# Patient Record
Sex: FEMALE | Race: BLACK OR AFRICAN AMERICAN | Employment: FULL TIME | URBAN - METROPOLITAN AREA
[De-identification: names, ages, dates, MRNs, and addresses within clinical notes are randomized per-mention and may not be internally consistent; named-entity substitution may affect disease eponyms.]

---

## 2017-01-16 ENCOUNTER — OFFICE VISIT (OUTPATIENT)
Dept: GASTROENTEROLOGY | Facility: CLINIC | Age: 37
End: 2017-01-16

## 2017-01-16 ENCOUNTER — TELEPHONE (OUTPATIENT)
Dept: GASTROENTEROLOGY | Facility: CLINIC | Age: 37
End: 2017-01-16

## 2017-01-16 VITALS
WEIGHT: 214.38 LBS | DIASTOLIC BLOOD PRESSURE: 78 MMHG | SYSTOLIC BLOOD PRESSURE: 116 MMHG | HEIGHT: 64.5 IN | HEART RATE: 93 BPM | BODY MASS INDEX: 36.15 KG/M2

## 2017-01-16 DIAGNOSIS — K59.01 SLOW TRANSIT CONSTIPATION: ICD-10-CM

## 2017-01-16 DIAGNOSIS — K92.1 BLOOD IN STOOL: Primary | ICD-10-CM

## 2017-01-16 PROCEDURE — 99242 OFF/OP CONSLTJ NEW/EST SF 20: CPT | Performed by: INTERNAL MEDICINE

## 2017-01-16 PROCEDURE — 99212 OFFICE O/P EST SF 10 MIN: CPT | Performed by: INTERNAL MEDICINE

## 2017-01-16 RX ORDER — NORGESTIMATE AND ETHINYL ESTRADIOL 7DAYSX3 28
KIT ORAL
COMMUNITY
End: 2017-09-06

## 2017-01-16 NOTE — PATIENT INSTRUCTIONS
1. Daily laxative regimen to make you more regular. Goal is 1-2 easy bowel movements per day. Options:    A. Stool softeners - Colace/docusate - 2-6 pills per day (all at once or 1-2 twice per day)    B.   Daily dose of fiber - helps some people and roel

## 2017-01-16 NOTE — TELEPHONE ENCOUNTER
Scheduled for:  Colonoscopy 32782  Date:  3/14/17  Location:  [facility]  Sedation:  Mac sedation  Time:  0730 Am  Prep: Miralax prep  Meds/Allergies Reconciled?:  Yes  Diagnosis with codes:   Rectal Bleeding K62.5  [facility] or [facility] notified?: [address]

## 2017-01-16 NOTE — PROGRESS NOTES
HPI:    Patient ID: Arcelia Love is a 39year old woman with history of 2 spontaneous childbirths, 2 children who is referred by Dr. Kaleb Gates for evaluation of rectal bleeding. Ms. Jenise Leavitt describes 4 episodes of small volume rectal bleeding.   This 5.6% February 20, 2016    Suggest:    1. I recommended colonoscopy examination with possible biopsy, possible polypectomy.  We discussed sedation options of conscious sedation versus MAC anesthesia, and agreed on MAC anesthesia due to young age, aversion t

## 2017-03-14 ENCOUNTER — ANESTHESIA (OUTPATIENT)
Dept: ENDOSCOPY | Facility: HOSPITAL | Age: 37
End: 2017-03-14
Payer: COMMERCIAL

## 2017-03-14 ENCOUNTER — HOSPITAL ENCOUNTER (OUTPATIENT)
Facility: HOSPITAL | Age: 37
Setting detail: HOSPITAL OUTPATIENT SURGERY
Discharge: HOME | End: 2017-03-14
Attending: INTERNAL MEDICINE | Admitting: INTERNAL MEDICINE
Payer: COMMERCIAL

## 2017-03-14 ENCOUNTER — SURGERY (OUTPATIENT)
Age: 37
End: 2017-03-14

## 2017-03-14 ENCOUNTER — ANESTHESIA EVENT (OUTPATIENT)
Dept: ENDOSCOPY | Facility: HOSPITAL | Age: 37
End: 2017-03-14
Payer: COMMERCIAL

## 2017-03-14 DIAGNOSIS — K62.5 RECTAL BLEEDING: Primary | ICD-10-CM

## 2017-03-14 DIAGNOSIS — K64.9 HEMORRHOIDS: ICD-10-CM

## 2017-03-14 DIAGNOSIS — K57.90 DIVERTICULOSIS: ICD-10-CM

## 2017-03-14 LAB — B-HCG UR QL: NEGATIVE

## 2017-03-14 PROCEDURE — 0DJD8ZZ INSPECTION OF LOWER INTESTINAL TRACT, VIA NATURAL OR ARTIFICIAL OPENING ENDOSCOPIC: ICD-10-PCS | Performed by: INTERNAL MEDICINE

## 2017-03-14 PROCEDURE — 45378 DIAGNOSTIC COLONOSCOPY: CPT | Performed by: INTERNAL MEDICINE

## 2017-03-14 RX ORDER — HALOPERIDOL 5 MG/ML
0.25 INJECTION INTRAMUSCULAR ONCE AS NEEDED
Status: DISCONTINUED | OUTPATIENT
Start: 2017-03-14 | End: 2017-03-14

## 2017-03-14 RX ORDER — HYDROMORPHONE HYDROCHLORIDE 1 MG/ML
0.2 INJECTION, SOLUTION INTRAMUSCULAR; INTRAVENOUS; SUBCUTANEOUS EVERY 5 MIN PRN
Status: DISCONTINUED | OUTPATIENT
Start: 2017-03-14 | End: 2017-03-14

## 2017-03-14 RX ORDER — NALOXONE HYDROCHLORIDE 0.4 MG/ML
80 INJECTION, SOLUTION INTRAMUSCULAR; INTRAVENOUS; SUBCUTANEOUS AS NEEDED
Status: DISCONTINUED | OUTPATIENT
Start: 2017-03-14 | End: 2017-03-14

## 2017-03-14 RX ORDER — SODIUM CHLORIDE, SODIUM LACTATE, POTASSIUM CHLORIDE, CALCIUM CHLORIDE 600; 310; 30; 20 MG/100ML; MG/100ML; MG/100ML; MG/100ML
INJECTION, SOLUTION INTRAVENOUS CONTINUOUS
Status: DISCONTINUED | OUTPATIENT
Start: 2017-03-14 | End: 2017-03-14

## 2017-03-14 RX ORDER — MORPHINE SULFATE 2 MG/ML
2 INJECTION, SOLUTION INTRAMUSCULAR; INTRAVENOUS EVERY 10 MIN PRN
Status: DISCONTINUED | OUTPATIENT
Start: 2017-03-14 | End: 2017-03-14

## 2017-03-14 RX ORDER — MORPHINE SULFATE 4 MG/ML
4 INJECTION, SOLUTION INTRAMUSCULAR; INTRAVENOUS EVERY 10 MIN PRN
Status: DISCONTINUED | OUTPATIENT
Start: 2017-03-14 | End: 2017-03-14

## 2017-03-14 RX ORDER — MORPHINE SULFATE 10 MG/ML
6 INJECTION, SOLUTION INTRAMUSCULAR; INTRAVENOUS EVERY 10 MIN PRN
Status: DISCONTINUED | OUTPATIENT
Start: 2017-03-14 | End: 2017-03-14

## 2017-03-14 RX ORDER — HYDROCODONE BITARTRATE AND ACETAMINOPHEN 5; 325 MG/1; MG/1
1 TABLET ORAL AS NEEDED
Status: DISCONTINUED | OUTPATIENT
Start: 2017-03-14 | End: 2017-03-14

## 2017-03-14 RX ORDER — MIDAZOLAM HYDROCHLORIDE 1 MG/ML
INJECTION INTRAMUSCULAR; INTRAVENOUS AS NEEDED
Status: DISCONTINUED | OUTPATIENT
Start: 2017-03-14 | End: 2017-03-14 | Stop reason: SURG

## 2017-03-14 RX ORDER — LIDOCAINE HYDROCHLORIDE 10 MG/ML
INJECTION, SOLUTION EPIDURAL; INFILTRATION; INTRACAUDAL; PERINEURAL AS NEEDED
Status: DISCONTINUED | OUTPATIENT
Start: 2017-03-14 | End: 2017-03-14 | Stop reason: SURG

## 2017-03-14 RX ORDER — HYDROMORPHONE HYDROCHLORIDE 1 MG/ML
0.6 INJECTION, SOLUTION INTRAMUSCULAR; INTRAVENOUS; SUBCUTANEOUS EVERY 5 MIN PRN
Status: DISCONTINUED | OUTPATIENT
Start: 2017-03-14 | End: 2017-03-14

## 2017-03-14 RX ORDER — ONDANSETRON 2 MG/ML
4 INJECTION INTRAMUSCULAR; INTRAVENOUS ONCE AS NEEDED
Status: DISCONTINUED | OUTPATIENT
Start: 2017-03-14 | End: 2017-03-14

## 2017-03-14 RX ORDER — HYDROCODONE BITARTRATE AND ACETAMINOPHEN 5; 325 MG/1; MG/1
2 TABLET ORAL AS NEEDED
Status: DISCONTINUED | OUTPATIENT
Start: 2017-03-14 | End: 2017-03-14

## 2017-03-14 RX ORDER — HYDROMORPHONE HYDROCHLORIDE 1 MG/ML
0.4 INJECTION, SOLUTION INTRAMUSCULAR; INTRAVENOUS; SUBCUTANEOUS EVERY 5 MIN PRN
Status: DISCONTINUED | OUTPATIENT
Start: 2017-03-14 | End: 2017-03-14

## 2017-03-14 RX ORDER — SODIUM CHLORIDE, SODIUM LACTATE, POTASSIUM CHLORIDE, CALCIUM CHLORIDE 600; 310; 30; 20 MG/100ML; MG/100ML; MG/100ML; MG/100ML
INJECTION, SOLUTION INTRAVENOUS CONTINUOUS PRN
Status: DISCONTINUED | OUTPATIENT
Start: 2017-03-14 | End: 2017-03-14 | Stop reason: SURG

## 2017-03-14 RX ADMIN — SODIUM CHLORIDE, SODIUM LACTATE, POTASSIUM CHLORIDE, CALCIUM CHLORIDE: 600; 310; 30; 20 INJECTION, SOLUTION INTRAVENOUS at 07:20:00

## 2017-03-14 RX ADMIN — SODIUM CHLORIDE, SODIUM LACTATE, POTASSIUM CHLORIDE, CALCIUM CHLORIDE: 600; 310; 30; 20 INJECTION, SOLUTION INTRAVENOUS at 08:13:00

## 2017-03-14 RX ADMIN — MIDAZOLAM HYDROCHLORIDE 2 MG: 1 INJECTION INTRAMUSCULAR; INTRAVENOUS at 07:48:00

## 2017-03-14 RX ADMIN — LIDOCAINE HYDROCHLORIDE 50 MG: 10 INJECTION, SOLUTION EPIDURAL; INFILTRATION; INTRACAUDAL; PERINEURAL at 07:48:00

## 2017-03-14 NOTE — ANESTHESIA POSTPROCEDURE EVALUATION
Patient: Abhay Ferrari    Procedure Summary     Date Anesthesia Start Anesthesia Stop Room / Location    03/14/17 0737 0820 [facility] ENDOSCOPY 05 / [facility] ENDOSCOPY       Procedure Diagnosis Surgeon Responsible Provider    COLONOSCOPY (N/A ) Rectal bleeding

## 2017-03-14 NOTE — OPERATIVE REPORT
COLONOSCOPY PROCEDURE REPORT     DATE OF PROCEDURE:  3/14/2017     PCP: Jeremiah Anton MD     PREOPERATIVE DIAGNOSIS:  Rectal bleeding     POSTOPERATIVE DIAGNOSIS:  See impression. SURGEON:  FLASH Guzman Friday anesthesia

## 2017-03-14 NOTE — ANESTHESIA PREPROCEDURE EVALUATION
Anesthesia PreOp Note    HPI:     Jaye Shields is a 39year old female who presents for preoperative consultation requested by: Dave Rdz MD    Date of Surgery: 3/14/2017    Procedure(s):  COLONOSCOPY  Indication: Rectal bleeding Narrative       Available pre-op labs reviewed. Lab Results  Component Value Date   URINEPREG Negative 03/14/2017             Vital Signs: Body mass index is 35.86 kg/(m^2). height is 1.626 m (5' 4\") and weight is 94.802 kg (209 lb).     03/13/17  11

## 2017-03-14 NOTE — H&P
History & Physical Examination    Patient Name: Yossi Castellanos  MRN: [id number]  CSN: [id number]  YOB: 1980    Diagnosis: Rectal bleeding    Present Illness:     Signed Mon Jan 16, 2017 9:35 AM CST 1/16/2017     HPI:    Patient ID: Chinmay Edwards HYDROmorphone HCl PF (DILAUDID) 1 MG/ML injection 0.6 mg 0.6 mg Intravenous Q5 Min PRN   morphINE sulfate (PF) 2 MG/ML injection 2 mg 2 mg Intravenous Q10 Min PRN   morphINE sulfate (PF) 4 MG/ML injection 4 mg 4 mg Intravenous Q10 Min PRN   morphINE sulf Aravind.Boros ]    Elli Jacobs [ ] [ ]    HEART [ X ] [ X ]    LUNGS [ X ] [ X ]    Gerhard Medina [ X ] [ X ]    Janis Xavier [ ] [ ]    EXTREMITIES [ ] [ ]    OTHER        See Anesthesia documentation  [ x ] I have discussed the risks and benefits and alternatives with the

## 2017-03-15 VITALS
WEIGHT: 209 LBS | HEIGHT: 64 IN | BODY MASS INDEX: 35.68 KG/M2 | OXYGEN SATURATION: 100 % | HEART RATE: 93 BPM | RESPIRATION RATE: 13 BRPM | SYSTOLIC BLOOD PRESSURE: 125 MMHG | DIASTOLIC BLOOD PRESSURE: 79 MMHG

## 2017-04-03 ENCOUNTER — OFFICE VISIT (OUTPATIENT)
Dept: INTERNAL MEDICINE CLINIC | Facility: CLINIC | Age: 37
End: 2017-04-03

## 2017-04-03 ENCOUNTER — TELEPHONE (OUTPATIENT)
Dept: OBGYN CLINIC | Facility: CLINIC | Age: 37
End: 2017-04-03

## 2017-04-03 VITALS
TEMPERATURE: 98 F | RESPIRATION RATE: 18 BRPM | SYSTOLIC BLOOD PRESSURE: 120 MMHG | HEART RATE: 96 BPM | BODY MASS INDEX: 35.48 KG/M2 | DIASTOLIC BLOOD PRESSURE: 76 MMHG | WEIGHT: 210.38 LBS | HEIGHT: 64.5 IN

## 2017-04-03 DIAGNOSIS — E66.9 NON MORBID OBESITY, UNSPECIFIED OBESITY TYPE: ICD-10-CM

## 2017-04-03 DIAGNOSIS — F43.9 STRESS AT HOME: ICD-10-CM

## 2017-04-03 DIAGNOSIS — Z00.00 ROUTINE PHYSICAL EXAMINATION: Primary | ICD-10-CM

## 2017-04-03 PROCEDURE — 99395 PREV VISIT EST AGE 18-39: CPT | Performed by: INTERNAL MEDICINE

## 2017-04-03 NOTE — TELEPHONE ENCOUNTER
One option is to finish new pack so that she will have period after 6 weeks of active pills instead of 3 weeks. She may or may not have BTB this month.   Other option if she only took 1 active pill is to stop and have period like she is supposed to and [facility]

## 2017-04-03 NOTE — PROGRESS NOTES
HPI:    Patient ID: Maria R Horowitz is a 39year old female. HPI  Patient is here for general physical exam.  I last saw her in February of last year. Since that visit she saw the gynecology department for regular checkup.   Also saw different intern Standard drinks or equivalent per week       Comment: Occasionally         Review of Systems   Constitutional: Negative for fever, chills and fatigue. HENT: Negative for hearing loss. Eyes: Negative for visual disturbance.    Respiratory: Negative for mood and affect. Her behavior is normal. Judgment and thought content normal.       Body mass index is 35.57 kg/(m^2). ASSESSMENT/PLAN:   (Z00.00) Routine physical examination  (primary encounter diagnosis)  Plan: Physical exam is unremarkable.   Act

## 2017-04-03 NOTE — TELEPHONE ENCOUNTER
PT THOUGHT SHE WAS TO START A NEW PACK OF OC'S TODAY. TOOK FIRST WHITE PILL. THEN SHE REALIZED SHE WAS SUPPOSED TO BE TAKING PLACEBO PILLS THIS WEEK. TOLD PT SHE MAY NOT GET A BLEED THIS WEEK LIKE SHE NORMALLY WOULD OR BLEED COULD BE LATER THAN USUAL.

## 2017-07-07 NOTE — TELEPHONE ENCOUNTER
Pt has an appt with SELVIN for 9/6/17. Pt requesting rx for Trinessa 28, brand name. (Rx sent 11/8/16 for 3 packages and 2 refills.)  Pt states that she is taking [place] every month and gets a period every month.   Can rx be sent for Trinessa until appt wi

## 2017-07-10 RX ORDER — NORGESTIMATE-ETHINYL ESTRADIOL 7DAYSX3 28
1 TABLET ORAL DAILY
Qty: 1 PACKAGE | Refills: 2 | Status: SHIPPED | OUTPATIENT
Start: 2017-07-10 | End: 2017-08-07

## 2017-07-11 RX ORDER — NORGESTIMATE-ETHINYL ESTRADIOL 7DAYSX3 28
1 TABLET ORAL DAILY
Qty: 84 TABLET | Refills: 2 | OUTPATIENT
Start: 2017-07-11 | End: 2017-08-08

## 2017-09-06 ENCOUNTER — OFFICE VISIT (OUTPATIENT)
Dept: OBGYN CLINIC | Facility: CLINIC | Age: 37
End: 2017-09-06

## 2017-09-06 VITALS
HEART RATE: 96 BPM | WEIGHT: 218.81 LBS | HEIGHT: 64.25 IN | BODY MASS INDEX: 37.36 KG/M2 | SYSTOLIC BLOOD PRESSURE: 126 MMHG | DIASTOLIC BLOOD PRESSURE: 81 MMHG

## 2017-09-06 DIAGNOSIS — Z12.4 SCREENING FOR MALIGNANT NEOPLASM OF CERVIX: ICD-10-CM

## 2017-09-06 DIAGNOSIS — Z01.419 ENCOUNTER FOR GYNECOLOGICAL EXAMINATION: Primary | ICD-10-CM

## 2017-09-06 PROCEDURE — 99395 PREV VISIT EST AGE 18-39: CPT | Performed by: OBSTETRICS & GYNECOLOGY

## 2017-09-06 RX ORDER — NORGESTIMATE AND ETHINYL ESTRADIOL 7DAYSX3 28
1 KIT ORAL DAILY
Qty: 3 PACKAGE | Refills: 3 | Status: SHIPPED | OUTPATIENT
Start: 2017-09-06 | End: 2018-07-25

## 2017-09-07 LAB
HPV I/H RISK 1 DNA SPEC QL NAA+PROBE: NEGATIVE
LAST PAP RESULT: NORMAL

## 2017-09-07 NOTE — PROGRESS NOTES
Perla Coughlin is a 39year old female [id number] Patient's last menstrual period was 08/24/2017 (exact date). here for annual exam.       Last seen 7/22/15. Last pap 7/2014 normal with neg HPV. Doing well with [employer] and [place]. Declined STD screen.     Had Known Allergies      Review of Systems:  Constitutional:  Denies fatigue, night sweats, hot flashes  Eyes:  denies blurred or double vision  Cardiovascular:  denies chest pain or palpitations  Respiratory:  denies shortness of breath  Gastrointestinal:  de tenderness  Perineum/anus: normal      Assessment & Plan:    Geno Guan is a 39year old female who presents for an annual physical exam.    1. Encounter for gynecological examination  Pap and HPV. Will do Pap/HPV q 3 years.    Annual exams encour

## 2017-11-14 ENCOUNTER — OFFICE VISIT (OUTPATIENT)
Dept: INTERNAL MEDICINE CLINIC | Facility: CLINIC | Age: 37
End: 2017-11-14

## 2017-11-14 VITALS
HEART RATE: 90 BPM | TEMPERATURE: 98 F | BODY MASS INDEX: 37.07 KG/M2 | DIASTOLIC BLOOD PRESSURE: 75 MMHG | HEIGHT: 64.5 IN | SYSTOLIC BLOOD PRESSURE: 114 MMHG | WEIGHT: 219.81 LBS

## 2017-11-14 DIAGNOSIS — M72.2 PLANTAR FASCIITIS: Primary | ICD-10-CM

## 2017-11-14 DIAGNOSIS — L81.1 MELASMA: ICD-10-CM

## 2017-11-14 PROCEDURE — 99213 OFFICE O/P EST LOW 20 MIN: CPT | Performed by: INTERNAL MEDICINE

## 2017-11-14 PROCEDURE — 99212 OFFICE O/P EST SF 10 MIN: CPT | Performed by: INTERNAL MEDICINE

## 2017-11-14 RX ORDER — NAPROXEN 500 MG/1
500 TABLET ORAL 2 TIMES DAILY WITH MEALS
Qty: 30 TABLET | Refills: 0 | Status: SHIPPED | OUTPATIENT
Start: 2017-11-14 | End: 2017-12-03

## 2017-11-14 NOTE — PROGRESS NOTES
Monique Castillo is a 40year old female. Patient presents with: Foot Pain: left foot  Derm Problem: dark spots on neck    HPI:   About 2 months ago, shortly after she was doing a lot of walking, she developed pain on the plantar aspects of both heels. 4.5\" (1.638 m)   Wt 219 lb 12.8 oz (99.7 kg)   BMI 37.15 kg/m²   NECK: Patchy areas of brownish macular hyperpigmentation bilateral right greater than left neck  EXTREMITIES: Mild-moderate tenderness plantar aspect left calcaneus without palpable or visib

## 2017-11-14 NOTE — PATIENT INSTRUCTIONS
Please do stretching exercises regularly and apply ice to your left heel 2-3 times daily. Please take naproxen 500 mg twice daily with food. Call if no better.

## 2017-11-15 ENCOUNTER — TELEPHONE (OUTPATIENT)
Dept: INTERNAL MEDICINE CLINIC | Facility: CLINIC | Age: 37
End: 2017-11-15

## 2017-11-15 NOTE — TELEPHONE ENCOUNTER
Patient is asking for a note for work stating that she needs to wear more comfortable shoes  ( meaning not a dress shoe )  More casual shoe, even maybe a gym shoe. Please send not through My Chart.

## 2017-12-03 RX ORDER — NAPROXEN 500 MG/1
500 TABLET ORAL 2 TIMES DAILY WITH MEALS
Qty: 30 TABLET | Refills: 0 | Status: SHIPPED | OUTPATIENT
Start: 2017-12-03 | End: 2018-09-11

## 2018-07-25 RX ORDER — NORGESTIMATE AND ETHINYL ESTRADIOL 7DAYSX3 28
1 KIT ORAL DAILY
Qty: 1 PACKAGE | Refills: 0 | Status: SHIPPED
Start: 2018-07-25 | End: 2018-09-11

## 2018-07-25 NOTE — TELEPHONE ENCOUNTER
From: Javy Shelton  Sent: 7/25/2018 9:38 AM CDT  Subject: Medication Renewal Request    Alejo Henderson would like a refill of the following medications:     Norgestim-Eth Estrad Triphasic (TRINESSA, 28,) 0.18/0.215/0.25 MG-35 MCG Oral Tab Chas Lazaro

## 2018-07-25 NOTE — TELEPHONE ENCOUNTER
Informed pt that she should have refills available SELVIN sent Rx 9/2017 for 1 full year. Pt states she was not sure if she did. Informed pt that she is due for her annual exam in 9/2018. Assisted pt with scheduling annual exam with SELVIN on 9/11/18.  Informed p

## 2018-09-11 ENCOUNTER — OFFICE VISIT (OUTPATIENT)
Dept: INTERNAL MEDICINE CLINIC | Facility: CLINIC | Age: 38
End: 2018-09-11
Payer: COMMERCIAL

## 2018-09-11 ENCOUNTER — LAB ENCOUNTER (OUTPATIENT)
Dept: LAB | Facility: HOSPITAL | Age: 38
End: 2018-09-11
Attending: PHYSICIAN ASSISTANT
Payer: COMMERCIAL

## 2018-09-11 VITALS
HEIGHT: 64 IN | DIASTOLIC BLOOD PRESSURE: 84 MMHG | SYSTOLIC BLOOD PRESSURE: 124 MMHG | WEIGHT: 216.31 LBS | BODY MASS INDEX: 36.93 KG/M2 | HEART RATE: 89 BPM

## 2018-09-11 DIAGNOSIS — Z00.00 ROUTINE PHYSICAL EXAMINATION: ICD-10-CM

## 2018-09-11 DIAGNOSIS — Z80.3 FAMILY HISTORY OF BREAST CANCER IN MOTHER: ICD-10-CM

## 2018-09-11 DIAGNOSIS — M72.2 PLANTAR FASCIITIS, LEFT: ICD-10-CM

## 2018-09-11 DIAGNOSIS — Z91.89 AT HIGH RISK FOR BREAST CANCER: ICD-10-CM

## 2018-09-11 DIAGNOSIS — Z00.00 ROUTINE PHYSICAL EXAMINATION: Primary | ICD-10-CM

## 2018-09-11 DIAGNOSIS — Z01.419 ENCOUNTER FOR GYNECOLOGICAL EXAMINATION WITHOUT ABNORMAL FINDING: ICD-10-CM

## 2018-09-11 LAB
ALBUMIN SERPL BCP-MCNC: 3.5 G/DL (ref 3.5–4.8)
ALBUMIN/GLOB SERPL: 0.9 {RATIO} (ref 1–2)
ALP SERPL-CCNC: 85 U/L (ref 32–100)
ALT SERPL-CCNC: 10 U/L (ref 14–54)
ANION GAP SERPL CALC-SCNC: 7 MMOL/L (ref 0–18)
AST SERPL-CCNC: 18 U/L (ref 15–41)
BASOPHILS # BLD: 0 K/UL (ref 0–0.2)
BASOPHILS NFR BLD: 1 %
BILIRUB SERPL-MCNC: 0.2 MG/DL (ref 0.3–1.2)
BUN SERPL-MCNC: 12 MG/DL (ref 8–20)
BUN/CREAT SERPL: 14.1 (ref 10–20)
CALCIUM SERPL-MCNC: 8.9 MG/DL (ref 8.5–10.5)
CHLORIDE SERPL-SCNC: 103 MMOL/L (ref 95–110)
CHOLEST SERPL-MCNC: 163 MG/DL (ref 110–200)
CO2 SERPL-SCNC: 26 MMOL/L (ref 22–32)
CREAT SERPL-MCNC: 0.85 MG/DL (ref 0.5–1.5)
EOSINOPHIL # BLD: 0 K/UL (ref 0–0.7)
EOSINOPHIL NFR BLD: 1 %
ERYTHROCYTE [DISTWIDTH] IN BLOOD BY AUTOMATED COUNT: 15.2 % (ref 11–15)
GLOBULIN PLAS-MCNC: 3.9 G/DL (ref 2.5–3.7)
GLUCOSE SERPL-MCNC: 90 MG/DL (ref 70–99)
HBA1C MFR BLD: 5.6 % (ref 4–6)
HCT VFR BLD AUTO: 36.2 % (ref 35–48)
HDLC SERPL-MCNC: 54 MG/DL
HGB BLD-MCNC: 11.8 G/DL (ref 12–16)
LDLC SERPL CALC-MCNC: 83 MG/DL (ref 0–99)
LYMPHOCYTES # BLD: 2.2 K/UL (ref 1–4)
LYMPHOCYTES NFR BLD: 34 %
MCH RBC QN AUTO: 27.3 PG (ref 27–32)
MCHC RBC AUTO-ENTMCNC: 32.5 G/DL (ref 32–37)
MCV RBC AUTO: 84.2 FL (ref 80–100)
MONOCYTES # BLD: 0.6 K/UL (ref 0–1)
MONOCYTES NFR BLD: 9 %
NEUTROPHILS # BLD AUTO: 3.6 K/UL (ref 1.8–7.7)
NEUTROPHILS NFR BLD: 56 %
NONHDLC SERPL-MCNC: 109 MG/DL
OSMOLALITY UR CALC.SUM OF ELEC: 281 MOSM/KG (ref 275–295)
PATIENT FASTING: YES
PLATELET # BLD AUTO: 389 K/UL (ref 140–400)
PMV BLD AUTO: 10.3 FL (ref 7.4–10.3)
POTASSIUM SERPL-SCNC: 3.7 MMOL/L (ref 3.3–5.1)
PROT SERPL-MCNC: 7.4 G/DL (ref 5.9–8.4)
RBC # BLD AUTO: 4.3 M/UL (ref 3.7–5.4)
SODIUM SERPL-SCNC: 136 MMOL/L (ref 136–144)
TRIGL SERPL-MCNC: 128 MG/DL (ref 1–149)
TSH SERPL-ACNC: 1.57 UIU/ML (ref 0.45–5.33)
WBC # BLD AUTO: 6.5 K/UL (ref 4–11)

## 2018-09-11 PROCEDURE — 36415 COLL VENOUS BLD VENIPUNCTURE: CPT

## 2018-09-11 PROCEDURE — 83036 HEMOGLOBIN GLYCOSYLATED A1C: CPT

## 2018-09-11 PROCEDURE — 85025 COMPLETE CBC W/AUTO DIFF WBC: CPT

## 2018-09-11 PROCEDURE — 84443 ASSAY THYROID STIM HORMONE: CPT

## 2018-09-11 PROCEDURE — 80053 COMPREHEN METABOLIC PANEL: CPT

## 2018-09-11 PROCEDURE — 80061 LIPID PANEL: CPT

## 2018-09-11 PROCEDURE — 99395 PREV VISIT EST AGE 18-39: CPT | Performed by: PHYSICIAN ASSISTANT

## 2018-09-11 RX ORDER — NORGESTIMATE AND ETHINYL ESTRADIOL 7DAYSX3 28
1 KIT ORAL DAILY
Qty: 1 PACKAGE | Refills: 11 | Status: SHIPPED | OUTPATIENT
Start: 2018-09-11 | End: 2019-08-09

## 2018-09-11 NOTE — PROGRESS NOTES
HPI:    Patient ID: Aide Dallas is a 40year old female. HPI   Patient presents today requesting physical exam.  She was last seen in the office by PCP on 4/3/17. At that time was referred to [facility] for ongoing stress at home.   Was seen by joint pain and gait problem. Skin: Negative for rash. Neurological: Negative for weakness, light-headedness and headaches. Hematological: Negative for adenopathy. Psychiatric/Behavioral: Negative for sleep disturbance and depressed mood.  The patien tenderness (left heel). Lymphadenopathy:     She has no cervical adenopathy. Neurological: She is alert and oriented to person, place, and time. No cranial nerve deficit. Skin: Skin is warm and dry. Psychiatric: She has a normal mood and affect.  He Visit:  Requested Prescriptions     Signed Prescriptions Disp Refills   • Norgestim-Eth Estrad Triphasic (TRINESSA, 28,) 0.18/0.215/0.25 MG-35 MCG Oral Tab 1 Package 11     Sig: Take 1 tablet by mouth daily.        Imaging & Referrals:  PODIATRY - INTERNAL

## 2018-10-20 ENCOUNTER — HOSPITAL ENCOUNTER (OUTPATIENT)
Dept: MAMMOGRAPHY | Facility: HOSPITAL | Age: 38
Discharge: HOME | End: 2018-10-20
Attending: PHYSICIAN ASSISTANT
Payer: COMMERCIAL

## 2018-10-20 DIAGNOSIS — Z80.3 FAMILY HISTORY OF BREAST CANCER IN MOTHER: ICD-10-CM

## 2018-10-20 DIAGNOSIS — Z91.89 AT HIGH RISK FOR BREAST CANCER: ICD-10-CM

## 2018-10-20 PROCEDURE — 77067 SCR MAMMO BI INCL CAD: CPT | Performed by: PHYSICIAN ASSISTANT

## 2018-10-20 PROCEDURE — 77063 BREAST TOMOSYNTHESIS BI: CPT | Performed by: PHYSICIAN ASSISTANT

## 2018-10-31 ENCOUNTER — APPOINTMENT (OUTPATIENT)
Dept: GENETICS | Facility: HOSPITAL | Age: 38
End: 2018-10-31
Attending: GENETIC COUNSELOR, MS
Payer: COMMERCIAL

## 2018-11-14 ENCOUNTER — APPOINTMENT (OUTPATIENT)
Dept: GENETICS | Facility: HOSPITAL | Age: 38
End: 2018-11-14
Attending: GENETIC COUNSELOR, MS
Payer: COMMERCIAL

## 2018-12-06 ENCOUNTER — TELEPHONE (OUTPATIENT)
Dept: HEMATOLOGY/ONCOLOGY | Facility: HOSPITAL | Age: 38
End: 2018-12-06

## 2018-12-06 NOTE — TELEPHONE ENCOUNTER
Made final attempt to reschedule patient, if she calls back restart the intake process, Hernandez Hollins folder discarded

## 2019-08-06 ENCOUNTER — TELEPHONE (OUTPATIENT)
Dept: INTERNAL MEDICINE CLINIC | Facility: CLINIC | Age: 39
End: 2019-08-06

## 2019-08-06 NOTE — TELEPHONE ENCOUNTER
PATIENT DROPPED OFF HEALTH SCREENING FORM . PLEASE CALL HER WHEN FORM IS COMPLETED AND SHE WILL COME TO .

## 2019-08-09 RX ORDER — NORGESTIMATE AND ETHINYL ESTRADIOL 7DAYSX3 28
KIT ORAL
Qty: 84 TABLET | Refills: 1 | Status: SHIPPED | OUTPATIENT
Start: 2019-08-09 | End: 2019-12-20

## 2019-08-09 NOTE — TELEPHONE ENCOUNTER
Please review; protocol failed. Last rx by Jenn Vargas    Recent Visits  Date Type Provider Dept   09/11/18 Office Visit Issac Huang PA-C [facility]-Internal Med   Showing recent visits within past 540 days with a meds authorizing provider and meeting all other requirements     Future Appointments  No visits were found meeting these conditions.    Showing future appointments within next 150 days with a meds authorizing provider and meeting all other requirements

## 2019-08-09 NOTE — TELEPHONE ENCOUNTER
LEFT MESSAGE FOR PATIENT ON HOME VOICEMAIL INFORMING FORM AVAILABLE FOR  AT [facility] OF THE [facility].

## 2019-10-12 ENCOUNTER — OFFICE VISIT (OUTPATIENT)
Dept: INTERNAL MEDICINE CLINIC | Facility: CLINIC | Age: 39
End: 2019-10-12
Payer: COMMERCIAL

## 2019-10-12 VITALS
BODY MASS INDEX: 39.37 KG/M2 | SYSTOLIC BLOOD PRESSURE: 138 MMHG | HEART RATE: 102 BPM | TEMPERATURE: 98 F | WEIGHT: 230.63 LBS | RESPIRATION RATE: 18 BRPM | HEIGHT: 64 IN | DIASTOLIC BLOOD PRESSURE: 82 MMHG

## 2019-10-12 DIAGNOSIS — Z00.00 ROUTINE PHYSICAL EXAMINATION: Primary | ICD-10-CM

## 2019-10-12 DIAGNOSIS — M25.561 RIGHT KNEE PAIN, UNSPECIFIED CHRONICITY: ICD-10-CM

## 2019-10-12 PROCEDURE — 99395 PREV VISIT EST AGE 18-39: CPT | Performed by: INTERNAL MEDICINE

## 2019-10-12 NOTE — PROGRESS NOTES
HPI:    Patient ID: Perla Coughlin is a 44year old female. HPI  Patient is here requesting a physical exam.  Seen here a year ago by the [de-identified] assistant. She is to be seeing the gynecologist soon.   The complaint today is that of right knee p Review of Systems   Constitutional: Negative for chills, fatigue and fever. HENT: Negative for hearing loss. Eyes: Negative for visual disturbance. Respiratory: Negative for cough and shortness of breath.     Cardiovascular: Negative for chest swelling, warmth, crepitus, or decreased range of motion. Lymphadenopathy:     She has no cervical adenopathy. Neurological: She is alert. No cranial nerve deficit. Coordination normal.   Skin: Skin is warm and dry. No rash noted.    Psychiatric: She ha

## 2019-10-29 ENCOUNTER — NURSE TRIAGE (OUTPATIENT)
Dept: OTHER | Age: 39
End: 2019-10-29

## 2019-10-29 ENCOUNTER — PATIENT MESSAGE (OUTPATIENT)
Dept: INTERNAL MEDICINE CLINIC | Facility: CLINIC | Age: 39
End: 2019-10-29

## 2019-10-29 NOTE — TELEPHONE ENCOUNTER
----- Message from Jerome Henderson sent at 10/29/2019  8:11 AM CDT -----  Regarding: Visit Follow-up Question  Contact: [phone]  Hi Dr. Florina Peters,    I have had a dry cough for a month now. Just recently like a week ago a stuffy nose as well.  No ramakrishna

## 2019-10-29 NOTE — TELEPHONE ENCOUNTER
Spoke with patient and relayed Dr. Lindsay Menchaca message below--patient verbalizes understanding and agreement. No further questions/concerns at this time.

## 2019-12-20 ENCOUNTER — OFFICE VISIT (OUTPATIENT)
Dept: OBGYN CLINIC | Facility: CLINIC | Age: 39
End: 2019-12-20
Payer: COMMERCIAL

## 2019-12-20 VITALS
HEART RATE: 99 BPM | DIASTOLIC BLOOD PRESSURE: 88 MMHG | BODY MASS INDEX: 39.67 KG/M2 | HEIGHT: 64 IN | WEIGHT: 232.38 LBS | SYSTOLIC BLOOD PRESSURE: 134 MMHG

## 2019-12-20 DIAGNOSIS — Z01.419 ENCOUNTER FOR GYNECOLOGICAL EXAMINATION: Primary | ICD-10-CM

## 2019-12-20 DIAGNOSIS — Z11.3 SCREEN FOR STD (SEXUALLY TRANSMITTED DISEASE): ICD-10-CM

## 2019-12-20 DIAGNOSIS — Z12.31 ENCOUNTER FOR SCREENING MAMMOGRAM FOR BREAST CANCER: ICD-10-CM

## 2019-12-20 PROCEDURE — 99395 PREV VISIT EST AGE 18-39: CPT | Performed by: OBSTETRICS & GYNECOLOGY

## 2019-12-20 RX ORDER — NORGESTIMATE AND ETHINYL ESTRADIOL 7DAYSX3 28
1 KIT ORAL
Qty: 84 TABLET | Refills: 1 | Status: SHIPPED | OUTPATIENT
Start: 2019-12-20 | End: 2020-07-02

## 2019-12-22 NOTE — PROGRESS NOTES
Monique Castillo is a 44year old female [id number] Patient's last menstrual period was 12/12/2019. here for annual exam.       Last seen 9/6/17. Last pap 9/2017 normal with neg HPV. Doing well with generic Trinessa.   In last 3 months, having BTB in b Norgestim-Eth Estrad Triphasic (TRI FEMYNOR) 0.18/0.215/0.25 MG-35 MCG Oral Tab Take 1 tablet by mouth once daily.  84 tablet 1       ALLERGIES:  No Known Allergies      Review of Systems:  Constitutional:  Denies fatigue, night sweats, hot flashes  Eyes: tenderness  Perineum/anus: normal      Assessment & Plan:    Shravan Bermudez is a 44year old female who presents for an annual physical exam.    1. Encounter for gynecological examination  Pap not done. ASCCP guidelines reviewed.    Encouraged annual e

## 2020-02-08 ENCOUNTER — HOSPITAL ENCOUNTER (OUTPATIENT)
Dept: MAMMOGRAPHY | Facility: HOSPITAL | Age: 40
Discharge: HOME | End: 2020-02-08
Attending: OBSTETRICS & GYNECOLOGY
Payer: COMMERCIAL

## 2020-02-08 DIAGNOSIS — Z12.31 ENCOUNTER FOR SCREENING MAMMOGRAM FOR BREAST CANCER: ICD-10-CM

## 2020-02-08 PROCEDURE — 77063 BREAST TOMOSYNTHESIS BI: CPT | Performed by: OBSTETRICS & GYNECOLOGY

## 2020-02-08 PROCEDURE — 77067 SCR MAMMO BI INCL CAD: CPT | Performed by: OBSTETRICS & GYNECOLOGY

## 2020-02-11 ENCOUNTER — TELEPHONE (OUTPATIENT)
Dept: OBGYN CLINIC | Facility: CLINIC | Age: 40
End: 2020-02-11

## 2020-02-11 NOTE — TELEPHONE ENCOUNTER
Pt states she received a letter from [address] stating she needed to call Yamilet Graff [address] office to discuss results from 2/8/2020. Informed pt she needs f/u right breast u/s. Pt asking to schedule appt. Provided pt with phone number for mammo so she can schedule appt.  P

## 2020-02-19 ENCOUNTER — HOSPITAL ENCOUNTER (OUTPATIENT)
Dept: ULTRASOUND IMAGING | Facility: HOSPITAL | Age: 40
Discharge: HOME | End: 2020-02-19
Attending: OBSTETRICS & GYNECOLOGY
Payer: COMMERCIAL

## 2020-02-19 DIAGNOSIS — R92.8 ABNORMAL MAMMOGRAM: ICD-10-CM

## 2020-02-19 PROCEDURE — 76642 ULTRASOUND BREAST LIMITED: CPT | Performed by: OBSTETRICS & GYNECOLOGY

## 2020-06-30 RX ORDER — NORGESTIMATE AND ETHINYL ESTRADIOL 7DAYSX3 28
1 KIT ORAL
Qty: 84 TABLET | Refills: 1 | Status: CANCELLED | OUTPATIENT
Start: 2020-06-30

## 2020-06-30 NOTE — TELEPHONE ENCOUNTER
IF PT CALLS BACK SHE NEEDS BP CHECK. CAN BOOK ON RN SCHEDULE SINCE JLK IS GONE FOR A WEEK. WE CAN RUN BP CHECK WITH A DR IN THE OFFICE.

## 2020-07-02 ENCOUNTER — TELEPHONE (OUTPATIENT)
Dept: OBGYN CLINIC | Facility: CLINIC | Age: 40
End: 2020-07-02

## 2020-07-02 ENCOUNTER — NURSE ONLY (OUTPATIENT)
Dept: OBGYN CLINIC | Facility: CLINIC | Age: 40
End: 2020-07-02
Payer: COMMERCIAL

## 2020-07-02 VITALS
WEIGHT: 236 LBS | SYSTOLIC BLOOD PRESSURE: 134 MMHG | DIASTOLIC BLOOD PRESSURE: 84 MMHG | BODY MASS INDEX: 41 KG/M2 | HEART RATE: 98 BPM

## 2020-07-02 DIAGNOSIS — Z01.30 BP CHECK: Primary | ICD-10-CM

## 2020-07-02 PROCEDURE — 99211 OFF/OP EST MAY X REQ PHY/QHP: CPT | Performed by: OBSTETRICS & GYNECOLOGY

## 2020-07-02 RX ORDER — NORGESTIMATE AND ETHINYL ESTRADIOL 7DAYSX3 28
1 KIT ORAL
Qty: 84 TABLET | Refills: 1 | Status: SHIPPED | OUTPATIENT
Start: 2020-07-02 | End: 2020-12-18

## 2020-07-02 NOTE — TELEPHONE ENCOUNTER
Pt was seen with MA for BP check today. Pt had annual exam with SELVIN on 12/20/19 and was prescribed Tri Femynor and was advised to RTC in 3-4 months for BP check. Due to COVID, pts appt was postponed until today. Pts BP in office at 134/84 per MA.  Refill of

## 2020-07-02 NOTE — PROGRESS NOTES
PATIENT HERE FOR BP CHECK AFTER BEING ON OCP, PATIENTS BP TODAY /84 PATIENTS REFILL REQUEST SENT BY NURSE TO DOCTOR SELVIN TO SIGN OFF ON.

## 2020-12-18 RX ORDER — NORGESTIMATE AND ETHINYL ESTRADIOL 7DAYSX3 28
KIT ORAL
Qty: 28 TABLET | Refills: 0 | Status: SHIPPED | OUTPATIENT
Start: 2020-12-18 | End: 2020-12-29

## 2020-12-18 NOTE — TELEPHONE ENCOUNTER
Last annual - 12/20/2019  Last pap - 9/6/2017, normal, neg hpv  Last mammo - 2/8/2020, incomplete, right breast US done on 2/19/2020-benign  Annual scheduled on 12/29/2020.   Pt was given #84 with 1 refill on 7/2/2020 after BP check to cover until annual.

## 2020-12-29 ENCOUNTER — OFFICE VISIT (OUTPATIENT)
Dept: OBGYN CLINIC | Facility: CLINIC | Age: 40
End: 2020-12-29
Payer: COMMERCIAL

## 2020-12-29 VITALS
BODY MASS INDEX: 40.61 KG/M2 | SYSTOLIC BLOOD PRESSURE: 122 MMHG | HEIGHT: 63.7 IN | HEART RATE: 103 BPM | DIASTOLIC BLOOD PRESSURE: 85 MMHG | WEIGHT: 235 LBS

## 2020-12-29 DIAGNOSIS — Z12.31 ENCOUNTER FOR SCREENING MAMMOGRAM FOR BREAST CANCER: ICD-10-CM

## 2020-12-29 DIAGNOSIS — Z01.419 ENCOUNTER FOR GYNECOLOGICAL EXAMINATION: Primary | ICD-10-CM

## 2020-12-29 DIAGNOSIS — N84.1 CERVICAL POLYP: ICD-10-CM

## 2020-12-29 PROCEDURE — 3079F DIAST BP 80-89 MM HG: CPT | Performed by: OBSTETRICS & GYNECOLOGY

## 2020-12-29 PROCEDURE — 3008F BODY MASS INDEX DOCD: CPT | Performed by: OBSTETRICS & GYNECOLOGY

## 2020-12-29 PROCEDURE — 3074F SYST BP LT 130 MM HG: CPT | Performed by: OBSTETRICS & GYNECOLOGY

## 2020-12-29 PROCEDURE — 99396 PREV VISIT EST AGE 40-64: CPT | Performed by: OBSTETRICS & GYNECOLOGY

## 2020-12-29 RX ORDER — NORGESTIMATE AND ETHINYL ESTRADIOL 7DAYSX3 28
1 KIT ORAL DAILY
Qty: 3 PACKAGE | Refills: 3 | Status: SHIPPED | OUTPATIENT
Start: 2020-12-29 | End: 2021-09-13

## 2020-12-29 NOTE — PROGRESS NOTES
Vero Desouza is a 36year old female  Patient's last menstrual period was 12/10/2020. here for annual exam.       Last seen 19. Last pap 2017 normal with neg HPV    Doing well with generic Trinessa.   Has cervical polyp that she wants of Systems:  Constitutional:  Denies fatigue, night sweats, hot flashes  Eyes:  denies blurred or double vision  Cardiovascular:  denies chest pain or palpitations  Respiratory:  denies shortness of breath  Gastrointestinal:  denies heartburn, abdominal pa physical exam.    1. Encounter for gynecological examination  Pap and HPV. Will do Pap/HPV q 3 years. Annual exams encouraged. Order for mammogram to be done 2/2021. Refill generic Trinessa. RTC 1 year or prn    2.  Encounter for screening mammogram

## 2021-01-04 LAB — LAST PAP RESULT: NORMAL

## 2021-01-11 RX ORDER — NORGESTIMATE AND ETHINYL ESTRADIOL 7DAYSX3 28
KIT ORAL
Qty: 28 TABLET | Refills: 0 | OUTPATIENT
Start: 2021-01-11

## 2021-01-23 RX ORDER — NORGESTIMATE AND ETHINYL ESTRADIOL 7DAYSX3 28
1 KIT ORAL DAILY
Qty: 3 PACKAGE | Refills: 3 | OUTPATIENT
Start: 2021-01-23

## 2021-03-01 ENCOUNTER — TELEPHONE (OUTPATIENT)
Dept: OBGYN CLINIC | Facility: CLINIC | Age: 41
End: 2021-03-01

## 2021-03-01 DIAGNOSIS — N63.20 LEFT BREAST LUMP: Primary | ICD-10-CM

## 2021-03-01 NOTE — TELEPHONE ENCOUNTER
Order changed and pt informed of JLKs recs. Pt asking if she can still keep her same appt on 3/4. Called mammography and was informed that pt can still keep same appt even though order was changed from screening mammo to diagnostic with US.  Pt verbalized u

## 2021-03-01 NOTE — TELEPHONE ENCOUNTER
Pt has mammogram scheduled for 3/4. Next available appt with Dr. Delle Aschoff is 4/12. Pt would like to review test results sooner than that if possible. Please advise, pt has questions. ashia ang supervision

## 2021-03-01 NOTE — TELEPHONE ENCOUNTER
Pt reports grape sized lump to left breast since 2/26/2021. Pt states lump has decreased in size \"just a little smaller\" since 2/26/2021. Pt reports pain only when pressing on lump.  Pt denies redness, warmth, nipple discharge, or skin changes to breast.

## 2021-03-04 ENCOUNTER — TELEPHONE (OUTPATIENT)
Dept: OBGYN CLINIC | Facility: CLINIC | Age: 41
End: 2021-03-04

## 2021-03-04 ENCOUNTER — HOSPITAL ENCOUNTER (OUTPATIENT)
Dept: MAMMOGRAPHY | Facility: HOSPITAL | Age: 41
Discharge: HOME | End: 2021-03-04
Attending: OBSTETRICS & GYNECOLOGY
Payer: COMMERCIAL

## 2021-03-04 DIAGNOSIS — N63.20 LEFT BREAST LUMP: ICD-10-CM

## 2021-03-04 DIAGNOSIS — Z12.31 ENCOUNTER FOR SCREENING MAMMOGRAM FOR BREAST CANCER: ICD-10-CM

## 2021-03-04 NOTE — TELEPHONE ENCOUNTER
Pt came in for 340pm mammo appt and was turned away, states she was told they cannot do a diagnostic mammogram after 2pm.     Spoke to [facility] at mammography.  When the pt's order was changed from screening to diagnostic, the appt should have been changed alto

## 2021-03-04 NOTE — TELEPHONE ENCOUNTER
Patient calling to speak with nurse regarding ultra sound mammogram, indicates it should have been switched in the system. Please call as soon as possible at:[phone],thanks.   *order in system is in correct

## 2021-03-12 ENCOUNTER — HOSPITAL ENCOUNTER (OUTPATIENT)
Dept: ULTRASOUND IMAGING | Facility: HOSPITAL | Age: 41
Discharge: HOME | End: 2021-03-12
Attending: OBSTETRICS & GYNECOLOGY
Payer: COMMERCIAL

## 2021-03-12 ENCOUNTER — TELEPHONE (OUTPATIENT)
Dept: OBGYN CLINIC | Facility: CLINIC | Age: 41
End: 2021-03-12

## 2021-03-12 ENCOUNTER — HOSPITAL ENCOUNTER (OUTPATIENT)
Dept: MAMMOGRAPHY | Facility: HOSPITAL | Age: 41
Discharge: HOME | End: 2021-03-12
Attending: OBSTETRICS & GYNECOLOGY
Payer: COMMERCIAL

## 2021-03-12 DIAGNOSIS — N63.20 LEFT BREAST LUMP: ICD-10-CM

## 2021-03-12 PROCEDURE — 77062 BREAST TOMOSYNTHESIS BI: CPT | Performed by: OBSTETRICS & GYNECOLOGY

## 2021-03-12 PROCEDURE — 77066 DX MAMMO INCL CAD BI: CPT | Performed by: OBSTETRICS & GYNECOLOGY

## 2021-03-12 PROCEDURE — 76642 ULTRASOUND BREAST LIMITED: CPT | Performed by: OBSTETRICS & GYNECOLOGY

## 2021-03-12 NOTE — IMAGING NOTE
This nurse introduced self and role of breast coordinator. Discussed recommended breast biopsy with patient. Pt was recommended by Dr. Estevan Cantor to have a left  breast ultrasound guided biopsy. Hx palpable x 2 weeks. She is .   Her spouse is Silke Lob being taken related to a cardiac condition should be held at the  direction of your physician. Radiology's preference is to hold this medication for 7  days prior to biopsy. Informed patient to call cardiologist to go off aspirin.   She denies usage     - restrictions – nothing heavier than a gallon of milk for 24-48 hours after the procedure.       Discussed with patient that some soreness and bruising is normal after biopsy but that prolonged or increased pain and bruising should be reported to the orderin

## 2021-03-12 NOTE — TELEPHONE ENCOUNTER
Pt is at [facility] trying to do mammogram, labs says she has to do regular mammogram first before she can do diagnostic mammogram. Ferdinand Raoch said she should do diagnostic because pt found a lump in her breast

## 2021-03-12 NOTE — TELEPHONE ENCOUNTER
Denisse from mammogram dept states they have pt there now and is refusing the mammogram and states only there for US but cant just do the [address].  Please advise

## 2021-03-15 ENCOUNTER — HOSPITAL ENCOUNTER (OUTPATIENT)
Dept: MAMMOGRAPHY | Facility: HOSPITAL | Age: 41
Discharge: HOME | End: 2021-03-15
Attending: OBSTETRICS & GYNECOLOGY
Payer: COMMERCIAL

## 2021-03-15 ENCOUNTER — HOSPITAL ENCOUNTER (OUTPATIENT)
Dept: ULTRASOUND IMAGING | Facility: HOSPITAL | Age: 41
Discharge: HOME | End: 2021-03-15
Attending: OBSTETRICS & GYNECOLOGY
Payer: COMMERCIAL

## 2021-03-15 VITALS — HEART RATE: 103 BPM | DIASTOLIC BLOOD PRESSURE: 70 MMHG | SYSTOLIC BLOOD PRESSURE: 146 MMHG

## 2021-03-15 DIAGNOSIS — N63.20 BREAST MASS, LEFT: ICD-10-CM

## 2021-03-15 PROCEDURE — 77065 DX MAMMO INCL CAD UNI: CPT | Performed by: OBSTETRICS & GYNECOLOGY

## 2021-03-15 PROCEDURE — 88305 TISSUE EXAM BY PATHOLOGIST: CPT | Performed by: OBSTETRICS & GYNECOLOGY

## 2021-03-15 PROCEDURE — 19083 BX BREAST 1ST LESION US IMAG: CPT | Performed by: OBSTETRICS & GYNECOLOGY

## 2021-03-15 NOTE — PROCEDURES
[facility]  Procedure Note    Lakia Calle Patient Status:  Outpatient    1980 MRN [id number]   Location [address] Attending Jeaneth Bee MD   Hosp Day # 0 PCP Nadeem Bolanos MD     Proce

## 2021-03-16 ENCOUNTER — TELEPHONE (OUTPATIENT)
Dept: ULTRASOUND IMAGING | Facility: HOSPITAL | Age: 41
End: 2021-03-16

## 2021-03-16 NOTE — TELEPHONE ENCOUNTER
Alona Rodriguez is s/p biopsy . Phoned and introduced myself as breast coordinator . Reinforced to patient  post biopsy care and instructions .  NO c/o post procedure     Informed  and shared the pathology results as well as the recommendations from

## 2021-05-16 ENCOUNTER — IMMUNIZATION (OUTPATIENT)
Dept: LAB | Facility: HOSPITAL | Age: 41
End: 2021-05-16
Attending: EMERGENCY MEDICINE
Payer: COMMERCIAL

## 2021-05-16 DIAGNOSIS — Z23 NEED FOR VACCINATION: Primary | ICD-10-CM

## 2021-05-16 PROCEDURE — 0001A SARSCOV2 VAC 30MCG/0.3ML IM: CPT

## 2021-06-01 ENCOUNTER — HOSPITAL ENCOUNTER (EMERGENCY)
Facility: HOSPITAL | Age: 41
Discharge: HOME | End: 2021-06-01
Attending: EMERGENCY MEDICINE
Payer: COMMERCIAL

## 2021-06-01 ENCOUNTER — APPOINTMENT (OUTPATIENT)
Dept: MRI IMAGING | Facility: HOSPITAL | Age: 41
End: 2021-06-01
Attending: EMERGENCY MEDICINE
Payer: COMMERCIAL

## 2021-06-01 ENCOUNTER — NURSE TRIAGE (OUTPATIENT)
Dept: INTERNAL MEDICINE CLINIC | Facility: CLINIC | Age: 41
End: 2021-06-01

## 2021-06-01 VITALS
HEIGHT: 64 IN | DIASTOLIC BLOOD PRESSURE: 92 MMHG | TEMPERATURE: 99 F | SYSTOLIC BLOOD PRESSURE: 151 MMHG | WEIGHT: 243 LBS | BODY MASS INDEX: 41.48 KG/M2 | OXYGEN SATURATION: 99 % | HEART RATE: 104 BPM | RESPIRATION RATE: 20 BRPM

## 2021-06-01 DIAGNOSIS — R03.0 ELEVATED BLOOD PRESSURE READING: ICD-10-CM

## 2021-06-01 DIAGNOSIS — R42 DIZZINESS: Primary | ICD-10-CM

## 2021-06-01 PROCEDURE — 36415 COLL VENOUS BLD VENIPUNCTURE: CPT

## 2021-06-01 PROCEDURE — 81025 URINE PREGNANCY TEST: CPT

## 2021-06-01 PROCEDURE — 80048 BASIC METABOLIC PNL TOTAL CA: CPT | Performed by: EMERGENCY MEDICINE

## 2021-06-01 PROCEDURE — 87086 URINE CULTURE/COLONY COUNT: CPT | Performed by: EMERGENCY MEDICINE

## 2021-06-01 PROCEDURE — 85025 COMPLETE CBC W/AUTO DIFF WBC: CPT | Performed by: EMERGENCY MEDICINE

## 2021-06-01 PROCEDURE — 81001 URINALYSIS AUTO W/SCOPE: CPT | Performed by: EMERGENCY MEDICINE

## 2021-06-01 PROCEDURE — 93005 ELECTROCARDIOGRAM TRACING: CPT

## 2021-06-01 PROCEDURE — 93010 ELECTROCARDIOGRAM REPORT: CPT | Performed by: EMERGENCY MEDICINE

## 2021-06-01 PROCEDURE — 84484 ASSAY OF TROPONIN QUANT: CPT | Performed by: EMERGENCY MEDICINE

## 2021-06-01 PROCEDURE — 70551 MRI BRAIN STEM W/O DYE: CPT | Performed by: EMERGENCY MEDICINE

## 2021-06-01 PROCEDURE — 99284 EMERGENCY DEPT VISIT MOD MDM: CPT

## 2021-06-01 RX ORDER — MECLIZINE HYDROCHLORIDE 25 MG/1
25 TABLET ORAL ONCE
Status: COMPLETED | OUTPATIENT
Start: 2021-06-01 | End: 2021-06-01

## 2021-06-01 RX ORDER — MECLIZINE HYDROCHLORIDE CHEWABLE TABLETS 25 MG/1
25 TABLET, CHEWABLE ORAL 3 TIMES DAILY PRN
Qty: 9 TABLET | Refills: 0 | Status: SHIPPED | OUTPATIENT
Start: 2021-06-01 | End: 2021-06-11

## 2021-06-01 NOTE — ED INITIAL ASSESSMENT (HPI)
Woke up today feeling lightheaded. Checked bp and it was Costa Halie. \" no history of htn. Ambulating with steady gait. Clear speech. Equal strength bilaterally.

## 2021-06-01 NOTE — ED PROVIDER NOTES
Patient Seen in: [facility] AND [facility] Emergency Department      History   Patient presents with:  Hypertension  Dizziness    Stated Complaint: Dizziness    HPI/Subjective:   HPI    35 y/o female w/ no h/o dx'ed HTN who states she has had elevated pressures and time. Appears well-developed. No distress. Head: Normocephalic and atraumatic. Eyes: Conjunctivae are normal. Pupils are equal, round, and reactive to light. Neck: Normal range of motion. Neck supple.    Cardiovascular: Normal rate, regular rhyth Final result                 Please view results for these tests on the individual orders. URINE CULTURE, ROUTINE     EKG    Rate, intervals and axes as noted on EKG Report.   Rate: 103  Rhythm: Sinus Rhythm  Reading: No acute ischemic changes Impression:  Dizziness  (primary encounter diagnosis)  Elevated blood pressure reading     Disposition:  Discharge  6/1/2021  7:40 pm    Follow-up:  Metro Saver, MD  [address] [facility]  [phone]    Schedule an appointment as

## 2021-06-01 NOTE — TELEPHONE ENCOUNTER
Action Requested: Summary for Provider     []  Critical Lab, Recommendations Needed  [] Need Additional Advice  []   FYI    []   Need Orders  [] Need Medications Sent to Pharmacy  []  Other     SUMMARY: patient calling in with \"feeling off balance today

## 2021-06-02 NOTE — TELEPHONE ENCOUNTER
Seen in [address] [place] [address] ER yesterday for hypertension:           Clinical Impression:  Dizziness  (primary encounter diagnosis)  Elevated blood pressure reading      Disposition:  Discharge  6/1/2021  7:40 pm     Follow-up:  Maia Copeland MD  [address]

## 2021-06-02 NOTE — ED QUICK NOTES
Patient cleared for discharge by MD. Raphael with patient. Patient discharge instructions reviewed with patient including when and how to follow up  with healthcare provider and when to seek medical treatment.

## 2021-06-04 ENCOUNTER — OFFICE VISIT (OUTPATIENT)
Dept: INTERNAL MEDICINE CLINIC | Facility: CLINIC | Age: 41
End: 2021-06-04
Payer: COMMERCIAL

## 2021-06-04 ENCOUNTER — LAB ENCOUNTER (OUTPATIENT)
Dept: LAB | Facility: HOSPITAL | Age: 41
End: 2021-06-04
Attending: INTERNAL MEDICINE
Payer: COMMERCIAL

## 2021-06-04 VITALS
HEART RATE: 114 BPM | WEIGHT: 244 LBS | HEIGHT: 64 IN | BODY MASS INDEX: 41.66 KG/M2 | DIASTOLIC BLOOD PRESSURE: 88 MMHG | SYSTOLIC BLOOD PRESSURE: 148 MMHG | RESPIRATION RATE: 18 BRPM

## 2021-06-04 DIAGNOSIS — M79.671 PAIN IN BOTH FEET: ICD-10-CM

## 2021-06-04 DIAGNOSIS — I10 ESSENTIAL HYPERTENSION: ICD-10-CM

## 2021-06-04 DIAGNOSIS — Z00.00 ROUTINE PHYSICAL EXAMINATION: ICD-10-CM

## 2021-06-04 DIAGNOSIS — Z00.00 ROUTINE PHYSICAL EXAMINATION: Primary | ICD-10-CM

## 2021-06-04 DIAGNOSIS — M79.672 PAIN IN BOTH FEET: ICD-10-CM

## 2021-06-04 PROCEDURE — 80061 LIPID PANEL: CPT

## 2021-06-04 PROCEDURE — 3008F BODY MASS INDEX DOCD: CPT | Performed by: INTERNAL MEDICINE

## 2021-06-04 PROCEDURE — 3077F SYST BP >= 140 MM HG: CPT | Performed by: INTERNAL MEDICINE

## 2021-06-04 PROCEDURE — 36415 COLL VENOUS BLD VENIPUNCTURE: CPT

## 2021-06-04 PROCEDURE — 84443 ASSAY THYROID STIM HORMONE: CPT

## 2021-06-04 PROCEDURE — 99396 PREV VISIT EST AGE 40-64: CPT | Performed by: INTERNAL MEDICINE

## 2021-06-04 PROCEDURE — 80053 COMPREHEN METABOLIC PANEL: CPT

## 2021-06-04 PROCEDURE — 82607 VITAMIN B-12: CPT

## 2021-06-04 PROCEDURE — 3079F DIAST BP 80-89 MM HG: CPT | Performed by: INTERNAL MEDICINE

## 2021-06-04 PROCEDURE — 82306 VITAMIN D 25 HYDROXY: CPT

## 2021-06-04 RX ORDER — NIFEDIPINE 30 MG/1
30 TABLET, FILM COATED, EXTENDED RELEASE ORAL DAILY
Qty: 30 TABLET | Refills: 5 | Status: SHIPPED | OUTPATIENT
Start: 2021-06-04 | End: 2021-12-07

## 2021-06-04 NOTE — PROGRESS NOTES
HPI:    Patient ID: Araceli Wheeler is a 36year old female. HPI  Patient is here requesting a physical exam.  Last seen in the office in October 2019 for general physical.  Blood pressure was a little bit borderline at that time.   Since that time sh Surgical History:   Procedure Laterality Date   • COLONOSCOPY N/A 3/14/2017    Procedure: COLONOSCOPY;  Surgeon: Erica Man MD;  Location: [facility] ENDOSCOPY      Family History   Problem Relation Age of Onset   • Hypertension Father    • Diabete Rhythm: Normal rate. Heart sounds: Normal heart sounds. No murmur heard. No friction rub. No gallop. Pulmonary:      Effort: Pulmonary effort is normal.      Breath sounds: Normal breath sounds. No wheezing or rales.    Abdominal:      General: Moe Petit Patient has no other medical conditions that would point in 1 direction or another for medication choice. However she is an -American female at an age where conception is still possible.   He is planning on stopping the birth control pill sometime s

## 2021-06-04 NOTE — PATIENT INSTRUCTIONS
Managing High Blood Pressure with the DASH Diet  What you eat can help lower your blood pressure and reduce your risk for stroke and heart disease.   One such diet, the Dietary Approaches to Stop Hypertension (DASH) diet, has been shown to reduce blood pr recommends menus containing 2,300 mg of sodium. The lower sodium DASH diet contains up to 1,500 mg of sodium a day.  (One teaspoon of salt contains 2,300 mg of sodium.)   Further, following the DASH diet may delay your need to take blood pressure lowering m or jam, half-ounce jellybeans, or 8 ounces of lemonade. Although the DASH diet isn't designed for weight loss, it can promote it if you reduce the number of servings you eat. Most of the food the diet features is big on volume and low in calories.   Still, substitute for professional medical care. Always follow your healthcare professional's instructions.

## 2021-06-07 ENCOUNTER — IMMUNIZATION (OUTPATIENT)
Dept: LAB | Facility: HOSPITAL | Age: 41
End: 2021-06-07
Attending: EMERGENCY MEDICINE
Payer: COMMERCIAL

## 2021-06-07 DIAGNOSIS — Z23 NEED FOR VACCINATION: Primary | ICD-10-CM

## 2021-06-07 PROCEDURE — 0002A SARSCOV2 VAC 30MCG/0.3ML IM: CPT

## 2021-07-26 ENCOUNTER — NURSE TRIAGE (OUTPATIENT)
Dept: INTERNAL MEDICINE CLINIC | Facility: CLINIC | Age: 41
End: 2021-07-26

## 2021-07-26 NOTE — TELEPHONE ENCOUNTER
Action Requested: Summary for Provider     []  Critical Lab, Recommendations Needed  [] Need Additional Advice  []   FYI    []   Need Orders  [] Need Medications Sent to Pharmacy  []  Other     SUMMARY:   Please advise    The patient stated 1 month ago was

## 2021-07-27 NOTE — TELEPHONE ENCOUNTER
Advised patient of 's note. Patient verbalized understanding  Offered to schedule appointment but patient states she will need to look at her schedule and give us a call back.

## 2021-07-27 NOTE — TELEPHONE ENCOUNTER
Please have patient make an appointment with available provider      YOVANA Watson  Working with [facility]

## 2021-08-09 ENCOUNTER — OFFICE VISIT (OUTPATIENT)
Dept: FAMILY MEDICINE CLINIC | Facility: CLINIC | Age: 41
End: 2021-08-09
Payer: COMMERCIAL

## 2021-08-09 VITALS
OXYGEN SATURATION: 98 % | SYSTOLIC BLOOD PRESSURE: 138 MMHG | BODY MASS INDEX: 42.34 KG/M2 | HEIGHT: 64 IN | HEART RATE: 109 BPM | DIASTOLIC BLOOD PRESSURE: 86 MMHG | WEIGHT: 248 LBS | RESPIRATION RATE: 16 BRPM

## 2021-08-09 DIAGNOSIS — M72.2 PLANTAR FASCIITIS, BILATERAL: Primary | ICD-10-CM

## 2021-08-09 DIAGNOSIS — E66.01 CLASS 3 SEVERE OBESITY DUE TO EXCESS CALORIES WITHOUT SERIOUS COMORBIDITY WITH BODY MASS INDEX (BMI) OF 40.0 TO 44.9 IN ADULT (HCC): ICD-10-CM

## 2021-08-09 DIAGNOSIS — M25.471 RIGHT ANKLE SWELLING: ICD-10-CM

## 2021-08-09 PROBLEM — E66.813 CLASS 3 SEVERE OBESITY DUE TO EXCESS CALORIES WITHOUT SERIOUS COMORBIDITY WITH BODY MASS INDEX (BMI) OF 40.0 TO 44.9 IN ADULT (HCC): Status: ACTIVE | Noted: 2021-08-09

## 2021-08-09 PROBLEM — E66.813 CLASS 3 SEVERE OBESITY DUE TO EXCESS CALORIES WITHOUT SERIOUS COMORBIDITY WITH BODY MASS INDEX (BMI) OF 40.0 TO 44.9 IN ADULT: Status: ACTIVE | Noted: 2021-08-09

## 2021-08-09 PROCEDURE — 3079F DIAST BP 80-89 MM HG: CPT | Performed by: PHYSICIAN ASSISTANT

## 2021-08-09 PROCEDURE — 3008F BODY MASS INDEX DOCD: CPT | Performed by: PHYSICIAN ASSISTANT

## 2021-08-09 PROCEDURE — 3075F SYST BP GE 130 - 139MM HG: CPT | Performed by: PHYSICIAN ASSISTANT

## 2021-08-09 PROCEDURE — 99203 OFFICE O/P NEW LOW 30 MIN: CPT | Performed by: PHYSICIAN ASSISTANT

## 2021-08-09 NOTE — PATIENT INSTRUCTIONS
Ice feet and ankle daily for 15-20 minutes (use frozen water bottle to roll the feet on)  Elevate legs as much as possible  Ankle compression sleeve or ace bandage when up and active during the day  Take advil or aleve twice daily for 1 week  Low sodium di

## 2021-08-09 NOTE — PROGRESS NOTES
HPI/Subjective:   Patient ID: Yossi Castellanos is a 36year old female. HPI   Patient presents today with concerns of right ankle swelling for the last several days. She has history of bilateral plantar fasciitis but has been worse on the right side. Reported on 8/9/2021 ) 3 Package 3     Allergies:No Known Allergies    Objective:   Physical Exam  Vitals and nursing note reviewed. Constitutional:       Appearance: She is well-developed. HENT:      Head: Normocephalic and atraumatic.    Eyes:      Co exercise. Follow-up in 4 weeks to reassess. No orders of the defined types were placed in this encounter.       Meds This Visit:  Requested Prescriptions      No prescriptions requested or ordered in this encounter       Imaging & Referrals:  None

## 2021-09-03 NOTE — TELEPHONE ENCOUNTER
Pt is under the misunderstanding that she is only to have a breast US. Informed pt per EARNESTINEK notes on 3/1 pt is to do Diag Matthew mammo with US. Pt verbalized understanding. denies

## 2021-09-13 ENCOUNTER — OFFICE VISIT (OUTPATIENT)
Dept: FAMILY MEDICINE CLINIC | Facility: CLINIC | Age: 41
End: 2021-09-13
Payer: COMMERCIAL

## 2021-09-13 VITALS
OXYGEN SATURATION: 98 % | SYSTOLIC BLOOD PRESSURE: 130 MMHG | HEART RATE: 88 BPM | HEIGHT: 64 IN | BODY MASS INDEX: 41.45 KG/M2 | WEIGHT: 242.81 LBS | DIASTOLIC BLOOD PRESSURE: 78 MMHG

## 2021-09-13 DIAGNOSIS — E66.01 CLASS 3 SEVERE OBESITY DUE TO EXCESS CALORIES WITHOUT SERIOUS COMORBIDITY WITH BODY MASS INDEX (BMI) OF 40.0 TO 44.9 IN ADULT (HCC): ICD-10-CM

## 2021-09-13 DIAGNOSIS — M72.2 PLANTAR FASCIITIS OF RIGHT FOOT: Primary | ICD-10-CM

## 2021-09-13 PROCEDURE — 3075F SYST BP GE 130 - 139MM HG: CPT | Performed by: PHYSICIAN ASSISTANT

## 2021-09-13 PROCEDURE — 3078F DIAST BP <80 MM HG: CPT | Performed by: PHYSICIAN ASSISTANT

## 2021-09-13 PROCEDURE — 3008F BODY MASS INDEX DOCD: CPT | Performed by: PHYSICIAN ASSISTANT

## 2021-09-13 PROCEDURE — 99214 OFFICE O/P EST MOD 30 MIN: CPT | Performed by: PHYSICIAN ASSISTANT

## 2021-09-13 RX ORDER — PHENTERMINE HYDROCHLORIDE 37.5 MG/1
37.5 TABLET ORAL
COMMUNITY
End: 2021-09-13

## 2021-09-13 RX ORDER — PHENTERMINE HYDROCHLORIDE 37.5 MG/1
37.5 TABLET ORAL
Qty: 30 TABLET | Refills: 2 | Status: SHIPPED | OUTPATIENT
Start: 2021-09-13 | End: 2021-11-11

## 2021-09-13 RX ORDER — CHROMIUM PICOLINATE 200 MCG
CAPSULE ORAL
COMMUNITY

## 2021-09-13 NOTE — PROGRESS NOTES
Subjective:   Patient ID: Lizy Baez is a 36year old female. HPI   Patient presents today in follow-up from last visit.   At that time we addressed her chronic letter fasciitis which was more flared up on the right side causing right ankle swell Exam  Vitals and nursing note reviewed. Constitutional:       Appearance: She is well-developed. HENT:      Head: Normocephalic and atraumatic.    Eyes:      Conjunctiva/sclera: Conjunctivae normal.      Pupils: Pupils are equal, round, and reactive to

## 2021-11-11 ENCOUNTER — OFFICE VISIT (OUTPATIENT)
Dept: FAMILY MEDICINE CLINIC | Facility: CLINIC | Age: 41
End: 2021-11-11
Payer: COMMERCIAL

## 2021-11-11 VITALS
HEART RATE: 84 BPM | HEIGHT: 64 IN | WEIGHT: 235.63 LBS | RESPIRATION RATE: 18 BRPM | SYSTOLIC BLOOD PRESSURE: 128 MMHG | OXYGEN SATURATION: 96 % | BODY MASS INDEX: 40.23 KG/M2 | TEMPERATURE: 97 F | DIASTOLIC BLOOD PRESSURE: 76 MMHG

## 2021-11-11 DIAGNOSIS — I10 ESSENTIAL HYPERTENSION: ICD-10-CM

## 2021-11-11 DIAGNOSIS — E66.01 CLASS 3 SEVERE OBESITY DUE TO EXCESS CALORIES WITHOUT SERIOUS COMORBIDITY WITH BODY MASS INDEX (BMI) OF 40.0 TO 44.9 IN ADULT (HCC): ICD-10-CM

## 2021-11-11 DIAGNOSIS — M72.2 PLANTAR FASCIITIS, RIGHT: Primary | ICD-10-CM

## 2021-11-11 PROCEDURE — 3078F DIAST BP <80 MM HG: CPT | Performed by: PHYSICIAN ASSISTANT

## 2021-11-11 PROCEDURE — 3008F BODY MASS INDEX DOCD: CPT | Performed by: PHYSICIAN ASSISTANT

## 2021-11-11 PROCEDURE — 99214 OFFICE O/P EST MOD 30 MIN: CPT | Performed by: PHYSICIAN ASSISTANT

## 2021-11-11 PROCEDURE — 3074F SYST BP LT 130 MM HG: CPT | Performed by: PHYSICIAN ASSISTANT

## 2021-11-11 RX ORDER — PHENTERMINE HYDROCHLORIDE 37.5 MG/1
37.5 TABLET ORAL
Qty: 30 TABLET | Refills: 2 | Status: SHIPPED | OUTPATIENT
Start: 2021-11-11

## 2021-11-11 RX ORDER — TOPIRAMATE 25 MG/1
25 TABLET ORAL 2 TIMES DAILY
Qty: 60 TABLET | Refills: 2 | Status: SHIPPED | OUTPATIENT
Start: 2021-11-11 | End: 2022-02-03

## 2021-11-11 NOTE — PROGRESS NOTES
Subjective:   Patient ID: Leann Nogueira is a 39year old female. HPI   Patient presents today for follow-up of weight management. She was last seen in the office 2 months ago and continued on phentermine.   She is tolerating medication well without 1 tablet (30 mg total) by mouth daily. 30 tablet 5     Allergies:No Known Allergies    Objective:   Physical Exam  Vitals and nursing note reviewed. Constitutional:       Appearance: She is well-developed. HENT:      Head: Normocephalic and atraumatic. in this encounter.       Meds This Visit:  Requested Prescriptions     Signed Prescriptions Disp Refills   • Phentermine HCl 37.5 MG Oral Tab 30 tablet 2     Sig: Take 1 tablet (37.5 mg total) by mouth every morning before breakfast.   • topiramate 25 MG Or

## 2021-12-07 RX ORDER — NIFEDIPINE 30 MG/1
30 TABLET, FILM COATED, EXTENDED RELEASE ORAL DAILY
Qty: 90 TABLET | Refills: 1 | Status: SHIPPED | OUTPATIENT
Start: 2021-12-07 | End: 2022-03-17

## 2021-12-07 NOTE — TELEPHONE ENCOUNTER
Refill passed per [facility] protocol.      Requested Prescriptions   Pending Prescriptions Disp Refills    NIFEDIPINE 30 MG Oral Tablet 24 Hr [Pharmacy Med Name: NIFEDIPINE ER 30 MG TABLET] 90 tablet 0     Sig: TAKE 1 TABLET BY MOUTH EVERY DAY        Hypertensive Medications Protocol Passed - 12/7/2021 12:27 AM        Passed - CMP or BMP in past 12 months        Passed - Appointment in past 6 or next 3 months        Passed - GFR  > 50     Lab Results   Component Value Date    GFRAA 108 06/04/2021                         Recent Outpatient Visits              3 weeks ago Plantar fasciitis, right    [facility], [address], [place], [place]    Office Visit    2 months ago Plantar fasci[facility] of right foot    [facility] [place]    Office Visit    4 months ago Plantar fasciitis, bilateral    [facility], [facility] [place], [place]    Office Visit    6 months ago Routine physical examination    [facility], [address], Viry Cheung MD    Office Visit    11 months ago Encounter for gynecological examination    [facility] for Linda Campos MD    Office Visit

## 2021-12-26 ENCOUNTER — IMMUNIZATION (OUTPATIENT)
Dept: LAB | Facility: HOSPITAL | Age: 41
End: 2021-12-26
Attending: EMERGENCY MEDICINE
Payer: COMMERCIAL

## 2021-12-26 DIAGNOSIS — Z23 NEED FOR VACCINATION: Primary | ICD-10-CM

## 2021-12-26 PROCEDURE — 0004A SARSCOV2 VAC 30MCG/0.3ML IM: CPT

## 2022-02-03 RX ORDER — TOPIRAMATE 25 MG/1
TABLET ORAL
Qty: 180 TABLET | Refills: 0 | Status: SHIPPED | OUTPATIENT
Start: 2022-02-03 | End: 2022-03-17

## 2022-02-14 ENCOUNTER — TELEPHONE (OUTPATIENT)
Dept: OBGYN CLINIC | Facility: CLINIC | Age: 42
End: 2022-02-14

## 2022-02-14 NOTE — TELEPHONE ENCOUNTER
Patient scheduled an annual for 6/22. Hoping a mammogram order can be placed in advance of that. Please advise.

## 2022-02-14 NOTE — TELEPHONE ENCOUNTER
Pts last annual exam was 12/29/2020. Pt needs to schedule annual exam before mammo order can be addressed. Please assist pt with scheduling. Thanks.

## 2022-03-14 ENCOUNTER — TELEPHONE (OUTPATIENT)
Dept: OBGYN CLINIC | Facility: CLINIC | Age: 42
End: 2022-03-14

## 2022-03-14 NOTE — TELEPHONE ENCOUNTER
Pt reports lmp 2/16/22 and cycles are normally 28-32 days except after receiving covid vaccine. Reports last 3 cycle dates 2/16/22, 1/29/22, 1/1/22. Pt agrees to PN appt rotation with all providers in group. Informed pt we would schedule OBN during the first week of April but the schedule is not available yet. Informed pt we will call her back later this week or early next week to schedule. Advised to start PNV with folic acid, iron and dha. Also advised pt to cancel her mammo appt on 3/17 because mammo is not done during pregnancy. Pt agrees.

## 2022-03-16 NOTE — TELEPHONE ENCOUNTER
Called pt and informed the OBN schedule is available for April. Assisted pt with scheduling OBN on 4/9. Also scheduled NPN on 4/13. Pt is aware she will have to do labs after her appt on 4/9.

## 2022-03-17 ENCOUNTER — TELEMEDICINE (OUTPATIENT)
Dept: FAMILY MEDICINE CLINIC | Facility: CLINIC | Age: 42
End: 2022-03-17
Payer: COMMERCIAL

## 2022-03-17 DIAGNOSIS — I10 ESSENTIAL HYPERTENSION: ICD-10-CM

## 2022-03-17 DIAGNOSIS — Z34.90 EARLY STAGE OF PREGNANCY: Primary | ICD-10-CM

## 2022-03-17 PROCEDURE — 99214 OFFICE O/P EST MOD 30 MIN: CPT | Performed by: PHYSICIAN ASSISTANT

## 2022-03-17 RX ORDER — LABETALOL 200 MG/1
200 TABLET, FILM COATED ORAL 2 TIMES DAILY
Qty: 60 TABLET | Refills: 2 | Status: SHIPPED | OUTPATIENT
Start: 2022-03-17

## 2022-03-22 ENCOUNTER — TELEPHONE (OUTPATIENT)
Dept: OBGYN CLINIC | Facility: CLINIC | Age: 42
End: 2022-03-22

## 2022-03-22 NOTE — TELEPHONE ENCOUNTER
Patient calling to initiate prenatal care  LMP 02/15/22  Patient is 7-8 weeks on 04/12  Confirmation Ultrasound and Appointment scheduled on 4/14  Breanna Bateman   Good time to return phone call Anytime     Patient had a miscarriage in 2007 and currently takes Labetalol for her blood pressure.

## 2022-03-22 NOTE — TELEPHONE ENCOUNTER
New patient.   LMP: 2/15/22  GA: 5 wks by provided lmp    Contacted patient. Reports regular periods. Denies any concerns currently. Advised ok to continue with labetalol. Bleeding/ER precautions given. To call with any vaginal bleeding/unusual pain. Briefly discussed first and second appointments. Patient states understanding and has no questions at this time.

## 2022-04-13 PROBLEM — O99.210 OBESITY AFFECTING PREGNANCY, ANTEPARTUM (HCC): Status: ACTIVE | Noted: 2022-04-13

## 2022-04-13 PROBLEM — O09.529 AMA (ADVANCED MATERNAL AGE) MULTIGRAVIDA 35+: Status: ACTIVE | Noted: 2022-04-13

## 2022-04-13 PROBLEM — O10.019: Status: ACTIVE | Noted: 2022-04-13

## 2022-04-13 PROBLEM — O09.529 AMA (ADVANCED MATERNAL AGE) MULTIGRAVIDA 35+ (HCC): Status: ACTIVE | Noted: 2022-04-13

## 2022-04-13 PROBLEM — O99.210 OBESITY AFFECTING PREGNANCY, ANTEPARTUM: Status: ACTIVE | Noted: 2022-04-13

## 2022-04-14 ENCOUNTER — OFFICE VISIT (OUTPATIENT)
Dept: OBGYN CLINIC | Facility: CLINIC | Age: 42
End: 2022-04-14
Payer: COMMERCIAL

## 2022-04-14 VITALS
WEIGHT: 242 LBS | HEART RATE: 74 BPM | BODY MASS INDEX: 41.32 KG/M2 | DIASTOLIC BLOOD PRESSURE: 72 MMHG | HEIGHT: 64 IN | SYSTOLIC BLOOD PRESSURE: 116 MMHG

## 2022-04-14 DIAGNOSIS — N91.2 AMENORRHEA: Primary | ICD-10-CM

## 2022-04-14 PROCEDURE — 3078F DIAST BP <80 MM HG: CPT | Performed by: OBSTETRICS & GYNECOLOGY

## 2022-04-14 PROCEDURE — 3074F SYST BP LT 130 MM HG: CPT | Performed by: OBSTETRICS & GYNECOLOGY

## 2022-04-14 PROCEDURE — 99203 OFFICE O/P NEW LOW 30 MIN: CPT | Performed by: OBSTETRICS & GYNECOLOGY

## 2022-04-14 PROCEDURE — 76856 US EXAM PELVIC COMPLETE: CPT | Performed by: OBSTETRICS & GYNECOLOGY

## 2022-04-14 PROCEDURE — 3008F BODY MASS INDEX DOCD: CPT | Performed by: OBSTETRICS & GYNECOLOGY

## 2022-04-14 RX ORDER — GLYCERIN/MINERAL OIL
LOTION (ML) TOPICAL
COMMUNITY

## 2022-04-14 NOTE — PROGRESS NOTES
Subjective:  Patient presents complaining of no menses. Positive home pregnancy test.  LMP 2/15/22. Previous pregnancies uncomplicated vaginal deliveries. No hypertension,  labor or diabetes. No family history of any inheritable diseases or congenital birth defects on either side of family. Went off OCP last year due to recent diagnosis of hypertension and didn't start different contraception. Objective:  /72   Pulse 74   Ht 64\"   Wt 242 lb (109.8 kg)   LMP 02/15/2022     Physical Examination:  General appearance: Well dressed, well nourished in no apparent distress  Neurologic/Psychiatric: Alert and oriented to person, place and time, mood normal, affect appropriate    Summary of Ultrasound Findings:  LMP 2/15/22  8w2day by LMP;  6G8ELA by ultrasound  Viable intrauterine pregnancy  Dates consistent with ultrasound. Final EDC:  22 by LMP  Normal ovaries bilaterally     Assessment/Plan:  Amenorrhea- Normal dating ultrasound  AMA, Obesity, CHtn- counseled regarding high risk, need for Level 2, ASA. Likely will desire cfDNA  Follow up 3 weeks for new OB visit. Prenatal vitamin with 0.4 mg folate, DHA. Optional prenatal screening tests reviewed including cfdna, carrier screen/CF, NT/first trimester screen, Quad/AFP, Level 2 ultrasound, amniocentesis/CVS.  Bleeding precautions provided. Diagnoses and all orders for this visit:    Amenorrhea  -     US PELVIS, ABDOMINAL [facility] ONLY; Future       Return in about 3 weeks (around 2022) for New OB Visit.

## 2022-04-15 ENCOUNTER — TELEPHONE (OUTPATIENT)
Dept: OBGYN CLINIC | Facility: CLINIC | Age: 42
End: 2022-04-15

## 2022-04-15 NOTE — TELEPHONE ENCOUNTER
Pt requesting to speak to the nurse as she has questions. Pt. States that she is pregnant. LMP: 2/15/2022. Pt. States that she did have ultrasound test done at [facility]. Pt was informed that she will need to gave her records sent to us for review to be accepted for prenatal care.  Pt states that she is not happy with current

## 2022-04-15 NOTE — TELEPHONE ENCOUNTER
Pt reports she is JLK pt. Pt had called before to start PN care but then canceled due to wanted a closer clinic to her home. States she saw a doctor for one visit and pt is not comfortable with clinic communication. States she has only done ob us and has not done labs. Pt requesting PN care with this group. Pt agrees to PN appt rotation with our 2 male and 4 female providers. Reports  lmp 2/15/22. Pt accepts OBN PC on 4/23. Also scheduled NPN appt on 4/27, pt is aware she will need to do PN labs after OBN appt on 4/23. Pt is taking PNV with iron, folic acid and dha.

## 2022-04-18 ENCOUNTER — TELEPHONE (OUTPATIENT)
Dept: OBGYN CLINIC | Facility: CLINIC | Age: 42
End: 2022-04-18

## 2022-04-18 NOTE — TELEPHONE ENCOUNTER
Notified patient that labs will be ordered at [facility] 4/23/22. She has NPN appt beauled on 4/27/22, but states she should be able to go in for labs on Sat 4/23/22. Patient verbalized understanding.

## 2022-04-21 ENCOUNTER — TELEPHONE (OUTPATIENT)
Dept: OBGYN CLINIC | Facility: CLINIC | Age: 42
End: 2022-04-21

## 2022-04-21 NOTE — TELEPHONE ENCOUNTER
LMP 2. OB ultrasound done at [facility] below. Pt will our patient. Her OBN PC  with us. Pt states she has very light pink spotting when she wipes after urinating. Pt she has urgency. Denies frequency, painful urination and burning with urination. Denies a fever. Denies cramping. Pt does not think the bleeding is vag, she thinks it is with urination. Do not see an ABO/RH for the pt. Pt reports she is JLK pt. Pt had called before to start PN care but then canceled due to wanted a closer clinic to her home. States she saw a doctor for one visit and pt is not comfortable with clinic communication. States she has only done ob us and has not done labs. Sent to [facility] for recs. OB Report Janak Vargas  Ob/Gyn [facility]  Patient / Exam Information Date of Exam:   Name: Richy Urias. Phys:  Patient ID: [id number] :  Ref. Phys:  Age: 39 Sonographer:  Indication: Sex: Female Exam Type:  LMP: 2022/02/15  DOC:  GA(LMP) 8w2d GAUTAM(LMP)  Grav:  Para:  Ab: Ect.:  GA(AUA) 8w6d GAUTAM(AUA)   2D Measurements AUA Value m1 m2 m3 Meth. GP GA  CRL ([place]lock) 21.52 mm 20.39 21.75 22.42 avg. >97.0% 8w6d  M-Mode Measurements Value m1 m2 m3 m4 m5 m6 Meth. A Fetal Heart Rate   bpm 159 avg.   Anatomical Survey  Fetal Heart  Cardiac Rhythm Normal  Fetal Description  Amniotic Fluid Normal  Graph GA Reference: GA(LMP)  Fetus: A  Comment

## 2022-04-21 NOTE — TELEPHONE ENCOUNTER
Informed pt that Yamile[address] Graff [address] stated she needs an u/a. Pt goes to [address] [place] [address]. Order placed for U/A. Informed pt to call  Us for the results. Pt going tomorrow.

## 2022-04-23 ENCOUNTER — NURSE ONLY (OUTPATIENT)
Dept: OBGYN CLINIC | Facility: CLINIC | Age: 42
End: 2022-04-23
Payer: COMMERCIAL

## 2022-04-23 VITALS — HEIGHT: 64 IN | BODY MASS INDEX: 42 KG/M2

## 2022-04-23 DIAGNOSIS — Z34.81 ENCOUNTER FOR SUPERVISION OF OTHER NORMAL PREGNANCY IN FIRST TRIMESTER: Primary | ICD-10-CM

## 2022-04-23 NOTE — PROGRESS NOTES
Pt seen for OBN PC appt today with no complaints. Known LMP with regular periods. Normal PN labs plus GTT, Hep C, CMP, 24 urine protein, sickle cell ordered. Pt advised all labs must be completed and resulted prior to MD appt. Pt scheduled NPN appt with MD.    Domingo Sharp name is Tyra All contact #[phone]; race: black   Occupation:  for life insurance     Pt with cHTN. She checks BP BID. Will bring log to NPN. MEDICAL HISTORY    Anemia Yes    Anesthetic complications No    Anxiety/Depression  Yes  Both, no meds but pt is feeling ok. Has therapist she can reach out to. Autoimmune Disorder No    Asthma  No    Cancer No    Diabetes  No    Gyne/breast Surgery No    Heart Disease No    Hepatitis/Liver Disease  No    History of blood transfusion No    History of abnormal pap No    Hypertension  Yes Takes Labetalol since April 2021   Infertility  No    Kidney Disease/Frequent UTIs  No    Medication Allergies No    Latex Allergies No    Food Allergies  No    Neurological Disorder/Epilepsy No    Operations/Hospitalizations No    TB exposure  No    Thyroid Dysfunction No    Trauma/Violence  No    Uterine Anomaly  No    Uterine Fibroids  No    Variocosities/DVTs No    Smoker No    Drug usage in prior year No    Alcohol No    Would you accept a blood transfusion? If no, are you a Jain?  Yes           INFECTION HISTORY    Chlamydia No    Pt or partner have hx of Genital Herpes No    Gonorrhea No    Hepatitis B No    HIV No    HPV No    MRSA No    Syphilis No    Tattoos No    Live with someone or Exposed to TB No    Rash or viral illness since LMP  No Had a rash, may have been allergy from new food   Varicella Yes    Recent Travel (or planned travel) to [facility] area for self and or partner No    Pets No        GENETICS SCREENING    Genetic Screening    Genetic Screening/Teratology Counseling- Includes patient, baby's father, or anyone in either family with:  Patient's age 28 years or older as of estimated date of delivery: Yes   Thalassemia ([facility], [place], [address], or  background): MCV less than 80: No   Neural tube defect (Meningomyelocele, Spina bifida, or Anencephaly): No   Congenital heart defect: No   Down syndrome: No   Bryant-Sachs (Ashkenazi Zoroastrianism, [place], [place]): No   Canavan disease (Ashkenazi Zoroastrianism): No   Familial dysautonomia (Ashkenazi Zoroastrianism): No   Sickle cell disease or trait (): No   Hemophilia or other blood disorders: No   Muscular dystrophy: No    Cystic fibrosis: No   Alleghany's chorea: No   Intellectual disability and/or autism: Yes (Comment: maternal uncle has disabilities)   If yes, was the person tested for Fragile X?: No   Other inherited genetic or chromosomal disorder: No   Maternal metabolic disorder (eg. Type 1 diabetes, PKU):  No   Patient or baby's father had child with birth defects not listed above: No   Recurrent pregnancy loss, or a stillbirth: No   Medications (including supplements, vitamins, herbs, or OTC drugs)/illicit/recreational drugs/alcohol since last menstrual period: No               MISC    Infant vaccinations  Yes

## 2022-04-25 ENCOUNTER — PATIENT MESSAGE (OUTPATIENT)
Dept: OBGYN CLINIC | Facility: CLINIC | Age: 42
End: 2022-04-25

## 2022-04-25 LAB
ABSOLUTE BASOPHILS: 20 CELLS/UL (ref 0–200)
ABSOLUTE EOSINOPHILS: 40 CELLS/UL (ref 15–500)
ABSOLUTE LYMPHOCYTES: 1993 CELLS/UL (ref 850–3900)
ABSOLUTE MONOCYTES: 607 CELLS/UL (ref 200–950)
ABSOLUTE NEUTROPHILS: 3940 CELLS/UL (ref 1500–7800)
ALBUMIN/GLOBULIN RATIO: 1.4 (CALC) (ref 1–2.5)
ALBUMIN: 3.9 G/DL (ref 3.6–5.1)
ALKALINE PHOSPHATASE: 90 U/L (ref 31–125)
ALT: 11 U/L (ref 6–29)
APPEARANCE: CLEAR
AST: 13 U/L (ref 10–30)
BASOPHILS: 0.3 %
BILIRUBIN, TOTAL: 0.4 MG/DL (ref 0.2–1.2)
BILIRUBIN: NEGATIVE
BUN: 13 MG/DL (ref 7–25)
CALCIUM: 9.4 MG/DL (ref 8.6–10.2)
CARBON DIOXIDE: 26 MMOL/L (ref 20–32)
CHLORIDE: 102 MMOL/L (ref 98–110)
COLOR: YELLOW
CREATININE: 0.57 MG/DL (ref 0.5–1.1)
EGFR IF AFRICN AM: 133 ML/MIN/1.73M2
EGFR IF NONAFRICN AM: 115 ML/MIN/1.73M2
EOSINOPHILS: 0.6 %
GLOBULIN: 2.8 G/DL (CALC) (ref 1.9–3.7)
GLUCOSE: 76 MG/DL (ref 65–99)
GLUCOSE: NEGATIVE
HEMATOCRIT: 30 % (ref 35–45)
HEMOGLOBIN: 10 G/DL (ref 11.7–15.5)
KETONES: NEGATIVE
LYMPHOCYTES: 30.2 %
MCH: 28.6 PG (ref 27–33)
MCHC: 33.3 G/DL (ref 32–36)
MCV: 85.7 FL (ref 80–100)
MONOCYTES: 9.2 %
MPV: 10.9 FL (ref 7.5–12.5)
NEUTROPHILS: 59.7 %
NITRITE: NEGATIVE
OCCULT BLOOD: NEGATIVE
PH: 7.5 (ref 5–8)
PLATELET COUNT: 349 THOUSAND/UL (ref 140–400)
POTASSIUM: 3.9 MMOL/L (ref 3.5–5.3)
PROTEIN, TOTAL: 6.7 G/DL (ref 6.1–8.1)
PROTEIN: NEGATIVE
RDW: 14.4 % (ref 11–15)
RED BLOOD CELL COUNT: 3.5 MILLION/UL (ref 3.8–5.1)
RUBELLA ANTIBODY (IGG): 3.35 INDEX
SICKLE CELL SCREEN: NEGATIVE
SODIUM: 135 MMOL/L (ref 135–146)
SPECIFIC GRAVITY: 1.01 (ref 1–1.03)
WHITE BLOOD CELL COUNT: 6.6 THOUSAND/UL (ref 3.8–10.8)

## 2022-04-25 NOTE — TELEPHONE ENCOUNTER
Spoke with patient. Await results of 24 hr urine. Per [facility], okay for NPN without 1 hr gtt. Can completed on Saturday.

## 2022-04-25 NOTE — TELEPHONE ENCOUNTER
Patient notified of negative UA/UC. She has had no further spotting since 4/21/22. She will call if spotting returns.

## 2022-04-26 ENCOUNTER — TELEPHONE (OUTPATIENT)
Dept: OBGYN CLINIC | Facility: CLINIC | Age: 42
End: 2022-04-26

## 2022-04-26 PROBLEM — O12.11: Status: ACTIVE | Noted: 2022-04-26

## 2022-04-26 LAB
CREATININE, 24 HOUR URINE: 3.94 G/24 H (ref 0.5–2.15)
PROTEIN, TOTAL, 24 HR UR: 320 MG/24 H
PROTEIN/CREATININE RATIO: 0.08 MG/MG CREAT
PROTEIN/CREATININE RATIO: 81 MG/G CREAT

## 2022-04-26 NOTE — TELEPHONE ENCOUNTER
Conchita Smallwood. This is a newly pregnant patient with hx of HTN on Labetalol 200 BID. I have not seen her We usually do baseline labs for pregnancy and 24 hour protein is mildly up at 320 but 24 hour urine Creatinine at 3.94 with normal serum creatinine. Does this patient need to see you in pregnancy? Thanks.     Daphney Buck

## 2022-04-27 ENCOUNTER — PATIENT MESSAGE (OUTPATIENT)
Dept: NEPHROLOGY | Facility: CLINIC | Age: 42
End: 2022-04-27

## 2022-04-27 ENCOUNTER — INITIAL PRENATAL (OUTPATIENT)
Dept: OBGYN CLINIC | Facility: CLINIC | Age: 42
End: 2022-04-27
Payer: COMMERCIAL

## 2022-04-27 ENCOUNTER — TELEPHONE (OUTPATIENT)
Dept: OBGYN CLINIC | Facility: CLINIC | Age: 42
End: 2022-04-27

## 2022-04-27 VITALS
WEIGHT: 239 LBS | DIASTOLIC BLOOD PRESSURE: 80 MMHG | SYSTOLIC BLOOD PRESSURE: 120 MMHG | HEART RATE: 90 BPM | BODY MASS INDEX: 41 KG/M2

## 2022-04-27 DIAGNOSIS — Z34.81 ENCOUNTER FOR SUPERVISION OF OTHER NORMAL PREGNANCY IN FIRST TRIMESTER: Primary | ICD-10-CM

## 2022-04-27 PROBLEM — N84.1 CERVICAL POLYP: Status: ACTIVE | Noted: 2022-04-27

## 2022-04-27 PROBLEM — O99.210 OBESITY AFFECTING PREGNANCY, ANTEPARTUM: Status: RESOLVED | Noted: 2022-04-13 | Resolved: 2022-04-27

## 2022-04-27 PROBLEM — O09.529 AMA (ADVANCED MATERNAL AGE) MULTIGRAVIDA 35+ (HCC): Status: RESOLVED | Noted: 2022-04-13 | Resolved: 2022-04-27

## 2022-04-27 PROBLEM — E66.813 CLASS 3 SEVERE OBESITY DUE TO EXCESS CALORIES WITHOUT SERIOUS COMORBIDITY WITH BODY MASS INDEX (BMI) OF 40.0 TO 44.9 IN ADULT: Status: RESOLVED | Noted: 2021-08-09 | Resolved: 2022-04-27

## 2022-04-27 PROBLEM — E66.01 CLASS 3 SEVERE OBESITY DUE TO EXCESS CALORIES WITHOUT SERIOUS COMORBIDITY WITH BODY MASS INDEX (BMI) OF 40.0 TO 44.9 IN ADULT (HCC): Status: RESOLVED | Noted: 2021-08-09 | Resolved: 2022-04-27

## 2022-04-27 PROBLEM — O99.019 ANTEPARTUM ANEMIA (HCC): Status: ACTIVE | Noted: 2022-04-27

## 2022-04-27 PROBLEM — O99.210 OBESITY COMPLICATING PREGNANCY: Status: ACTIVE | Noted: 2022-04-27

## 2022-04-27 PROBLEM — O09.521 MULTIGRAVIDA OF ADVANCED MATERNAL AGE IN FIRST TRIMESTER: Status: ACTIVE | Noted: 2022-04-27

## 2022-04-27 PROBLEM — O09.529 AMA (ADVANCED MATERNAL AGE) MULTIGRAVIDA 35+: Status: RESOLVED | Noted: 2022-04-13 | Resolved: 2022-04-27

## 2022-04-27 PROBLEM — E66.813 CLASS 3 SEVERE OBESITY DUE TO EXCESS CALORIES WITHOUT SERIOUS COMORBIDITY WITH BODY MASS INDEX (BMI) OF 40.0 TO 44.9 IN ADULT (HCC): Status: RESOLVED | Noted: 2021-08-09 | Resolved: 2022-04-27

## 2022-04-27 PROBLEM — O99.019 ANTEPARTUM ANEMIA: Status: ACTIVE | Noted: 2022-04-27

## 2022-04-27 PROBLEM — M72.2 PLANTAR FASCIITIS, BILATERAL: Status: RESOLVED | Noted: 2021-08-09 | Resolved: 2022-04-27

## 2022-04-27 PROBLEM — O99.210 OBESITY COMPLICATING PREGNANCY (HCC): Status: ACTIVE | Noted: 2022-04-27

## 2022-04-27 PROBLEM — O09.521 MULTIGRAVIDA OF ADVANCED MATERNAL AGE IN FIRST TRIMESTER (HCC): Status: ACTIVE | Noted: 2022-04-27

## 2022-04-27 PROBLEM — O99.210 OBESITY AFFECTING PREGNANCY, ANTEPARTUM (HCC): Status: RESOLVED | Noted: 2022-04-13 | Resolved: 2022-04-27

## 2022-04-27 LAB
APPEARANCE: CLEAR
GLUCOSE (URINE DIPSTICK): NEGATIVE MG/DL
MULTISTIX LOT#: NORMAL NUMERIC
NITRITE, URINE: NEGATIVE
URINE-COLOR: YELLOW

## 2022-04-27 PROCEDURE — 3079F DIAST BP 80-89 MM HG: CPT | Performed by: OBSTETRICS & GYNECOLOGY

## 2022-04-27 PROCEDURE — 76815 OB US LIMITED FETUS(S): CPT | Performed by: OBSTETRICS & GYNECOLOGY

## 2022-04-27 PROCEDURE — 81002 URINALYSIS NONAUTO W/O SCOPE: CPT | Performed by: OBSTETRICS & GYNECOLOGY

## 2022-04-27 PROCEDURE — 3074F SYST BP LT 130 MM HG: CPT | Performed by: OBSTETRICS & GYNECOLOGY

## 2022-04-27 NOTE — TELEPHONE ENCOUNTER
Dr. Ghulam Olson, please see [employer] message below. patient has consult appointment scheduled with you on 6/23/22.

## 2022-04-27 NOTE — PROGRESS NOTES
Neg /neg 12/20. Gc/Chl/Trich done. (+) cervical polyp. Start 1 1/2 baby aspirin daily. Needs iron. Doing one hour glucola this w/e. Undecided on FTS. Needs to see nephrologist for proteinurina.  BP log sent in weekly

## 2022-04-28 ENCOUNTER — TELEPHONE (OUTPATIENT)
Dept: OBGYN CLINIC | Facility: CLINIC | Age: 42
End: 2022-04-28

## 2022-04-28 LAB
C TRACH DNA SPEC QL NAA+PROBE: NEGATIVE
N GONORRHOEA DNA SPEC QL NAA+PROBE: NEGATIVE

## 2022-04-28 NOTE — TELEPHONE ENCOUNTER
I'd just monitor; if she had a pap and known cx polyp sounds like its from the exam yesterday. Of note, mom is blood type O+.

## 2022-04-28 NOTE — TELEPHONE ENCOUNTER
Pt called to c/o blood twice today when she wiped and in the toilet. Her BM had blood in it. Pt believes it is vaginally. Pt had a pap done yesterday. Denies cramping. Pt had her initial pn appt yesterday with OLIVIA, she had a pap and informed that she has an cervical polyp. Denies contractions. Denies urgency, frequency, painful urination, burning with urination or fever. Yesterday she had some discomfort with urination. Sent to Bess Cazares MD on Call, for recs.

## 2022-04-28 NOTE — TELEPHONE ENCOUNTER
Informed pt that [address] stated she should monitor, she had a pap and a know cx polyp. It could be from the exam from yesterday. Informed pt to call us if she starts with cramping, increase bleeding, painful urination, burning with urination, frequency, urgency, fever or lower back pain. Pt stated understanding.

## 2022-04-29 LAB — T VAGINALIS RRNA SPEC QL NAA+PROBE: NEGATIVE

## 2022-05-01 LAB — GLUCOSE, GESTATIONAL SCREEN (50G)-130 CUTOFF: 116 MG/DL

## 2022-05-10 ENCOUNTER — TELEPHONE (OUTPATIENT)
Dept: OBGYN CLINIC | Facility: CLINIC | Age: 42
End: 2022-05-10

## 2022-05-15 ENCOUNTER — PATIENT MESSAGE (OUTPATIENT)
Dept: OBGYN CLINIC | Facility: CLINIC | Age: 42
End: 2022-05-15

## 2022-05-16 NOTE — TELEPHONE ENCOUNTER
From: Sheba Henderson  To: Raissa Rodriguez MD  Sent: 5/15/2022 7:42 AM CDT  Subject: Blood pressure weekly readings    8-13

## 2022-05-20 ENCOUNTER — OFFICE VISIT (OUTPATIENT)
Dept: NEPHROLOGY | Facility: CLINIC | Age: 42
End: 2022-05-20
Payer: COMMERCIAL

## 2022-05-20 VITALS
HEIGHT: 64 IN | BODY MASS INDEX: 41.48 KG/M2 | WEIGHT: 243 LBS | SYSTOLIC BLOOD PRESSURE: 126 MMHG | DIASTOLIC BLOOD PRESSURE: 78 MMHG | HEART RATE: 93 BPM

## 2022-05-20 DIAGNOSIS — I10 ESSENTIAL HYPERTENSION: ICD-10-CM

## 2022-05-20 DIAGNOSIS — O12.11: Primary | ICD-10-CM

## 2022-05-20 PROCEDURE — 3078F DIAST BP <80 MM HG: CPT | Performed by: INTERNAL MEDICINE

## 2022-05-20 PROCEDURE — 3074F SYST BP LT 130 MM HG: CPT | Performed by: INTERNAL MEDICINE

## 2022-05-20 PROCEDURE — 99215 OFFICE O/P EST HI 40 MIN: CPT | Performed by: INTERNAL MEDICINE

## 2022-05-20 PROCEDURE — 3008F BODY MASS INDEX DOCD: CPT | Performed by: INTERNAL MEDICINE

## 2022-05-20 NOTE — PROGRESS NOTES
22        Patient: Wing Piper   YOB: 1980   Date of Visit: 2022       Dear  Dr. Skip Santacruz MD,      Thank you for referring Wing Piper to my practice. Please find my assessment and plan below. As you know she is a 70-year-old -American female  3 para 2 and a history of hypertension who I now had the pleasure of seeing for evaluation of mild proteinuria. Patient is currently 3 weeks pregnant. The patient just had a 24-hour urine for protein on 2022 and it was mildly elevated at 320 mg per 24 hours. Recent urinalysis showed trace protein but otherwise was unremarkable and her creatinine on 2022 was normal at 0.57. The patient's past medical history is significant for  3 para 2. Her last pregnancy was in . Both pregnancies were uncomplicated without preeclampsia. She apparently started to note borderline hypertension in . She was started on antihypertensive medications in 2021. Now recently switched to labetalol after previously being on nifedipine. Past medical history is otherwise unremarkable. Social history non-smoker. Family history strongly positive for hypertension. Her father also was diabetic ultimately ended up on dialysis for end-stage renal disease. Review of system the patient otherwise states she is doing well without any chest pain, shortness of breath, GI or urinary tract symptoms. Reports no edema. Monitoring her blood pressures regularly. 10 point review of systems is otherwise unremarkable. On physical exam her blood pressure is 126/78 with a pulse of 93 and she weighed 243 pounds. Her neck was supple without JVD. Lungs were clear. Heart revealed a regular rate and rhythm with an S4 but no gallops, murmurs or rubs. Abdomen was soft, flat, nontender without organomegaly, masses or bruits. Extremities revealed no edema.     I therefore informed the patient that she very mild proteinuria. This may be secondary to hypertension. Other causes need to be excluded. Reassured her that her renal function though otherwise is normal.  Blood pressures currently under good control. She does understand that given proteinuria and hypertension that there is an increased risk for preeclampsia during third trimester. For completion sake a repeat renal panel, sed rate, connective tissue profile, urine for microalbumin and renal ultrasound have been ordered. Further impressions and recommendations will be forthcoming after reviewing the above. Reinforced the importance of following a low-salt diet. Avoid nonsteroidals. She is checking her blood pressures daily and will let us know if they should increase significantly. Thank you very much for allowing me to participate in the care of your patient. If you have any questions please feel free to call.            Sincerely,   Sharla Duarte MD   [facility] , [address] [place] [address]  [address] [facility] [address]  [address] [facility] [address]    Document electronically generated by:  Sharla Duarte MD

## 2022-05-20 NOTE — PATIENT INSTRUCTIONS
Please do labs and kidney ultrasound as ordered. Continue to monitor your blood pressures regularly. Let us know if you think it is increasing significantly.

## 2022-05-23 ENCOUNTER — ROUTINE PRENATAL (OUTPATIENT)
Dept: OBGYN CLINIC | Facility: CLINIC | Age: 42
End: 2022-05-23
Payer: COMMERCIAL

## 2022-05-23 VITALS
DIASTOLIC BLOOD PRESSURE: 78 MMHG | BODY MASS INDEX: 41 KG/M2 | WEIGHT: 239.38 LBS | SYSTOLIC BLOOD PRESSURE: 114 MMHG | HEART RATE: 92 BPM

## 2022-05-23 DIAGNOSIS — Z34.91 ENCOUNTER FOR SUPERVISION OF NORMAL PREGNANCY IN FIRST TRIMESTER, UNSPECIFIED GRAVIDITY: Primary | ICD-10-CM

## 2022-05-23 DIAGNOSIS — O99.012 ANEMIA COMPLICATING PREGNANCY, SECOND TRIMESTER: ICD-10-CM

## 2022-05-23 LAB
APPEARANCE: CLEAR
BILIRUBIN: NEGATIVE
GLUCOSE (URINE DIPSTICK): NEGATIVE MG/DL
LEUKOCYTES: NEGATIVE
MULTISTIX LOT#: NORMAL NUMERIC
NITRITE, URINE: NEGATIVE
OCCULT BLOOD: NEGATIVE
PH, URINE: 7 (ref 4.5–8)
PROTEIN (URINE DIPSTICK): NEGATIVE MG/DL
SPECIFIC GRAVITY: 1.01 (ref 1–1.03)
URINE-COLOR: YELLOW
UROBILINOGEN,SEMI-QN: 0.2 MG/DL (ref 0–1.9)

## 2022-05-24 NOTE — PROGRESS NOTES
Daily nausea w/o emesis. Declined genetics. Saw Dr Kelly Espino and has lab order for further Nephrology w/u. I'll ad CBC follow up as well. She is using extra iron daily for past month. Has level 2 scheduled.

## 2022-05-29 LAB
HEMATOCRIT: 28.9 % (ref 35–45)
HEMOGLOBIN: 9.4 G/DL (ref 11.7–15.5)
MCH: 28.1 PG (ref 27–33)
MCHC: 32.5 G/DL (ref 32–36)
MCV: 86.5 FL (ref 80–100)
MPV: 11.2 FL (ref 7.5–12.5)
PLATELET COUNT: 337 THOUSAND/UL (ref 140–400)
RDW: 13.9 % (ref 11–15)
RED BLOOD CELL COUNT: 3.34 MILLION/UL (ref 3.8–5.1)
WHITE BLOOD CELL COUNT: 7.4 THOUSAND/UL (ref 3.8–10.8)

## 2022-06-01 LAB
ALBUMIN/GLOBULIN RATIO: 1.2 (CALC) (ref 1–2.5)
ALBUMIN: 3.6 G/DL (ref 3.6–5.1)
ALKALINE PHOSPHATASE: 84 U/L (ref 31–125)
ALT: 18 U/L (ref 6–29)
AST: 20 U/L (ref 10–30)
BILIRUBIN, TOTAL: 0.3 MG/DL (ref 0.2–1.2)
BUN: 11 MG/DL (ref 7–25)
C-REACTIVE PROTEIN: 44.3 MG/L
CALCIUM: 9.3 MG/DL (ref 8.6–10.2)
CARBON DIOXIDE: 24 MMOL/L (ref 20–32)
CHLORIDE: 104 MMOL/L (ref 98–110)
COMPLEMENT COMPONENT C3C: 169 MG/DL (ref 83–193)
COMPLEMENT COMPONENT C4C: 35 MG/DL (ref 15–57)
CREATININE, RANDOM URINE: 210 MG/DL (ref 20–275)
CREATININE: 0.58 MG/DL (ref 0.5–1.1)
EGFR IF AFRICN AM: 133 ML/MIN/1.73M2
EGFR IF NONAFRICN AM: 114 ML/MIN/1.73M2
GLOBULIN: 3 G/DL (CALC) (ref 1.9–3.7)
GLUCOSE: 78 MG/DL (ref 65–99)
MICROALBUMIN/CREATININE RATIO, RANDOM URINE: 4 MCG/MG CREAT
MICROALBUMIN: 0.9 MG/DL
POTASSIUM: 4.2 MMOL/L (ref 3.5–5.3)
PROTEIN, TOTAL: 6.6 G/DL (ref 6.1–8.1)
RHEUMATOID FACTOR: <14 IU/ML
SED RATE BY MODIFIED$WESTERGREN: 33 MM/H
SODIUM: 136 MMOL/L (ref 135–146)

## 2022-06-02 ENCOUNTER — TELEPHONE (OUTPATIENT)
Dept: NEPHROLOGY | Facility: CLINIC | Age: 42
End: 2022-06-02

## 2022-06-02 DIAGNOSIS — I10 ESSENTIAL HYPERTENSION: ICD-10-CM

## 2022-06-02 DIAGNOSIS — O12.11: Primary | ICD-10-CM

## 2022-06-02 NOTE — TELEPHONE ENCOUNTER
Kidney function remains normal.  Currently no protein in the urine. Her blood pressure still good. If so repeat a BMP, albumin, urine for microalbumin in 1 month at [facility].  Make standing order.

## 2022-06-05 ENCOUNTER — PATIENT MESSAGE (OUTPATIENT)
Dept: OBGYN CLINIC | Facility: CLINIC | Age: 42
End: 2022-06-05

## 2022-06-06 DIAGNOSIS — I10 ESSENTIAL HYPERTENSION: ICD-10-CM

## 2022-06-06 RX ORDER — LABETALOL 200 MG/1
200 TABLET, FILM COATED ORAL 2 TIMES DAILY
Qty: 60 TABLET | Refills: 2 | Status: SHIPPED | OUTPATIENT
Start: 2022-06-06 | End: 2022-09-09

## 2022-06-06 NOTE — TELEPHONE ENCOUNTER
From: Constanza Cnoteh  To: Kal Dawson MD  Sent: 6/5/2022 9:32 PM CDT  Subject: Blood Pressure Readings     5/29-6/4

## 2022-06-06 NOTE — TELEPHONE ENCOUNTER
Thanks for sending your log. Dr. Cristina Diaz reviewed your blood pressures. BP readings appear stable. Continue with labetalol 200 mg twice a day. Please send another log in 1 week.

## 2022-06-08 ENCOUNTER — PATIENT MESSAGE (OUTPATIENT)
Dept: OBGYN CLINIC | Facility: CLINIC | Age: 42
End: 2022-06-08

## 2022-06-13 ENCOUNTER — PATIENT MESSAGE (OUTPATIENT)
Dept: OBGYN CLINIC | Facility: CLINIC | Age: 42
End: 2022-06-13

## 2022-06-13 NOTE — TELEPHONE ENCOUNTER
From: Malena Mullins  To: Cassidy Buchanan MD  Sent: 6/13/2022 7:49 AM CDT  Subject: Blood Pressure Readings     6/5 - 6/11

## 2022-06-20 ENCOUNTER — PATIENT MESSAGE (OUTPATIENT)
Dept: OBGYN CLINIC | Facility: CLINIC | Age: 42
End: 2022-06-20

## 2022-06-21 ENCOUNTER — ROUTINE PRENATAL (OUTPATIENT)
Dept: OBGYN CLINIC | Facility: CLINIC | Age: 42
End: 2022-06-21
Payer: COMMERCIAL

## 2022-06-21 VITALS
DIASTOLIC BLOOD PRESSURE: 79 MMHG | WEIGHT: 245 LBS | HEART RATE: 99 BPM | SYSTOLIC BLOOD PRESSURE: 121 MMHG | BODY MASS INDEX: 42 KG/M2

## 2022-06-21 DIAGNOSIS — Z34.92 ENCOUNTER FOR SUPERVISION OF NORMAL PREGNANCY IN SECOND TRIMESTER, UNSPECIFIED GRAVIDITY: Primary | ICD-10-CM

## 2022-06-21 LAB
APPEARANCE: CLEAR
BILIRUBIN: NEGATIVE
GLUCOSE (URINE DIPSTICK): NEGATIVE MG/DL
KETONES (URINE DIPSTICK): NEGATIVE MG/DL
MULTISTIX LOT#: ABNORMAL NUMERIC
NITRITE, URINE: NEGATIVE
OCCULT BLOOD: NEGATIVE
PH, URINE: 6 (ref 4.5–8)
PROTEIN (URINE DIPSTICK): NEGATIVE MG/DL
SPECIFIC GRAVITY: 1.01 (ref 1–1.03)
URINE-COLOR: YELLOW
UROBILINOGEN,SEMI-QN: 0.2 MG/DL (ref 0–1.9)

## 2022-06-21 PROCEDURE — 81002 URINALYSIS NONAUTO W/O SCOPE: CPT | Performed by: OBSTETRICS & GYNECOLOGY

## 2022-06-21 PROCEDURE — 3074F SYST BP LT 130 MM HG: CPT | Performed by: OBSTETRICS & GYNECOLOGY

## 2022-06-21 PROCEDURE — 3078F DIAST BP <80 MM HG: CPT | Performed by: OBSTETRICS & GYNECOLOGY

## 2022-06-21 NOTE — TELEPHONE ENCOUNTER
From: Lane Talamantes  To: Karrie Hatch MD  Sent: 6/20/2022 7:19 PM CDT  Subject: Blood Pressure Readings     6/12-6-18

## 2022-06-21 NOTE — PROGRESS NOTES
No issuse. BP okay today. Has nephro labs scheduled for beginning of July . MFM scheduled. Pt hasn't done kidney US yet due to finances. Plans to perform in August.  RTC 4 wks.

## 2022-07-05 ENCOUNTER — PATIENT MESSAGE (OUTPATIENT)
Dept: OBGYN CLINIC | Facility: CLINIC | Age: 42
End: 2022-07-05

## 2022-07-05 ENCOUNTER — PATIENT MESSAGE (OUTPATIENT)
Dept: NEPHROLOGY | Facility: CLINIC | Age: 42
End: 2022-07-05

## 2022-07-05 DIAGNOSIS — I10 ESSENTIAL HYPERTENSION: Primary | ICD-10-CM

## 2022-07-06 NOTE — TELEPHONE ENCOUNTER
From: Obdulio Weeks  To: Richard Morneo MD  Sent: 7/5/2022 8:42 AM CDT  Subject: Blood Draw    Hi, I was supposed to have a blood draw on 7/2. I went to [facility] but they had no order.  Any blood work I do for insurance purposes will need to go through [employer]. If still required please send request.

## 2022-07-11 ENCOUNTER — HOSPITAL ENCOUNTER (OUTPATIENT)
Dept: PERINATAL CARE | Facility: HOSPITAL | Age: 42
End: 2022-07-11
Attending: OBSTETRICS & GYNECOLOGY
Payer: COMMERCIAL

## 2022-07-11 ENCOUNTER — PATIENT MESSAGE (OUTPATIENT)
Dept: NEPHROLOGY | Facility: CLINIC | Age: 42
End: 2022-07-11

## 2022-07-11 ENCOUNTER — HOSPITAL ENCOUNTER (OUTPATIENT)
Dept: PERINATAL CARE | Facility: HOSPITAL | Age: 42
Discharge: HOME | End: 2022-07-11
Attending: OBSTETRICS & GYNECOLOGY
Payer: COMMERCIAL

## 2022-07-11 ENCOUNTER — PATIENT MESSAGE (OUTPATIENT)
Dept: OBGYN CLINIC | Facility: CLINIC | Age: 42
End: 2022-07-11

## 2022-07-11 VITALS
SYSTOLIC BLOOD PRESSURE: 145 MMHG | HEART RATE: 93 BPM | WEIGHT: 247 LBS | DIASTOLIC BLOOD PRESSURE: 72 MMHG | BODY MASS INDEX: 42 KG/M2

## 2022-07-11 DIAGNOSIS — O99.212 OBESITY AFFECTING PREGNANCY IN SECOND TRIMESTER: ICD-10-CM

## 2022-07-11 DIAGNOSIS — O09.522 AMA (ADVANCED MATERNAL AGE) MULTIGRAVIDA 35+, SECOND TRIMESTER: ICD-10-CM

## 2022-07-11 DIAGNOSIS — O10.012 PRE-EXISTING ESSENTIAL HYPERTENSION DURING PREGNANCY IN SECOND TRIMESTER: ICD-10-CM

## 2022-07-11 DIAGNOSIS — O99.212 MATERNAL MORBID OBESITY IN SECOND TRIMESTER, ANTEPARTUM (HCC): ICD-10-CM

## 2022-07-11 DIAGNOSIS — O09.521 MULTIGRAVIDA OF ADVANCED MATERNAL AGE IN FIRST TRIMESTER: ICD-10-CM

## 2022-07-11 DIAGNOSIS — O12.11: ICD-10-CM

## 2022-07-11 DIAGNOSIS — E66.01 MATERNAL MORBID OBESITY IN SECOND TRIMESTER, ANTEPARTUM (HCC): ICD-10-CM

## 2022-07-11 DIAGNOSIS — O99.212 OBESITY AFFECTING PREGNANCY IN SECOND TRIMESTER: Primary | ICD-10-CM

## 2022-07-11 PROCEDURE — 76811 OB US DETAILED SNGL FETUS: CPT | Performed by: OBSTETRICS & GYNECOLOGY

## 2022-07-11 NOTE — TELEPHONE ENCOUNTER
From: Vielka Nguyen  To: Micheal Rice MD  Sent: 7/5/2022 8:42 AM CDT  Subject: Blood Draw    Hi, I was supposed to have a blood draw on 7/2. I went to [facility] but they had no order.  Any blood work I do for insurance purposes will need to go through [employer]. If still required please send request.

## 2022-07-11 NOTE — TELEPHONE ENCOUNTER
From: Markus Howard  To: Joay Vincent MD  Sent: 7/11/2022 8:45 AM CDT  Subject: Blood Pressure Readings     7/3- 7/9

## 2022-07-18 ENCOUNTER — PATIENT MESSAGE (OUTPATIENT)
Dept: OBGYN CLINIC | Facility: CLINIC | Age: 42
End: 2022-07-18

## 2022-07-18 ENCOUNTER — TELEPHONE (OUTPATIENT)
Dept: OBGYN CLINIC | Facility: CLINIC | Age: 42
End: 2022-07-18

## 2022-07-18 NOTE — TELEPHONE ENCOUNTER
21w6d. Pt states on Saturday she went to the bathroom. States she was constipated and was straining to have a BM. Pt states when she wiped there was a little bit of \"clotted blood\". She wiped a second time and there was a small amount if pink in the toilet paper. Pt as not had any further issues with spotting. Pt denies any cramping and does report some FM. Pt also believes she has IC 3 days prior to the episode. MBT is O+. Pt reassured and advised the spotting was most likely from straining with her BM. Pt instructed to monitor and call if spotting returns or if she develops any cramping. Pt advised she can take colace if she needs to, and to increase water and fiber in her diet to help with constipation. Message to CATERINA for further recs and sign off.

## 2022-07-18 NOTE — TELEPHONE ENCOUNTER
Pt is 21w6d and states over the weekend had one episode of spotting after wiping, pt doing well today no other symptoms.  Please advise

## 2022-07-20 NOTE — TELEPHONE ENCOUNTER
From: Elisa Reyna  To: Kaitlynn Linder MD  Sent: 7/18/2022 10:11 AM CDT  Subject: Blood Pressure Readings     7/10 - 7/16

## 2022-07-23 ENCOUNTER — ROUTINE PRENATAL (OUTPATIENT)
Dept: OBGYN CLINIC | Facility: CLINIC | Age: 42
End: 2022-07-23
Payer: COMMERCIAL

## 2022-07-23 VITALS
HEART RATE: 102 BPM | SYSTOLIC BLOOD PRESSURE: 124 MMHG | BODY MASS INDEX: 42 KG/M2 | DIASTOLIC BLOOD PRESSURE: 82 MMHG | WEIGHT: 247.19 LBS

## 2022-07-23 DIAGNOSIS — Z34.82 ENCOUNTER FOR SUPERVISION OF OTHER NORMAL PREGNANCY IN SECOND TRIMESTER: Primary | ICD-10-CM

## 2022-07-23 LAB
APPEARANCE: CLEAR
BILIRUBIN: NEGATIVE
GLUCOSE (URINE DIPSTICK): NEGATIVE MG/DL
KETONES (URINE DIPSTICK): NEGATIVE MG/DL
LEUKOCYTES: NEGATIVE
MULTISTIX LOT#: NORMAL NUMERIC
NITRITE, URINE: NEGATIVE
OCCULT BLOOD: NEGATIVE
PH, URINE: 7.5 (ref 4.5–8)
PROTEIN (URINE DIPSTICK): NEGATIVE MG/DL
SPECIFIC GRAVITY: 1.01 (ref 1–1.03)
URINE-COLOR: YELLOW
UROBILINOGEN,SEMI-QN: 0.2 MG/DL (ref 0–1.9)

## 2022-07-23 PROCEDURE — 3079F DIAST BP 80-89 MM HG: CPT | Performed by: OBSTETRICS & GYNECOLOGY

## 2022-07-23 PROCEDURE — 81002 URINALYSIS NONAUTO W/O SCOPE: CPT | Performed by: OBSTETRICS & GYNECOLOGY

## 2022-07-23 PROCEDURE — 3074F SYST BP LT 130 MM HG: CPT | Performed by: OBSTETRICS & GYNECOLOGY

## 2022-07-25 ENCOUNTER — PATIENT MESSAGE (OUTPATIENT)
Dept: OBGYN CLINIC | Facility: CLINIC | Age: 42
End: 2022-07-25

## 2022-07-26 NOTE — TELEPHONE ENCOUNTER
From: Carri Mayfield  To: Aj Gonzalez MD  Sent: 7/25/2022 8:09 PM CDT  Subject: Blood Pressure Readings     7/17 - 7/23

## 2022-07-29 ENCOUNTER — HOSPITAL ENCOUNTER (OUTPATIENT)
Dept: ULTRASOUND IMAGING | Age: 42
Discharge: HOME | End: 2022-07-29
Attending: INTERNAL MEDICINE
Payer: COMMERCIAL

## 2022-07-29 DIAGNOSIS — O12.11: ICD-10-CM

## 2022-07-29 DIAGNOSIS — I10 ESSENTIAL HYPERTENSION: ICD-10-CM

## 2022-07-29 PROCEDURE — 76770 US EXAM ABDO BACK WALL COMP: CPT | Performed by: INTERNAL MEDICINE

## 2022-07-31 ENCOUNTER — TELEPHONE (OUTPATIENT)
Dept: NEPHROLOGY | Facility: CLINIC | Age: 42
End: 2022-07-31

## 2022-07-31 DIAGNOSIS — I10 ESSENTIAL HYPERTENSION: Primary | ICD-10-CM

## 2022-07-31 NOTE — TELEPHONE ENCOUNTER
Kidney ultrasound was normal.  Make sure she does follow-up labs at [facility] this month. Orders in the computer though are incorrect. The urine should be a random urine for microalbumin. Not a 24-hour urine. How are blood pressure readings?

## 2022-08-01 NOTE — TELEPHONE ENCOUNTER
Informed patient of note below and verbalized understanding,Rn generated order for random urine microalbumin,for blood pressure reading [facility]

## 2022-08-06 ENCOUNTER — PATIENT MESSAGE (OUTPATIENT)
Dept: NEPHROLOGY | Facility: CLINIC | Age: 42
End: 2022-08-06

## 2022-08-07 ENCOUNTER — TELEPHONE (OUTPATIENT)
Dept: NEPHROLOGY | Facility: CLINIC | Age: 42
End: 2022-08-07

## 2022-08-07 ENCOUNTER — PATIENT MESSAGE (OUTPATIENT)
Dept: NEPHROLOGY | Facility: CLINIC | Age: 42
End: 2022-08-07

## 2022-08-07 LAB
ALBUMIN: 3.5 G/DL (ref 3.6–5.1)
BUN: 9 MG/DL (ref 7–25)
CALCIUM: 9.3 MG/DL (ref 8.6–10.2)
CARBON DIOXIDE: 22 MMOL/L (ref 20–32)
CHLORIDE: 105 MMOL/L (ref 98–110)
CREATININE: 0.58 MG/DL (ref 0.5–0.99)
EGFR: 117 ML/MIN/1.73M2
GLUCOSE, GESTATIONAL SCREEN (50G)-130 CUTOFF: 116 MG/DL
GLUCOSE: 119 MG/DL (ref 65–99)
HEMATOCRIT: 28.7 % (ref 35–45)
HEMOGLOBIN: 9.4 G/DL (ref 11.7–15.5)
MCH: 28.1 PG (ref 27–33)
MCHC: 32.8 G/DL (ref 32–36)
MCV: 85.7 FL (ref 80–100)
MPV: 11.2 FL (ref 7.5–12.5)
PLATELET COUNT: 321 THOUSAND/UL (ref 140–400)
POTASSIUM: 3.9 MMOL/L (ref 3.5–5.3)
RDW: 13.7 % (ref 11–15)
RED BLOOD CELL COUNT: 3.35 MILLION/UL (ref 3.8–5.1)
SODIUM: 136 MMOL/L (ref 135–146)
WHITE BLOOD CELL COUNT: 9.3 THOUSAND/UL (ref 3.8–10.8)

## 2022-08-08 ENCOUNTER — PATIENT MESSAGE (OUTPATIENT)
Dept: NEPHROLOGY | Facility: CLINIC | Age: 42
End: 2022-08-08

## 2022-08-08 NOTE — TELEPHONE ENCOUNTER
From: David Vigil  To: Ashlie Hernandez MD  Sent: 8/7/2022 3:59 PM CDT  Subject: Question regarding BASIC METABOLIC PANEL (8)    Hi,    I had my 1 hour Glucose test ( drinking the liquid drink) the same day as this blood draw. Could that have affected my Glucose reading of 119?

## 2022-08-08 NOTE — TELEPHONE ENCOUNTER
Rebekah Bui from [employer] called in to clarify the order please follow up fax # [phone] needs order changed from 24 hour urine to a random sample it is urgent

## 2022-08-08 NOTE — TELEPHONE ENCOUNTER
Informed patient of note below  And verbalized understanding stated it's a 24 hour urine ,advised pt the order is a random urine test.

## 2022-08-10 LAB
CREATININE, RANDOM URINE: 109 MG/DL (ref 20–275)
MICROALBUMIN/CREATININE RATIO, RANDOM URINE: 5 MCG/MG CREAT
MICROALBUMIN: 0.5 MG/DL

## 2022-08-11 ENCOUNTER — TELEPHONE (OUTPATIENT)
Dept: NEPHROLOGY | Facility: CLINIC | Age: 42
End: 2022-08-11

## 2022-08-11 NOTE — TELEPHONE ENCOUNTER
Dr Maximilian Middleton informed patient of note below and verbalized understanding ,copy of order mailed to pt . Patient will send BP readings thru [facility].

## 2022-08-11 NOTE — TELEPHONE ENCOUNTER
Kidney function remains normal no protein seen in the urine. Have her repeat labs in 1 month. Make sure she has copies of these 3 lab orders as I think she does them at [facility] out near [facility]. How are blood pressure readings?

## 2022-08-15 ENCOUNTER — PATIENT MESSAGE (OUTPATIENT)
Dept: OBGYN CLINIC | Facility: CLINIC | Age: 42
End: 2022-08-15

## 2022-08-16 NOTE — TELEPHONE ENCOUNTER
From: Cornel Barnes  To: Reyes Corn, MD  Sent: 8/15/2022 10:31 PM CDT  Subject: Blood Pressure Readings     Week of 8-7 Imiquimod Counseling:  I discussed with the patient the risks of imiquimod including but not limited to erythema, scaling, itching, weeping, crusting, and pain.  Patient understands that the inflammatory response to imiquimod is variable from person to person and was educated regarded proper titration schedule.  If flu-like symptoms develop, patient knows to discontinue the medication and contact us.

## 2022-08-17 ENCOUNTER — HOSPITAL ENCOUNTER (OUTPATIENT)
Dept: PERINATAL CARE | Facility: HOSPITAL | Age: 42
Discharge: HOME | End: 2022-08-17
Attending: OBSTETRICS & GYNECOLOGY
Payer: COMMERCIAL

## 2022-08-17 VITALS
HEART RATE: 94 BPM | SYSTOLIC BLOOD PRESSURE: 133 MMHG | DIASTOLIC BLOOD PRESSURE: 76 MMHG | WEIGHT: 251 LBS | BODY MASS INDEX: 43 KG/M2

## 2022-08-17 DIAGNOSIS — O10.013 PRE-EXISTING ESSENTIAL HYPERTENSION DURING PREGNANCY IN THIRD TRIMESTER: ICD-10-CM

## 2022-08-17 DIAGNOSIS — E66.01 MORBID OBESITY (HCC): ICD-10-CM

## 2022-08-17 DIAGNOSIS — O09.521 MULTIGRAVIDA OF ADVANCED MATERNAL AGE IN FIRST TRIMESTER: Primary | ICD-10-CM

## 2022-08-17 DIAGNOSIS — O99.213 OBESITY AFFECTING PREGNANCY IN THIRD TRIMESTER: ICD-10-CM

## 2022-08-17 DIAGNOSIS — O09.521 MULTIGRAVIDA OF ADVANCED MATERNAL AGE IN FIRST TRIMESTER: ICD-10-CM

## 2022-08-17 PROCEDURE — 76816 OB US FOLLOW-UP PER FETUS: CPT | Performed by: OBSTETRICS & GYNECOLOGY

## 2022-08-20 ENCOUNTER — ROUTINE PRENATAL (OUTPATIENT)
Dept: OBGYN CLINIC | Facility: CLINIC | Age: 42
End: 2022-08-20
Payer: COMMERCIAL

## 2022-08-20 ENCOUNTER — TELEPHONE (OUTPATIENT)
Dept: OBGYN CLINIC | Facility: CLINIC | Age: 42
End: 2022-08-20

## 2022-08-20 VITALS
WEIGHT: 250 LBS | BODY MASS INDEX: 43 KG/M2 | DIASTOLIC BLOOD PRESSURE: 74 MMHG | HEART RATE: 98 BPM | SYSTOLIC BLOOD PRESSURE: 115 MMHG

## 2022-08-20 DIAGNOSIS — O99.019 ANTEPARTUM ANEMIA: ICD-10-CM

## 2022-08-20 DIAGNOSIS — Z34.92 ENCOUNTER FOR SUPERVISION OF NORMAL PREGNANCY IN SECOND TRIMESTER, UNSPECIFIED GRAVIDITY: Primary | ICD-10-CM

## 2022-08-20 LAB
APPEARANCE: CLEAR
BILIRUBIN: NEGATIVE
GLUCOSE (URINE DIPSTICK): NEGATIVE MG/DL
KETONES (URINE DIPSTICK): NEGATIVE MG/DL
LEUKOCYTES: NEGATIVE
MULTISTIX LOT#: NORMAL NUMERIC
NITRITE, URINE: NEGATIVE
OCCULT BLOOD: NEGATIVE
PH, URINE: 7 (ref 4.5–8)
PROTEIN (URINE DIPSTICK): NEGATIVE MG/DL
SPECIFIC GRAVITY: 1 (ref 1–1.03)
URINE-COLOR: YELLOW
UROBILINOGEN,SEMI-QN: 0.2 MG/DL (ref 0–1.9)

## 2022-08-20 PROCEDURE — 3078F DIAST BP <80 MM HG: CPT | Performed by: OBSTETRICS & GYNECOLOGY

## 2022-08-20 PROCEDURE — 3074F SYST BP LT 130 MM HG: CPT | Performed by: OBSTETRICS & GYNECOLOGY

## 2022-08-20 PROCEDURE — 81002 URINALYSIS NONAUTO W/O SCOPE: CPT | Performed by: OBSTETRICS & GYNECOLOGY

## 2022-08-23 ENCOUNTER — PATIENT MESSAGE (OUTPATIENT)
Dept: OBGYN CLINIC | Facility: CLINIC | Age: 42
End: 2022-08-23

## 2022-08-24 NOTE — TELEPHONE ENCOUNTER
From: Nivia Garcia  To: Sheldon Celis MD  Sent: 8/23/2022 7:57 PM CDT  Subject: Blood Pressure Readings     Last week

## 2022-09-01 ENCOUNTER — TELEPHONE (OUTPATIENT)
Dept: OBGYN CLINIC | Facility: CLINIC | Age: 42
End: 2022-09-01

## 2022-09-01 DIAGNOSIS — Z20.822 SUSPECTED 2019 NOVEL CORONAVIRUS INFECTION: Primary | ICD-10-CM

## 2022-09-01 NOTE — TELEPHONE ENCOUNTER
28w2d. Pt states she developed a sore throat on 8/30 and developed a cough and congestion on 8/31. Pt has not done any covid testing. Pt advised a covid test will be ordered. Pt given phone number to schedule test.  Pt states she lives in [facility] and does not think she can make it out here for the testing before her appt on 9/3/2022. Pt advised that there are clinic locations near her. She can try to schedule appt out there. Pt advised she can also go to a pharmacy near hr home, but we would need to see the results. Pt informed if she cannot get the covid test done before 9/3, her appt may need to be rescheduled. Pt advised for symptoms she can take Sudafed, Tylenol cold (without added Tylenol), Halls,  Sucrets or Cepacol for the sore throat.

## 2022-09-02 ENCOUNTER — LAB ENCOUNTER (OUTPATIENT)
Dept: LAB | Age: 42
End: 2022-09-02
Attending: OBSTETRICS & GYNECOLOGY
Payer: COMMERCIAL

## 2022-09-02 DIAGNOSIS — Z20.822 SUSPECTED 2019 NOVEL CORONAVIRUS INFECTION: ICD-10-CM

## 2022-09-03 LAB — SARS-COV-2 RNA RESP QL NAA+PROBE: DETECTED

## 2022-09-05 ENCOUNTER — PATIENT MESSAGE (OUTPATIENT)
Dept: OBGYN CLINIC | Facility: CLINIC | Age: 42
End: 2022-09-05

## 2022-09-06 ENCOUNTER — TELEPHONE (OUTPATIENT)
Dept: OBGYN CLINIC | Facility: CLINIC | Age: 42
End: 2022-09-06

## 2022-09-06 PROBLEM — O98.513 COVID-19 AFFECTING PREGNANCY IN THIRD TRIMESTER: Status: ACTIVE | Noted: 2022-09-06

## 2022-09-06 PROBLEM — O98.513 COVID-19 AFFECTING PREGNANCY IN THIRD TRIMESTER (HCC): Status: ACTIVE | Noted: 2022-09-06

## 2022-09-06 PROBLEM — U07.1 COVID-19 AFFECTING PREGNANCY IN THIRD TRIMESTER (HCC): Status: ACTIVE | Noted: 2022-09-06

## 2022-09-06 PROBLEM — U07.1 COVID-19 AFFECTING PREGNANCY IN THIRD TRIMESTER: Status: ACTIVE | Noted: 2022-09-06

## 2022-09-06 RX ORDER — ENOXAPARIN SODIUM 100 MG/ML
40 INJECTION SUBCUTANEOUS DAILY
Qty: 28 EACH | Refills: 0 | Status: SHIPPED | OUTPATIENT
Start: 2022-09-06 | End: 2022-10-04

## 2022-09-06 NOTE — TELEPHONE ENCOUNTER
Pt has appt with [facility] on 9/17, quarantine ends on 9/13.  Pt offered sooner appts, would like to keep appt on 9/17

## 2022-09-06 NOTE — TELEPHONE ENCOUNTER
From: Petrona Love  To: Easton Gann MD  Sent: 9/5/2022 11:11 AM CDT  Subject: Covid test    Hi. I tested positive for Covid on 9/2. Do I have to have another test that shows negative before my next appointment? If so how can I schedule that?     Thank you

## 2022-09-06 NOTE — TELEPHONE ENCOUNTER
Pt was Rx'd Lovenox due to BMI and +COVID in pregnancy. Pt states she has anxiety thinking about giving herself the injections and is asking for alternative therapy, such as oral medication. Pt informed that there is no alternative therapy, oral is not considered safe in pregnancy. Discussed with pt that the injections come prefilled with thin, tiny needles. Discussed areas pt can self inject or if family is comfortable and inject in back of arms. Pt asking what could happen if she decides not to do the injections. Pt informed that with pregnancy she is at risk for blood clots and Covid also is known to cause blood clots so her risk has doubled. Pt informed she can develop a clot in her leg, lung, heart or brain. Pt informed any of these clots have the potential of causing fetal and maternal death Pt states understanding. Will attempt the injections, instructed to start tonight.

## 2022-09-06 NOTE — TELEPHONE ENCOUNTER
Patient has questions regarding lovenox which was recently prescribed because she is covid positive. Please advise.

## 2022-09-06 NOTE — TELEPHONE ENCOUNTER
Pt is 29w0d, tested + for Covid 9/2 therefore we cancelled her 9/3 PN visit with M[facility] Scott, would you like us to offer her a video visit this week or r/s her for after 10 day quarantine? Pt has a growth US 9/14 for AMA and BMI. + Covid added to problem list and Lovenox rx sent per protocol.

## 2022-09-09 DIAGNOSIS — I10 ESSENTIAL HYPERTENSION: ICD-10-CM

## 2022-09-09 RX ORDER — LABETALOL 200 MG/1
TABLET, FILM COATED ORAL
Qty: 180 TABLET | Refills: 0 | Status: SHIPPED | OUTPATIENT
Start: 2022-09-09

## 2022-09-11 ENCOUNTER — PATIENT MESSAGE (OUTPATIENT)
Dept: OBGYN CLINIC | Facility: CLINIC | Age: 42
End: 2022-09-11

## 2022-09-11 LAB
% SATURATION: 9 % (CALC) (ref 16–45)
ALBUMIN: 3.4 G/DL (ref 3.6–5.1)
BUN: 8 MG/DL (ref 7–25)
CALCIUM: 9.3 MG/DL (ref 8.6–10.2)
CARBON DIOXIDE: 23 MMOL/L (ref 20–32)
CHLORIDE: 106 MMOL/L (ref 98–110)
CREATININE, RANDOM URINE: 98 MG/DL (ref 20–275)
CREATININE: 0.63 MG/DL (ref 0.5–0.99)
EGFR: 114 ML/MIN/1.73M2
GLUCOSE: 80 MG/DL (ref 65–99)
IRON BINDING CAPACITY: 459 MCG/DL (CALC) (ref 250–450)
IRON, TOTAL: 42 MCG/DL (ref 40–190)
MICROALBUMIN/CREATININE RATIO, RANDOM URINE: 4 MCG/MG CREAT
MICROALBUMIN: 0.4 MG/DL
POTASSIUM: 4.1 MMOL/L (ref 3.5–5.3)
SODIUM: 137 MMOL/L (ref 135–146)

## 2022-09-12 NOTE — TELEPHONE ENCOUNTER
From: Cornel Barnes  To: Reyes Corn, MD  Sent: 9/11/2022 7:07 PM CDT  Subject: Blood Pressure Readings     Last week

## 2022-09-14 ENCOUNTER — HOSPITAL ENCOUNTER (OUTPATIENT)
Dept: PERINATAL CARE | Facility: HOSPITAL | Age: 42
Discharge: HOME | End: 2022-09-14
Attending: OBSTETRICS & GYNECOLOGY
Payer: COMMERCIAL

## 2022-09-14 ENCOUNTER — TELEPHONE (OUTPATIENT)
Dept: NEPHROLOGY | Facility: CLINIC | Age: 42
End: 2022-09-14

## 2022-09-14 VITALS
WEIGHT: 251 LBS | HEART RATE: 101 BPM | DIASTOLIC BLOOD PRESSURE: 74 MMHG | BODY MASS INDEX: 43 KG/M2 | SYSTOLIC BLOOD PRESSURE: 131 MMHG

## 2022-09-14 DIAGNOSIS — O99.213 OBESITY AFFECTING PREGNANCY IN THIRD TRIMESTER: ICD-10-CM

## 2022-09-14 DIAGNOSIS — I10 ESSENTIAL HYPERTENSION: ICD-10-CM

## 2022-09-14 DIAGNOSIS — O09.521 MULTIGRAVIDA OF ADVANCED MATERNAL AGE IN FIRST TRIMESTER: ICD-10-CM

## 2022-09-14 DIAGNOSIS — O09.521 MULTIGRAVIDA OF ADVANCED MATERNAL AGE IN FIRST TRIMESTER: Primary | ICD-10-CM

## 2022-09-14 PROCEDURE — 76816 OB US FOLLOW-UP PER FETUS: CPT | Performed by: OBSTETRICS & GYNECOLOGY

## 2022-09-14 NOTE — TELEPHONE ENCOUNTER
Informed patient of note below and verbalized understanding stated will send blood pressure readings thru My[facility].

## 2022-09-14 NOTE — TELEPHONE ENCOUNTER
Kidney function remains normal.  No significant protein in the urine. How are blood pressure readings? .  Repeat labs in 1 month.

## 2022-09-17 ENCOUNTER — ROUTINE PRENATAL (OUTPATIENT)
Dept: OBGYN CLINIC | Facility: CLINIC | Age: 42
End: 2022-09-17
Payer: COMMERCIAL

## 2022-09-17 VITALS
WEIGHT: 248 LBS | DIASTOLIC BLOOD PRESSURE: 80 MMHG | SYSTOLIC BLOOD PRESSURE: 118 MMHG | BODY MASS INDEX: 43 KG/M2 | HEART RATE: 102 BPM

## 2022-09-17 DIAGNOSIS — Z34.83 ENCOUNTER FOR SUPERVISION OF OTHER NORMAL PREGNANCY IN THIRD TRIMESTER: Primary | ICD-10-CM

## 2022-09-17 PROCEDURE — 3079F DIAST BP 80-89 MM HG: CPT | Performed by: OBSTETRICS & GYNECOLOGY

## 2022-09-17 PROCEDURE — 81002 URINALYSIS NONAUTO W/O SCOPE: CPT | Performed by: OBSTETRICS & GYNECOLOGY

## 2022-09-17 PROCEDURE — 3074F SYST BP LT 130 MM HG: CPT | Performed by: OBSTETRICS & GYNECOLOGY

## 2022-09-21 ENCOUNTER — PATIENT MESSAGE (OUTPATIENT)
Dept: OBGYN CLINIC | Facility: CLINIC | Age: 42
End: 2022-09-21

## 2022-09-22 NOTE — TELEPHONE ENCOUNTER
From: Erica Currie  To: Lloyd Sr MD  Sent: 9/21/2022 8:29 PM CDT  Subject: Blood pressure readings     Bp readings

## 2022-09-26 ENCOUNTER — OFFICE VISIT (OUTPATIENT)
Dept: FAMILY MEDICINE CLINIC | Facility: CLINIC | Age: 42
End: 2022-09-26

## 2022-09-26 VITALS
OXYGEN SATURATION: 98 % | HEART RATE: 84 BPM | HEIGHT: 64 IN | SYSTOLIC BLOOD PRESSURE: 122 MMHG | RESPIRATION RATE: 16 BRPM | DIASTOLIC BLOOD PRESSURE: 82 MMHG | WEIGHT: 250 LBS | BODY MASS INDEX: 42.68 KG/M2

## 2022-09-26 DIAGNOSIS — I10 ESSENTIAL HYPERTENSION: ICD-10-CM

## 2022-09-26 DIAGNOSIS — Z12.31 VISIT FOR SCREENING MAMMOGRAM: ICD-10-CM

## 2022-09-26 DIAGNOSIS — Z00.00 ROUTINE GENERAL MEDICAL EXAMINATION AT A HEALTH CARE FACILITY: Primary | ICD-10-CM

## 2022-09-26 PROCEDURE — 3008F BODY MASS INDEX DOCD: CPT | Performed by: PHYSICIAN ASSISTANT

## 2022-09-26 PROCEDURE — 3079F DIAST BP 80-89 MM HG: CPT | Performed by: PHYSICIAN ASSISTANT

## 2022-09-26 PROCEDURE — 3074F SYST BP LT 130 MM HG: CPT | Performed by: PHYSICIAN ASSISTANT

## 2022-09-26 PROCEDURE — 99396 PREV VISIT EST AGE 40-64: CPT | Performed by: PHYSICIAN ASSISTANT

## 2022-09-28 ENCOUNTER — HOSPITAL ENCOUNTER (OUTPATIENT)
Dept: PERINATAL CARE | Facility: HOSPITAL | Age: 42
Discharge: HOME | End: 2022-09-28
Attending: OBSTETRICS & GYNECOLOGY

## 2022-09-28 VITALS — HEART RATE: 84 BPM | DIASTOLIC BLOOD PRESSURE: 73 MMHG | SYSTOLIC BLOOD PRESSURE: 139 MMHG

## 2022-09-28 DIAGNOSIS — O99.213 OBESITY AFFECTING PREGNANCY IN THIRD TRIMESTER: Primary | ICD-10-CM

## 2022-09-28 DIAGNOSIS — O09.521 MULTIGRAVIDA OF ADVANCED MATERNAL AGE IN FIRST TRIMESTER: ICD-10-CM

## 2022-09-28 DIAGNOSIS — O10.013 PRE-EXISTING ESSENTIAL HYPERTENSION DURING PREGNANCY IN THIRD TRIMESTER: ICD-10-CM

## 2022-09-28 PROCEDURE — 59025 FETAL NON-STRESS TEST: CPT

## 2022-09-28 PROCEDURE — 59025 FETAL NON-STRESS TEST: CPT | Performed by: OBSTETRICS & GYNECOLOGY

## 2022-10-01 ENCOUNTER — TELEPHONE (OUTPATIENT)
Dept: OBGYN CLINIC | Facility: CLINIC | Age: 42
End: 2022-10-01

## 2022-10-01 ENCOUNTER — ROUTINE PRENATAL (OUTPATIENT)
Dept: OBGYN CLINIC | Facility: CLINIC | Age: 42
End: 2022-10-01
Payer: COMMERCIAL

## 2022-10-01 VITALS
BODY MASS INDEX: 43 KG/M2 | HEART RATE: 101 BPM | SYSTOLIC BLOOD PRESSURE: 102 MMHG | WEIGHT: 252.19 LBS | DIASTOLIC BLOOD PRESSURE: 69 MMHG

## 2022-10-01 DIAGNOSIS — Z34.91 ENCOUNTER FOR SUPERVISION OF NORMAL PREGNANCY IN FIRST TRIMESTER, UNSPECIFIED GRAVIDITY: Primary | ICD-10-CM

## 2022-10-01 LAB
APPEARANCE: CLEAR
BILIRUBIN: NEGATIVE
GLUCOSE (URINE DIPSTICK): NEGATIVE MG/DL
KETONES (URINE DIPSTICK): NEGATIVE MG/DL
MULTISTIX LOT#: ABNORMAL NUMERIC
NITRITE, URINE: NEGATIVE
OCCULT BLOOD: NEGATIVE
PH, URINE: 7 (ref 4.5–8)
SPECIFIC GRAVITY: 1.01 (ref 1–1.03)
URINE-COLOR: YELLOW
UROBILINOGEN,SEMI-QN: 0.2 MG/DL (ref 0–1.9)

## 2022-10-01 PROCEDURE — 3074F SYST BP LT 130 MM HG: CPT | Performed by: OBSTETRICS & GYNECOLOGY

## 2022-10-01 PROCEDURE — 3078F DIAST BP <80 MM HG: CPT | Performed by: OBSTETRICS & GYNECOLOGY

## 2022-10-01 PROCEDURE — 81002 URINALYSIS NONAUTO W/O SCOPE: CPT | Performed by: OBSTETRICS & GYNECOLOGY

## 2022-10-01 NOTE — TELEPHONE ENCOUNTER
Patient dropped off forms to be filled out for [employer]. Patient did not complete FCR and DENNIS. Patient also did not pay the $25 fee.

## 2022-10-03 NOTE — TELEPHONE ENCOUNTER
Disab and Fmla received in forms dept. Logged for processing. Sent [employer] message for forms fee. Auth signed with pt employer.

## 2022-10-04 ENCOUNTER — TELEPHONE (OUTPATIENT)
Dept: OBGYN CLINIC | Facility: CLINIC | Age: 42
End: 2022-10-04

## 2022-10-04 NOTE — TELEPHONE ENCOUNTER
Dr. Goldie Chiu,     Please sign off on 3 forms:  FMLA and 2 short term disability forms    -Highlight the patient and hit \"Chart\" button. -In Chart Review, w/in the Encounter tab - click 1 time on the Telephone call encounter for 10/1/22 Scroll down the telephone encounter.  -Click \"scan on\" blue Hyperlink under \"Media\" heading for FMLA Dr Goldie Chiu 10/4/22, Swetha Cruznd Dr Goldie Chiu 10/4/22,Disab 2 Dr Goldie Chiu 10/4/22  w/in the telephone enc.  -Click on Acknowledge button at the bottom right corner and left-click onto image, signature stamp appears and drag signature to Provider signature line. Stamp will turn blue. Close window.      Thank you,    Karl Hart

## 2022-10-05 ENCOUNTER — HOSPITAL ENCOUNTER (OUTPATIENT)
Dept: PERINATAL CARE | Facility: HOSPITAL | Age: 42
Discharge: HOME | End: 2022-10-05
Attending: OBSTETRICS & GYNECOLOGY
Payer: COMMERCIAL

## 2022-10-05 ENCOUNTER — NST DOCUMENTATION (OUTPATIENT)
Dept: OBGYN CLINIC | Facility: CLINIC | Age: 42
End: 2022-10-05

## 2022-10-05 DIAGNOSIS — O99.210 OBESITY COMPLICATING PREGNANCY: Primary | ICD-10-CM

## 2022-10-05 DIAGNOSIS — O09.521 MULTIGRAVIDA OF ADVANCED MATERNAL AGE IN FIRST TRIMESTER: ICD-10-CM

## 2022-10-05 PROCEDURE — 59025 FETAL NON-STRESS TEST: CPT

## 2022-10-05 PROCEDURE — 59025 FETAL NON-STRESS TEST: CPT | Performed by: OBSTETRICS & GYNECOLOGY

## 2022-10-05 NOTE — NST
Nonstress Test   Patient: Gabriella Nyhan    Gestation: 33w1d    Diagnosis from order:  High BMI       NST:         NST DOCUMENTATION 10/5/2022   Variability 6-25 BPM   Accelerations Yes   Decelerations None   Baseline 130   Uterine Irritability No   Contractions Not present   Acoustic Stimulator No   Nonstress Test Interpretation Reactive   NST Completed by Jessi Gonsales RN   Disposition Home    Testing Plan Weekly NST   Provider Notified Juan J Pride MD         I agree with the above evaluation. NST completed.   Aleksandar Boyle MD  10/5/2022  5:07 PM

## 2022-10-05 NOTE — TELEPHONE ENCOUNTER
FMLA and 2 disability forms have been completed and uploaded to pt's [employer] per requested

## 2022-10-08 ENCOUNTER — PATIENT MESSAGE (OUTPATIENT)
Dept: OBGYN CLINIC | Facility: CLINIC | Age: 42
End: 2022-10-08

## 2022-10-08 DIAGNOSIS — Z01.419 ENCOUNTER FOR GYNECOLOGICAL EXAMINATION: Primary | ICD-10-CM

## 2022-10-08 DIAGNOSIS — Z34.83 ENCOUNTER FOR SUPERVISION OF OTHER NORMAL PREGNANCY IN THIRD TRIMESTER: ICD-10-CM

## 2022-10-08 NOTE — TELEPHONE ENCOUNTER
From: Vielka Nguyen  To: Alysa Jones DO  Sent: 10/8/2022 9:55 AM CDT  Subject: Labs    Hi. I need the lab request to be sent to [facility] as I have to go through them for insurance purposes. I have an appointment for the 15th. So if the request can be sent by then that would be appreciated.

## 2022-10-12 ENCOUNTER — HOSPITAL ENCOUNTER (OUTPATIENT)
Dept: PERINATAL CARE | Facility: HOSPITAL | Age: 42
Discharge: HOME | End: 2022-10-12
Attending: OBSTETRICS & GYNECOLOGY
Payer: COMMERCIAL

## 2022-10-12 VITALS
BODY MASS INDEX: 43 KG/M2 | SYSTOLIC BLOOD PRESSURE: 127 MMHG | HEART RATE: 102 BPM | WEIGHT: 249 LBS | DIASTOLIC BLOOD PRESSURE: 76 MMHG

## 2022-10-12 DIAGNOSIS — O99.213 OBESITY AFFECTING PREGNANCY IN THIRD TRIMESTER: ICD-10-CM

## 2022-10-12 DIAGNOSIS — O09.521 MULTIGRAVIDA OF ADVANCED MATERNAL AGE IN FIRST TRIMESTER: ICD-10-CM

## 2022-10-12 DIAGNOSIS — O09.521 MULTIGRAVIDA OF ADVANCED MATERNAL AGE IN FIRST TRIMESTER: Primary | ICD-10-CM

## 2022-10-12 DIAGNOSIS — I10 ESSENTIAL HYPERTENSION: ICD-10-CM

## 2022-10-12 PROCEDURE — 76816 OB US FOLLOW-UP PER FETUS: CPT | Performed by: OBSTETRICS & GYNECOLOGY

## 2022-10-12 PROCEDURE — 76819 FETAL BIOPHYS PROFIL W/O NST: CPT

## 2022-10-16 ENCOUNTER — PATIENT MESSAGE (OUTPATIENT)
Dept: FAMILY MEDICINE CLINIC | Facility: CLINIC | Age: 42
End: 2022-10-16

## 2022-10-16 ENCOUNTER — PATIENT MESSAGE (OUTPATIENT)
Dept: OBGYN CLINIC | Facility: CLINIC | Age: 42
End: 2022-10-16

## 2022-10-16 PROBLEM — R79.89 ELEVATED LFTS: Status: ACTIVE | Noted: 2022-10-16

## 2022-10-16 LAB
ALBUMIN/GLOBULIN RATIO: 1.2 (CALC) (ref 1–2.5)
ALBUMIN: 3.3 G/DL (ref 3.6–5.1)
ALBUMIN: 3.4 G/DL (ref 3.6–5.1)
ALKALINE PHOSPHATASE: 118 U/L (ref 31–125)
ALT: 43 U/L (ref 6–29)
AST: 41 U/L (ref 10–30)
BILIRUBIN, DIRECT: 0 MG/DL
BILIRUBIN, INDIRECT: 0.3 MG/DL (CALC) (ref 0.2–1.2)
BILIRUBIN, TOTAL: 0.3 MG/DL (ref 0.2–1.2)
BUN: 11 MG/DL (ref 7–25)
CALCIUM: 9.3 MG/DL (ref 8.6–10.2)
CARBON DIOXIDE: 21 MMOL/L (ref 20–32)
CHLORIDE: 104 MMOL/L (ref 98–110)
CHOL/HDLC RATIO: 3.7 (CALC)
CHOLESTEROL, TOTAL: 187 MG/DL
CREATININE, RANDOM URINE: 136 MG/DL (ref 20–275)
CREATININE: 0.67 MG/DL (ref 0.5–0.99)
EGFR: 112 ML/MIN/1.73M2
GLOBULIN: 2.9 G/DL (CALC) (ref 1.9–3.7)
GLUCOSE: 82 MG/DL (ref 65–99)
HDL CHOLESTEROL: 50 MG/DL
LDL-CHOLESTEROL: 105 MG/DL (CALC)
MICROALBUMIN/CREATININE RATIO, RANDOM URINE: 7 MCG/MG CREAT
MICROALBUMIN: 0.9 MG/DL
NON-HDL CHOLESTEROL: 137 MG/DL (CALC)
POTASSIUM: 4.2 MMOL/L (ref 3.5–5.3)
PROTEIN, TOTAL: 6.3 G/DL (ref 6.1–8.1)
SODIUM: 135 MMOL/L (ref 135–146)
TRIGLYCERIDES: 200 MG/DL
TSH W/REFLEX TO FT4: 1.11 MIU/L
VITAMIN B12: 386 PG/ML (ref 200–1100)

## 2022-10-17 LAB
ABSOLUTE BASOPHILS: 33 CELLS/UL (ref 0–200)
ABSOLUTE EOSINOPHILS: 83 CELLS/UL (ref 15–500)
ABSOLUTE LYMPHOCYTES: 1668 CELLS/UL (ref 850–3900)
ABSOLUTE MONOCYTES: 855 CELLS/UL (ref 200–950)
ABSOLUTE NEUTROPHILS: 5661 CELLS/UL (ref 1500–7800)
BASOPHILS: 0.4 %
EOSINOPHILS: 1 %
HEMATOCRIT: 28.8 % (ref 35–45)
HEMOGLOBIN: 9.2 G/DL (ref 11.7–15.5)
LYMPHOCYTES: 20.1 %
MCH: 27.6 PG (ref 27–33)
MCHC: 31.9 G/DL (ref 32–36)
MCV: 86.5 FL (ref 80–100)
MONOCYTES: 10.3 %
MPV: 10.8 FL (ref 7.5–12.5)
NEUTROPHILS: 68.2 %
PLATELET COUNT: 314 THOUSAND/UL (ref 140–400)
RDW: 15 % (ref 11–15)
RED BLOOD CELL COUNT: 3.33 MILLION/UL (ref 3.8–5.1)
WHITE BLOOD CELL COUNT: 8.3 THOUSAND/UL (ref 3.8–10.8)

## 2022-10-17 NOTE — TELEPHONE ENCOUNTER
From: Diya Arroyo  To: Latesha Wasserman PA-C  Sent: 10/16/2022 6:09 PM CDT  Subject: Work form completion    Hello, my lab tests are available now. If you could complete my work form on file and fax it to the number provided on the form I would greatly appreciate it.  Thank you

## 2022-10-17 NOTE — TELEPHONE ENCOUNTER
BP log reviewed by AUDREY, no changes. Pt on Labetalol 200 mg BID.
From: Jose Carlos Gale  To: Iqra Sharma.  DO Robert  Sent: 10/16/2022 6:18 PM CDT  Subject: Blood pressure readings and primary care physician labs test comments     Attached
.

## 2022-10-18 ENCOUNTER — PATIENT MESSAGE (OUTPATIENT)
Dept: NEPHROLOGY | Facility: CLINIC | Age: 42
End: 2022-10-18

## 2022-10-18 ENCOUNTER — TELEPHONE (OUTPATIENT)
Dept: NEPHROLOGY | Facility: CLINIC | Age: 42
End: 2022-10-18

## 2022-10-18 NOTE — TELEPHONE ENCOUNTER
From: Trevor Vazquez  To: Agnieszka Gomez MD  Sent: 10/18/2022 2:22 PM CDT  Subject: Blood pressure readings     For October.

## 2022-10-18 NOTE — TELEPHONE ENCOUNTER
Dr Alma Crooks informed pt of note below and verbalized understanding ,stated she is doing fine ,delivery date will be on 11/22/22 but they will induced her a week before due date. Pt will send blood pressure readings thru [facility].

## 2022-10-18 NOTE — TELEPHONE ENCOUNTER
Kidney function remains normal.  No protein in the urine. How are blood pressure readings. If doing well just repeat labs in 1 month. When is her delivery date.

## 2022-10-19 ENCOUNTER — HOSPITAL ENCOUNTER (OUTPATIENT)
Dept: PERINATAL CARE | Facility: HOSPITAL | Age: 42
Discharge: HOME | End: 2022-10-19
Attending: OBSTETRICS & GYNECOLOGY
Payer: COMMERCIAL

## 2022-10-19 VITALS — DIASTOLIC BLOOD PRESSURE: 67 MMHG | SYSTOLIC BLOOD PRESSURE: 115 MMHG

## 2022-10-19 DIAGNOSIS — O09.521 MULTIGRAVIDA OF ADVANCED MATERNAL AGE IN FIRST TRIMESTER: ICD-10-CM

## 2022-10-19 DIAGNOSIS — O99.213 OBESITY AFFECTING PREGNANCY IN THIRD TRIMESTER: Primary | ICD-10-CM

## 2022-10-19 PROCEDURE — 59025 FETAL NON-STRESS TEST: CPT

## 2022-10-20 NOTE — ADDENDUM NOTE
Encounter addended by: Jose Alberto Bergeron RN on: 10/19/2022 8:34 PM   Actions taken: Flowsheet accepted

## 2022-10-21 ENCOUNTER — ROUTINE PRENATAL (OUTPATIENT)
Dept: OBGYN CLINIC | Facility: CLINIC | Age: 42
End: 2022-10-21
Payer: COMMERCIAL

## 2022-10-21 ENCOUNTER — TELEPHONE (OUTPATIENT)
Dept: OBGYN CLINIC | Facility: CLINIC | Age: 42
End: 2022-10-21

## 2022-10-21 VITALS
SYSTOLIC BLOOD PRESSURE: 106 MMHG | HEART RATE: 93 BPM | DIASTOLIC BLOOD PRESSURE: 73 MMHG | WEIGHT: 248.81 LBS | BODY MASS INDEX: 43 KG/M2

## 2022-10-21 DIAGNOSIS — O99.013 ANEMIA AFFECTING PREGNANCY IN THIRD TRIMESTER: Primary | ICD-10-CM

## 2022-10-21 DIAGNOSIS — O10.013 PRE-EXISTING ESSENTIAL HYPERTENSION DURING PREGNANCY IN THIRD TRIMESTER: ICD-10-CM

## 2022-10-21 DIAGNOSIS — O09.521 MULTIGRAVIDA OF ADVANCED MATERNAL AGE IN FIRST TRIMESTER: ICD-10-CM

## 2022-10-21 DIAGNOSIS — Z34.93 ENCOUNTER FOR SUPERVISION OF NORMAL PREGNANCY IN THIRD TRIMESTER, UNSPECIFIED GRAVIDITY: Primary | ICD-10-CM

## 2022-10-21 DIAGNOSIS — O99.213 OBESITY AFFECTING PREGNANCY IN THIRD TRIMESTER: ICD-10-CM

## 2022-10-21 DIAGNOSIS — Z3A.35 35 WEEKS GESTATION OF PREGNANCY: ICD-10-CM

## 2022-10-21 DIAGNOSIS — O12.11: ICD-10-CM

## 2022-10-21 LAB
BILIRUBIN: NEGATIVE
GLUCOSE (URINE DIPSTICK): NEGATIVE MG/DL
KETONES (URINE DIPSTICK): NEGATIVE MG/DL
MULTISTIX LOT#: ABNORMAL NUMERIC
NITRITE, URINE: NEGATIVE
OCCULT BLOOD: NEGATIVE
PH, URINE: 7.5 (ref 4.5–8)
PROTEIN (URINE DIPSTICK): NEGATIVE MG/DL
SPECIFIC GRAVITY: 1.01 (ref 1–1.03)
UROBILINOGEN,SEMI-QN: 0.2 MG/DL (ref 0–1.9)

## 2022-10-21 PROCEDURE — 81002 URINALYSIS NONAUTO W/O SCOPE: CPT | Performed by: OBSTETRICS & GYNECOLOGY

## 2022-10-21 PROCEDURE — 3074F SYST BP LT 130 MM HG: CPT | Performed by: OBSTETRICS & GYNECOLOGY

## 2022-10-21 PROCEDURE — 3078F DIAST BP <80 MM HG: CPT | Performed by: OBSTETRICS & GYNECOLOGY

## 2022-10-21 NOTE — TELEPHONE ENCOUNTER
----- Message from Lilo Jo DO sent at 10/20/2022  5:05 PM CDT -----  I called and discussed CBC result with [facility]. Her hgb is actually decreased despite BID iron plus Prenatal vitamin. I want her to be referred to Hematology. Unfortunately she is already 35 weeks so ask if they can accommodate. Thank you.

## 2022-10-24 ENCOUNTER — PATIENT MESSAGE (OUTPATIENT)
Dept: OBGYN CLINIC | Facility: CLINIC | Age: 42
End: 2022-10-24

## 2022-10-24 NOTE — TELEPHONE ENCOUNTER
From: Carri Mayfield  To: Aj Gonzalez MD  Sent: 10/24/2022 8:10 AM CDT  Subject: Blood Pressure Readings     Last week

## 2022-10-26 ENCOUNTER — APPOINTMENT (OUTPATIENT)
Dept: OBGYN CLINIC | Facility: HOSPITAL | Age: 42
End: 2022-10-26
Payer: COMMERCIAL

## 2022-10-26 ENCOUNTER — HOSPITAL ENCOUNTER (OUTPATIENT)
Facility: HOSPITAL | Age: 42
Discharge: HOME | End: 2022-10-26
Attending: OBSTETRICS & GYNECOLOGY | Admitting: OBSTETRICS & GYNECOLOGY
Payer: COMMERCIAL

## 2022-10-26 VITALS — DIASTOLIC BLOOD PRESSURE: 67 MMHG | HEART RATE: 92 BPM | SYSTOLIC BLOOD PRESSURE: 125 MMHG

## 2022-10-26 PROCEDURE — 59025 FETAL NON-STRESS TEST: CPT

## 2022-10-26 PROCEDURE — 59025 FETAL NON-STRESS TEST: CPT | Performed by: OBSTETRICS & GYNECOLOGY

## 2022-10-27 ENCOUNTER — OFFICE VISIT (OUTPATIENT)
Dept: HEMATOLOGY/ONCOLOGY | Facility: HOSPITAL | Age: 42
End: 2022-10-27
Attending: OBSTETRICS & GYNECOLOGY
Payer: COMMERCIAL

## 2022-10-27 VITALS
RESPIRATION RATE: 16 BRPM | TEMPERATURE: 99 F | OXYGEN SATURATION: 100 % | WEIGHT: 253 LBS | DIASTOLIC BLOOD PRESSURE: 70 MMHG | SYSTOLIC BLOOD PRESSURE: 120 MMHG | HEART RATE: 90 BPM | BODY MASS INDEX: 43.19 KG/M2 | HEIGHT: 64 IN

## 2022-10-27 DIAGNOSIS — Z3A.35 35 WEEKS GESTATION OF PREGNANCY: ICD-10-CM

## 2022-10-27 DIAGNOSIS — O99.013 ANEMIA AFFECTING PREGNANCY IN THIRD TRIMESTER: ICD-10-CM

## 2022-10-27 PROCEDURE — 99204 OFFICE O/P NEW MOD 45 MIN: CPT | Performed by: INTERNAL MEDICINE

## 2022-10-28 LAB
% SATURATION: 8 % (CALC) (ref 16–45)
ABSOLUTE BASOPHILS: 40 CELLS/UL (ref 0–200)
ABSOLUTE EOSINOPHILS: 72 CELLS/UL (ref 15–500)
ABSOLUTE LYMPHOCYTES: 1608 CELLS/UL (ref 850–3900)
ABSOLUTE MONOCYTES: 872 CELLS/UL (ref 200–950)
ABSOLUTE NEUTROPHILS: 5408 CELLS/UL (ref 1500–7800)
ALBUMIN/GLOBULIN RATIO: 1.1 (CALC) (ref 1–2.5)
ALBUMIN: 3.3 G/DL (ref 3.6–5.1)
ALKALINE PHOSPHATASE: 135 U/L (ref 31–125)
ALT: 90 U/L (ref 6–29)
AST: 86 U/L (ref 10–30)
BASOPHILS: 0.5 %
BILIRUBIN, TOTAL: 0.3 MG/DL (ref 0.2–1.2)
BUN: 11 MG/DL (ref 7–25)
CALCIUM: 9.7 MG/DL (ref 8.6–10.2)
CARBON DIOXIDE: 20 MMOL/L (ref 20–32)
CHLORIDE: 106 MMOL/L (ref 98–110)
CREATININE: 0.71 MG/DL (ref 0.5–0.99)
EGFR: 109 ML/MIN/1.73M2
EOSINOPHILS: 0.9 %
FERRITIN: 17 NG/ML (ref 16–232)
FOLATE, SERUM: >24 NG/ML
GLOBULIN: 3.1 G/DL (CALC) (ref 1.9–3.7)
GLUCOSE: 67 MG/DL (ref 65–99)
HAPTOGLOBIN: 171 MG/DL (ref 43–212)
HEMATOCRIT: 28.3 % (ref 35–45)
HEMOGLOBIN: 9.2 G/DL (ref 11.7–15.5)
IRON BINDING CAPACITY: 533 MCG/DL (CALC) (ref 250–450)
IRON, TOTAL: 44 MCG/DL (ref 40–190)
LD: 144 U/L (ref 100–200)
LYMPHOCYTES: 20.1 %
MCH: 28 PG (ref 27–33)
MCHC: 32.5 G/DL (ref 32–36)
MCV: 86 FL (ref 80–100)
MONOCYTES: 10.9 %
MPV: 11.2 FL (ref 7.5–12.5)
NEUTROPHILS: 67.6 %
PLATELET COUNT: 313 THOUSAND/UL (ref 140–400)
POTASSIUM: 4.4 MMOL/L (ref 3.5–5.3)
PROTEIN, TOTAL: 6.4 G/DL (ref 6.1–8.1)
RDW: 15 % (ref 11–15)
RED BLOOD CELL COUNT: 3.29 MILLION/UL (ref 3.8–5.1)
RETICULOCYTE COUNT,$AUTOMATED: 3 %
RETICULOCYTE, ABSOLUTE: ABNORMAL CELLS/UL (ref 20000–80000)
SODIUM: 136 MMOL/L (ref 135–146)
TSH W/REFLEX TO FT4: 1.28 MIU/L
VITAMIN B12: 394 PG/ML (ref 200–1100)
WHITE BLOOD CELL COUNT: 8 THOUSAND/UL (ref 3.8–10.8)

## 2022-11-01 ENCOUNTER — ROUTINE PRENATAL (OUTPATIENT)
Dept: OBGYN CLINIC | Facility: CLINIC | Age: 42
End: 2022-11-01
Payer: COMMERCIAL

## 2022-11-01 VITALS
SYSTOLIC BLOOD PRESSURE: 99 MMHG | WEIGHT: 252.38 LBS | BODY MASS INDEX: 43 KG/M2 | DIASTOLIC BLOOD PRESSURE: 69 MMHG | HEART RATE: 102 BPM

## 2022-11-01 DIAGNOSIS — Z34.93 ENCOUNTER FOR SUPERVISION OF NORMAL PREGNANCY IN THIRD TRIMESTER, UNSPECIFIED GRAVIDITY: Primary | ICD-10-CM

## 2022-11-01 LAB
GLUCOSE (URINE DIPSTICK): NEGATIVE MG/DL
KETONES (URINE DIPSTICK): NEGATIVE MG/DL
MULTISTIX LOT#: ABNORMAL NUMERIC
NITRITE, URINE: NEGATIVE
OCCULT BLOOD: NEGATIVE
PH, URINE: 6 (ref 4.5–8)
PROTEIN (URINE DIPSTICK): NEGATIVE MG/DL
SPECIFIC GRAVITY: 1.02 (ref 1–1.03)
UROBILINOGEN,SEMI-QN: 0.2 MG/DL (ref 0–1.9)

## 2022-11-01 PROCEDURE — 81002 URINALYSIS NONAUTO W/O SCOPE: CPT | Performed by: OBSTETRICS & GYNECOLOGY

## 2022-11-01 PROCEDURE — 3078F DIAST BP <80 MM HG: CPT | Performed by: OBSTETRICS & GYNECOLOGY

## 2022-11-01 PROCEDURE — 3074F SYST BP LT 130 MM HG: CPT | Performed by: OBSTETRICS & GYNECOLOGY

## 2022-11-02 ENCOUNTER — APPOINTMENT (OUTPATIENT)
Dept: OBGYN CLINIC | Facility: HOSPITAL | Age: 42
End: 2022-11-02
Payer: COMMERCIAL

## 2022-11-02 ENCOUNTER — HOSPITAL ENCOUNTER (OUTPATIENT)
Facility: HOSPITAL | Age: 42
Discharge: HOME | End: 2022-11-02
Attending: OBSTETRICS & GYNECOLOGY | Admitting: OBSTETRICS & GYNECOLOGY
Payer: COMMERCIAL

## 2022-11-02 VITALS — HEART RATE: 84 BPM | SYSTOLIC BLOOD PRESSURE: 125 MMHG | TEMPERATURE: 98 F | DIASTOLIC BLOOD PRESSURE: 70 MMHG

## 2022-11-02 PROCEDURE — 59025 FETAL NON-STRESS TEST: CPT

## 2022-11-02 NOTE — PROGRESS NOTES
Pt is a 43year old female admitted to TR1/TR1-A. Patient presents with:  Non-stress Test     Pt is [id number] 37w1d intra-uterine pregnancy. History obtained, consents signed. Oriented to room, staff, and plan of care.

## 2022-11-03 NOTE — PROGRESS NOTES
GBS cx done. Saw hematology -- working on po iron & diet. Needs to increase NST to 2x / week if past 38 wks. Prefers not being induced if not ready. Reviewed rationale. Last MFM note states between 38-40 wks.  RTC 1 wk

## 2022-11-05 ENCOUNTER — APPOINTMENT (OUTPATIENT)
Dept: OBGYN CLINIC | Facility: HOSPITAL | Age: 42
End: 2022-11-05
Payer: COMMERCIAL

## 2022-11-05 ENCOUNTER — HOSPITAL ENCOUNTER (OUTPATIENT)
Facility: HOSPITAL | Age: 42
Discharge: HOME | End: 2022-11-05
Attending: OBSTETRICS & GYNECOLOGY | Admitting: OBSTETRICS & GYNECOLOGY
Payer: COMMERCIAL

## 2022-11-05 VITALS
TEMPERATURE: 98 F | DIASTOLIC BLOOD PRESSURE: 73 MMHG | HEART RATE: 93 BPM | SYSTOLIC BLOOD PRESSURE: 129 MMHG | RESPIRATION RATE: 16 BRPM

## 2022-11-05 DIAGNOSIS — Z34.90 PREGNANCY: ICD-10-CM

## 2022-11-05 PROCEDURE — 59025 FETAL NON-STRESS TEST: CPT | Performed by: OBSTETRICS & GYNECOLOGY

## 2022-11-05 PROCEDURE — 59025 FETAL NON-STRESS TEST: CPT

## 2022-11-05 NOTE — PROGRESS NOTES
Pt is a 43year old female admitted to TR1/TR1-A. Patient presents with:  Non-stress Test: Chronic BP      Pt is  37w4d intra-uterine pregnancy. History obtained, consents signed. Oriented to room, staff, and plan of care.

## 2022-11-07 NOTE — TELEPHONE ENCOUNTER
Dr. Jae Staley reviewed your blood pressures. No changes are needed. Please send another log in 1 week.    Let us know what questions you have,

## 2022-11-09 ENCOUNTER — HOSPITAL ENCOUNTER (OUTPATIENT)
Dept: PERINATAL CARE | Facility: HOSPITAL | Age: 42
Discharge: HOME | End: 2022-11-09
Attending: OBSTETRICS & GYNECOLOGY
Payer: COMMERCIAL

## 2022-11-09 VITALS
BODY MASS INDEX: 43 KG/M2 | HEART RATE: 101 BPM | WEIGHT: 252 LBS | SYSTOLIC BLOOD PRESSURE: 114 MMHG | DIASTOLIC BLOOD PRESSURE: 71 MMHG

## 2022-11-09 DIAGNOSIS — O98.513 COVID-19 AFFECTING PREGNANCY IN THIRD TRIMESTER: ICD-10-CM

## 2022-11-09 DIAGNOSIS — O12.11: ICD-10-CM

## 2022-11-09 DIAGNOSIS — O10.013 PRE-EXISTING ESSENTIAL HYPERTENSION DURING PREGNANCY IN THIRD TRIMESTER: ICD-10-CM

## 2022-11-09 DIAGNOSIS — O99.213 OBESITY AFFECTING PREGNANCY IN THIRD TRIMESTER: ICD-10-CM

## 2022-11-09 DIAGNOSIS — U07.1 COVID-19 AFFECTING PREGNANCY IN THIRD TRIMESTER: ICD-10-CM

## 2022-11-09 DIAGNOSIS — O09.521 MULTIGRAVIDA OF ADVANCED MATERNAL AGE IN FIRST TRIMESTER: Primary | ICD-10-CM

## 2022-11-09 DIAGNOSIS — O09.521 MULTIGRAVIDA OF ADVANCED MATERNAL AGE IN FIRST TRIMESTER: ICD-10-CM

## 2022-11-09 PROCEDURE — 76819 FETAL BIOPHYS PROFIL W/O NST: CPT

## 2022-11-09 PROCEDURE — 76816 OB US FOLLOW-UP PER FETUS: CPT | Performed by: OBSTETRICS & GYNECOLOGY

## 2022-11-10 ENCOUNTER — ROUTINE PRENATAL (OUTPATIENT)
Dept: OBGYN CLINIC | Facility: CLINIC | Age: 42
End: 2022-11-10
Payer: COMMERCIAL

## 2022-11-10 ENCOUNTER — TELEPHONE (OUTPATIENT)
Dept: OBGYN CLINIC | Facility: CLINIC | Age: 42
End: 2022-11-10

## 2022-11-10 VITALS
WEIGHT: 253.19 LBS | SYSTOLIC BLOOD PRESSURE: 107 MMHG | BODY MASS INDEX: 43 KG/M2 | HEART RATE: 91 BPM | DIASTOLIC BLOOD PRESSURE: 75 MMHG

## 2022-11-10 DIAGNOSIS — R74.8 ABNORMAL LIVER ENZYMES: ICD-10-CM

## 2022-11-10 DIAGNOSIS — Z34.93 ENCOUNTER FOR SUPERVISION OF NORMAL PREGNANCY IN THIRD TRIMESTER, UNSPECIFIED GRAVIDITY: Primary | ICD-10-CM

## 2022-11-10 LAB
ALBUMIN/GLOBULIN RATIO: 1.2 (CALC) (ref 1–2.5)
ALBUMIN: 3.4 G/DL (ref 3.6–5.1)
ALKALINE PHOSPHATASE: 142 U/L (ref 31–125)
ALT: 109 U/L (ref 6–29)
APPEARANCE: CLEAR
AST: 99 U/L (ref 10–30)
BILIRUBIN, DIRECT: 0.1 MG/DL
BILIRUBIN, INDIRECT: 0.3 MG/DL (CALC) (ref 0.2–1.2)
BILIRUBIN, TOTAL: 0.4 MG/DL (ref 0.2–1.2)
BILIRUBIN: NEGATIVE
GLOBULIN: 2.9 G/DL (CALC) (ref 1.9–3.7)
GLUCOSE (URINE DIPSTICK): NEGATIVE MG/DL
HEMATOCRIT: 29 % (ref 35–45)
HEMOGLOBIN: 9.6 G/DL (ref 11.7–15.5)
KETONES (URINE DIPSTICK): NEGATIVE MG/DL
LEUKOCYTES: NEGATIVE
MCH: 28.1 PG (ref 27–33)
MCHC: 33.1 G/DL (ref 32–36)
MCV: 84.8 FL (ref 80–100)
MPV: 11.4 FL (ref 7.5–12.5)
MULTISTIX LOT#: NORMAL NUMERIC
NITRITE, URINE: NEGATIVE
OCCULT BLOOD: NEGATIVE
PH, URINE: 7 (ref 4.5–8)
PLATELET COUNT: 307 THOUSAND/UL (ref 140–400)
PROTEIN, TOTAL: 6.3 G/DL (ref 6.1–8.1)
RDW: 15.2 % (ref 11–15)
RED BLOOD CELL COUNT: 3.42 MILLION/UL (ref 3.8–5.1)
SPECIFIC GRAVITY: 1.01 (ref 1–1.03)
URINE-COLOR: YELLOW
UROBILINOGEN,SEMI-QN: 0.2 MG/DL (ref 0–1.9)
WHITE BLOOD CELL COUNT: 8.1 THOUSAND/UL (ref 3.8–10.8)

## 2022-11-10 PROCEDURE — 3074F SYST BP LT 130 MM HG: CPT | Performed by: OBSTETRICS & GYNECOLOGY

## 2022-11-10 PROCEDURE — 81002 URINALYSIS NONAUTO W/O SCOPE: CPT | Performed by: OBSTETRICS & GYNECOLOGY

## 2022-11-10 PROCEDURE — 3078F DIAST BP <80 MM HG: CPT | Performed by: OBSTETRICS & GYNECOLOGY

## 2022-11-10 NOTE — PROGRESS NOTES
IOL for Monday 11/14 6 pm. Doing NST this w/e. Saw MFM yesterday. Noted abnormal liver enzymes listed by PA under problem list. Review of all prior labs w/o any abnormal. Will order Stat today.

## 2022-11-10 NOTE — TELEPHONE ENCOUNTER
Esau Conte from [employer] ryder blood from pt, the electronic order states Stat, pt paperwork does not say Stat.  Esau Conte needs to verify

## 2022-11-11 ENCOUNTER — HOSPITAL ENCOUNTER (INPATIENT)
Facility: HOSPITAL | Age: 42
LOS: 3 days | Discharge: HOME | End: 2022-11-14
Attending: OBSTETRICS & GYNECOLOGY | Admitting: OBSTETRICS & GYNECOLOGY
Payer: COMMERCIAL

## 2022-11-11 ENCOUNTER — NST DOCUMENTATION (OUTPATIENT)
Dept: OBGYN CLINIC | Facility: CLINIC | Age: 42
End: 2022-11-11

## 2022-11-11 ENCOUNTER — APPOINTMENT (OUTPATIENT)
Dept: OBGYN CLINIC | Facility: HOSPITAL | Age: 42
End: 2022-11-11
Attending: OBSTETRICS & GYNECOLOGY
Payer: COMMERCIAL

## 2022-11-11 PROBLEM — R74.8 ELEVATED LIVER ENZYMES: Status: ACTIVE | Noted: 2022-11-11

## 2022-11-11 LAB
ALBUMIN SERPL-MCNC: 2.4 G/DL (ref 3.4–5)
ALBUMIN/GLOB SERPL: 0.6 {RATIO} (ref 1–2)
ALP LIVER SERPL-CCNC: 140 U/L
ALT SERPL-CCNC: 112 U/L
ANION GAP SERPL CALC-SCNC: 8 MMOL/L (ref 0–18)
ANTIBODY SCREEN: NEGATIVE
AST SERPL-CCNC: 95 U/L (ref 15–37)
BASOPHILS # BLD AUTO: 0.03 X10(3) UL (ref 0–0.2)
BASOPHILS NFR BLD AUTO: 0.4 %
BILIRUB SERPL-MCNC: 0.2 MG/DL (ref 0.1–2)
BUN BLD-MCNC: 11 MG/DL (ref 7–18)
BUN/CREAT SERPL: 15.3 (ref 10–20)
CALCIUM BLD-MCNC: 8.7 MG/DL (ref 8.5–10.1)
CHLORIDE SERPL-SCNC: 110 MMOL/L (ref 98–112)
CO2 SERPL-SCNC: 22 MMOL/L (ref 21–32)
CREAT BLD-MCNC: 0.72 MG/DL
CREAT UR-SCNC: 218 MG/DL
DEPRECATED RDW RBC AUTO: 52.8 FL (ref 35.1–46.3)
EOSINOPHIL # BLD AUTO: 0.09 X10(3) UL (ref 0–0.7)
EOSINOPHIL NFR BLD AUTO: 1.1 %
ERYTHROCYTE [DISTWIDTH] IN BLOOD BY AUTOMATED COUNT: 16.7 % (ref 11–15)
GFR SERPLBLD BASED ON 1.73 SQ M-ARVRAT: 107 ML/MIN/1.73M2 (ref 60–?)
GLOBULIN PLAS-MCNC: 3.8 G/DL (ref 2.8–4.4)
GLUCOSE BLD-MCNC: 93 MG/DL (ref 70–99)
HCT VFR BLD AUTO: 26.6 %
HGB BLD-MCNC: 8.6 G/DL
IMM GRANULOCYTES # BLD AUTO: 0.06 X10(3) UL (ref 0–1)
IMM GRANULOCYTES NFR BLD: 0.7 %
LYMPHOCYTES # BLD AUTO: 1.81 X10(3) UL (ref 1–4)
LYMPHOCYTES NFR BLD AUTO: 21.3 %
MCH RBC QN AUTO: 28.2 PG (ref 26–34)
MCHC RBC AUTO-ENTMCNC: 32.3 G/DL (ref 31–37)
MCV RBC AUTO: 87.2 FL
MONOCYTES # BLD AUTO: 1 X10(3) UL (ref 0.1–1)
MONOCYTES NFR BLD AUTO: 11.8 %
NEUTROPHILS # BLD AUTO: 5.5 X10 (3) UL (ref 1.5–7.7)
NEUTROPHILS # BLD AUTO: 5.5 X10(3) UL (ref 1.5–7.7)
NEUTROPHILS NFR BLD AUTO: 64.7 %
OSMOLALITY SERPL CALC.SUM OF ELEC: 289 MOSM/KG (ref 275–295)
PLATELET # BLD AUTO: 265 10(3)UL (ref 150–450)
POTASSIUM SERPL-SCNC: 3.7 MMOL/L (ref 3.5–5.1)
PROT SERPL-MCNC: 6.2 G/DL (ref 6.4–8.2)
PROT UR-MCNC: 37.1 MG/DL
PROT/CREAT UR-RTO: 0.17
RBC # BLD AUTO: 3.05 X10(6)UL
RH BLOOD TYPE: POSITIVE
SODIUM SERPL-SCNC: 140 MMOL/L (ref 136–145)
WBC # BLD AUTO: 8.5 X10(3) UL (ref 4–11)

## 2022-11-11 PROCEDURE — 3E0P7VZ INTRODUCTION OF HORMONE INTO FEMALE REPRODUCTIVE, VIA NATURAL OR ARTIFICIAL OPENING: ICD-10-PCS | Performed by: OBSTETRICS & GYNECOLOGY

## 2022-11-11 RX ORDER — DEXTROSE, SODIUM CHLORIDE, SODIUM LACTATE, POTASSIUM CHLORIDE, AND CALCIUM CHLORIDE 5; .6; .31; .03; .02 G/100ML; G/100ML; G/100ML; G/100ML; G/100ML
INJECTION, SOLUTION INTRAVENOUS CONTINUOUS
Status: DISCONTINUED | OUTPATIENT
Start: 2022-11-11 | End: 2022-11-13 | Stop reason: HOSPADM

## 2022-11-11 RX ORDER — IBUPROFEN 600 MG/1
600 TABLET ORAL EVERY 6 HOURS PRN
Status: DISCONTINUED | OUTPATIENT
Start: 2022-11-11 | End: 2022-11-13 | Stop reason: HOSPADM

## 2022-11-11 RX ORDER — TERBUTALINE SULFATE 1 MG/ML
0.25 INJECTION, SOLUTION SUBCUTANEOUS AS NEEDED
Status: DISCONTINUED | OUTPATIENT
Start: 2022-11-11 | End: 2022-11-13 | Stop reason: HOSPADM

## 2022-11-11 RX ORDER — TRISODIUM CITRATE DIHYDRATE AND CITRIC ACID MONOHYDRATE 500; 334 MG/5ML; MG/5ML
30 SOLUTION ORAL AS NEEDED
Status: DISCONTINUED | OUTPATIENT
Start: 2022-11-11 | End: 2022-11-13 | Stop reason: HOSPADM

## 2022-11-11 RX ORDER — SODIUM CHLORIDE, SODIUM LACTATE, POTASSIUM CHLORIDE, CALCIUM CHLORIDE 600; 310; 30; 20 MG/100ML; MG/100ML; MG/100ML; MG/100ML
INJECTION, SOLUTION INTRAVENOUS CONTINUOUS
Status: DISCONTINUED | OUTPATIENT
Start: 2022-11-11 | End: 2022-11-13 | Stop reason: HOSPADM

## 2022-11-11 RX ORDER — AMMONIA INHALANTS 0.04 G/.3ML
0.3 INHALANT RESPIRATORY (INHALATION) AS NEEDED
Status: DISCONTINUED | OUTPATIENT
Start: 2022-11-11 | End: 2022-11-13 | Stop reason: HOSPADM

## 2022-11-11 RX ORDER — ACETAMINOPHEN 500 MG
500 TABLET ORAL EVERY 6 HOURS PRN
Status: DISCONTINUED | OUTPATIENT
Start: 2022-11-11 | End: 2022-11-13 | Stop reason: HOSPADM

## 2022-11-11 RX ORDER — LIDOCAINE HYDROCHLORIDE 10 MG/ML
30 INJECTION, SOLUTION EPIDURAL; INFILTRATION; INTRACAUDAL; PERINEURAL ONCE
Status: COMPLETED | OUTPATIENT
Start: 2022-11-11 | End: 2022-11-13

## 2022-11-11 RX ORDER — ONDANSETRON 2 MG/ML
4 INJECTION INTRAMUSCULAR; INTRAVENOUS EVERY 6 HOURS PRN
Status: DISCONTINUED | OUTPATIENT
Start: 2022-11-11 | End: 2022-11-13 | Stop reason: HOSPADM

## 2022-11-11 RX ORDER — LABETALOL 200 MG/1
200 TABLET, FILM COATED ORAL EVERY 12 HOURS SCHEDULED
Status: DISCONTINUED | OUTPATIENT
Start: 2022-11-11 | End: 2022-11-13

## 2022-11-11 NOTE — NST
Nonstress Test   Patient: Morris Dakotah    Gestation: 38w3d    Diagnosis from order:   AMA       NST:         NST DOCUMENTATION 2022   Variability 6-25 BPM   Accelerations Yes   Decelerations None   Baseline 110   Uterine Irritability No   Contractions Irregular   Contraction Frequency x1   Acoustic Stimulator No   Nonstress Test Interpretation Reactive   Nonstress Test Second Interpretation Reactive   FHR Category Category I   Comments NST reactive. Provider notified. Patient d/c to home with fetal movment and recognizing labor education. Patient states understanding of education and all questions answered. NST Completed by Diya Shea RN   Disposition Home    Testing Plan Twice Weekly NST   Provider Notified Gabrielle Hollingsworth MD         I agree with the above evaluation. NST completed.   De Crandall DO  2022  4:15 PM

## 2022-11-11 NOTE — NST
Nonstress Test   Patient: Wing Piper    NST date 9/28/22    Diagnosis from order:   AMA       NST: 125/mod/R    Breanna Castellanos DO  11/11/2022  4:12 PM

## 2022-11-12 ENCOUNTER — NST DOCUMENTATION (OUTPATIENT)
Dept: OBGYN CLINIC | Facility: CLINIC | Age: 42
End: 2022-11-12

## 2022-11-12 PROCEDURE — 3E033VJ INTRODUCTION OF OTHER HORMONE INTO PERIPHERAL VEIN, PERCUTANEOUS APPROACH: ICD-10-PCS | Performed by: OBSTETRICS & GYNECOLOGY

## 2022-11-12 NOTE — PROCEDURES
Cooks catheter placed and both uterine and vaginal balloons filled with 80 mL sterile water. Both mom and baby tolerated well. Low dose pitocin to be started.

## 2022-11-12 NOTE — PLAN OF CARE
Problem: BIRTH - VAGINAL/ SECTION  Goal: Fetal and maternal status remain reassuring during the birth process  Description: INTERVENTIONS:  - Monitor vital signs  - Monitor fetal heart rate  - Monitor uterine activity  - Monitor labor progression (vaginal delivery)  - DVT prophylaxis (C/S delivery)  - Surgical antibiotic prophylaxis (C/S delivery)  Outcome: Progressing     Problem: PAIN - ADULT  Goal: Verbalizes/displays adequate comfort level or patient's stated pain goal  Description: INTERVENTIONS:  - Encourage pt to monitor pain and request assistance  - Assess pain using appropriate pain scale  - Administer analgesics based on type and severity of pain and evaluate response  - Implement non-pharmacological measures as appropriate and evaluate response  - Consider cultural and social influences on pain and pain management  - Manage/alleviate anxiety  - Utilize distraction and/or relaxation techniques  - Monitor for opioid side effects  - Notify MD/LIP if interventions unsuccessful or patient reports new pain  - Anticipate increased pain with activity and pre-medicate as appropriate  Outcome: Progressing     Problem: ANXIETY  Goal: Will report anxiety at manageable levels  Description: INTERVENTIONS:  - Administer medication as ordered  - Teach and rehearse alternative coping skills  - Provide emotional support with 1:1 interaction with staff  Outcome: Progressing     Problem: Patient Centered Care  Goal: Patient preferences are identified and integrated in the patient's plan of care  Description: Interventions:  - What would you like us to know as we care for you?   - Provide timely, complete, and accurate information to patient/family  - Incorporate patient and family knowledge, values, beliefs, and cultural backgrounds into the planning and delivery of care  - Encourage patient/family to participate in care and decision-making at the level they choose  - Honor patient and family perspectives and choices  Outcome: Progressing     Problem: Patient/Family Goals  Goal: Patient/Family Long Term Goal  Description: Patient's Long Term Goal:     Interventions:  -   - See additional Care Plan goals for specific interventions  Outcome: Progressing  Goal: Patient/Family Short Term Goal  Description: Patient's Short Term Goal:     Interventions:   -   - See additional Care Plan goals for specific interventions  Outcome: Progressing

## 2022-11-12 NOTE — PROGRESS NOTES
Pt is a 43year old female admitted to 373/373-A. Patient presents with:  Scheduled Induction: IOL elevated liver enzymes     Pt is  38w4d intra-uterine pregnancy. History obtained, consents signed. Oriented to room, staff, and plan of care.

## 2022-11-12 NOTE — PLAN OF CARE
Problem: BIRTH - VAGINAL/ SECTION  Goal: Fetal and maternal status remain reassuring during the birth process  Description: INTERVENTIONS:  - Monitor vital signs  - Monitor fetal heart rate  - Monitor uterine activity  - Monitor labor progression (vaginal delivery)  - DVT prophylaxis (C/S delivery)  - Surgical antibiotic prophylaxis (C/S delivery)  Outcome: Progressing     Problem: PAIN - ADULT  Goal: Verbalizes/displays adequate comfort level or patient's stated pain goal  Description: INTERVENTIONS:  - Encourage pt to monitor pain and request assistance  - Assess pain using appropriate pain scale  - Administer analgesics based on type and severity of pain and evaluate response  - Implement non-pharmacological measures as appropriate and evaluate response  - Consider cultural and social influences on pain and pain management  - Manage/alleviate anxiety  - Utilize distraction and/or relaxation techniques  - Monitor for opioid side effects  - Notify MD/LIP if interventions unsuccessful or patient reports new pain  - Anticipate increased pain with activity and pre-medicate as appropriate  Outcome: Progressing     Problem: ANXIETY  Goal: Will report anxiety at manageable levels  Description: INTERVENTIONS:  - Administer medication as ordered  - Teach and rehearse alternative coping skills  - Provide emotional support with 1:1 interaction with staff  Outcome: Progressing     Problem: Patient Centered Care  Goal: Patient preferences are identified and integrated in the patient's plan of care  Description: Interventions:  - What would you like us to know as we care for you?  Were having a girl!  - Provide timely, complete, and accurate information to patient/family  - Incorporate patient and family knowledge, values, beliefs, and cultural backgrounds into the planning and delivery of care  - Encourage patient/family to participate in care and decision-making at the level they choose  - Honor patient and family perspectives and choices  Outcome: Progressing     Problem: Patient/Family Goals  Goal: Patient/Family Long Term Goal  Description: Patient's Long Term Goal: Patient's Long Term Goal: Uncomplicated Delivery     Interventions:  - Assessment/Monitoring  - Induction/Augmentation per protocol and Provider order  - C/S per protocol and Provider order   - Education  - Intervention per protocol and Provider order with education   - Involve patient in POC  - See additional Care Plan goals for specific interventions   Outcome: Progressing  Goal: Patient/Family Short Term Goal  Description: Patient's Short Term Goal:Patient's Short Term Goal: Comfort and Pain Control     Interventions:   - Non Pharmacological pain intervention   - IV/IM and Epidural pain medication per Provider order and patient request  - Education  - Involve Patient in POC   - See additional Care Plan goals for specific interventions   Outcome: Progressing

## 2022-11-13 ENCOUNTER — ANESTHESIA (OUTPATIENT)
Dept: OBGYN UNIT | Facility: HOSPITAL | Age: 42
End: 2022-11-13
Payer: COMMERCIAL

## 2022-11-13 ENCOUNTER — ANESTHESIA EVENT (OUTPATIENT)
Dept: OBGYN UNIT | Facility: HOSPITAL | Age: 42
End: 2022-11-13
Payer: COMMERCIAL

## 2022-11-13 PROCEDURE — 10907ZC DRAINAGE OF AMNIOTIC FLUID, THERAPEUTIC FROM PRODUCTS OF CONCEPTION, VIA NATURAL OR ARTIFICIAL OPENING: ICD-10-PCS | Performed by: OBSTETRICS & GYNECOLOGY

## 2022-11-13 PROCEDURE — 10H07YZ INSERTION OF OTHER DEVICE INTO PRODUCTS OF CONCEPTION, VIA NATURAL OR ARTIFICIAL OPENING: ICD-10-PCS | Performed by: OBSTETRICS & GYNECOLOGY

## 2022-11-13 PROCEDURE — 0HQ9XZZ REPAIR PERINEUM SKIN, EXTERNAL APPROACH: ICD-10-PCS | Performed by: OBSTETRICS & GYNECOLOGY

## 2022-11-13 PROCEDURE — 59400 OBSTETRICAL CARE: CPT | Performed by: OBSTETRICS & GYNECOLOGY

## 2022-11-13 RX ORDER — BUPIVACAINE HYDROCHLORIDE 2.5 MG/ML
INJECTION, SOLUTION EPIDURAL; INFILTRATION; INTRACAUDAL
Status: COMPLETED | OUTPATIENT
Start: 2022-11-13 | End: 2022-11-13

## 2022-11-13 RX ORDER — BISACODYL 10 MG
10 SUPPOSITORY, RECTAL RECTAL ONCE AS NEEDED
Status: DISCONTINUED | OUTPATIENT
Start: 2022-11-13 | End: 2022-11-14

## 2022-11-13 RX ORDER — DOCUSATE SODIUM 100 MG/1
100 CAPSULE, LIQUID FILLED ORAL
Status: DISCONTINUED | OUTPATIENT
Start: 2022-11-13 | End: 2022-11-14

## 2022-11-13 RX ORDER — BUPIVACAINE HCL/0.9 % NACL/PF 0.25 %
5 PLASTIC BAG, INJECTION (ML) EPIDURAL AS NEEDED
Status: DISCONTINUED | OUTPATIENT
Start: 2022-11-13 | End: 2022-11-13

## 2022-11-13 RX ORDER — LIDOCAINE HYDROCHLORIDE 10 MG/ML
INJECTION, SOLUTION INFILTRATION; PERINEURAL
Status: COMPLETED | OUTPATIENT
Start: 2022-11-13 | End: 2022-11-13

## 2022-11-13 RX ORDER — DIAPER,BRIEF,INFANT-TODD,DISP
1 EACH MISCELLANEOUS EVERY 6 HOURS PRN
Status: DISCONTINUED | OUTPATIENT
Start: 2022-11-13 | End: 2022-11-14

## 2022-11-13 RX ORDER — BUPIVACAINE HYDROCHLORIDE 2.5 MG/ML
20 INJECTION, SOLUTION EPIDURAL; INFILTRATION; INTRACAUDAL ONCE
Status: DISCONTINUED | OUTPATIENT
Start: 2022-11-13 | End: 2022-11-13 | Stop reason: HOSPADM

## 2022-11-13 RX ORDER — IBUPROFEN 600 MG/1
600 TABLET ORAL EVERY 6 HOURS
Status: DISCONTINUED | OUTPATIENT
Start: 2022-11-13 | End: 2022-11-14

## 2022-11-13 RX ORDER — ONDANSETRON 2 MG/ML
4 INJECTION INTRAMUSCULAR; INTRAVENOUS EVERY 6 HOURS PRN
Status: DISCONTINUED | OUTPATIENT
Start: 2022-11-13 | End: 2022-11-14

## 2022-11-13 RX ORDER — ACETAMINOPHEN 500 MG
1000 TABLET ORAL EVERY 6 HOURS PRN
Status: DISCONTINUED | OUTPATIENT
Start: 2022-11-13 | End: 2022-11-14

## 2022-11-13 RX ORDER — SIMETHICONE 80 MG
80 TABLET,CHEWABLE ORAL 3 TIMES DAILY PRN
Status: DISCONTINUED | OUTPATIENT
Start: 2022-11-13 | End: 2022-11-14

## 2022-11-13 RX ORDER — LABETALOL 200 MG/1
200 TABLET, FILM COATED ORAL 2 TIMES DAILY
Status: DISCONTINUED | OUTPATIENT
Start: 2022-11-13 | End: 2022-11-14

## 2022-11-13 RX ORDER — NALBUPHINE HCL 10 MG/ML
2.5 AMPUL (ML) INJECTION
Status: DISCONTINUED | OUTPATIENT
Start: 2022-11-13 | End: 2022-11-13

## 2022-11-13 RX ORDER — LIDOCAINE HYDROCHLORIDE AND EPINEPHRINE 15; 5 MG/ML; UG/ML
INJECTION, SOLUTION EPIDURAL
Status: COMPLETED | OUTPATIENT
Start: 2022-11-13 | End: 2022-11-13

## 2022-11-13 RX ORDER — ACETAMINOPHEN 500 MG
500 TABLET ORAL EVERY 6 HOURS PRN
Status: DISCONTINUED | OUTPATIENT
Start: 2022-11-13 | End: 2022-11-14

## 2022-11-13 RX ORDER — AMMONIA INHALANTS 0.04 G/.3ML
0.3 INHALANT RESPIRATORY (INHALATION) AS NEEDED
Status: DISCONTINUED | OUTPATIENT
Start: 2022-11-13 | End: 2022-11-14

## 2022-11-13 RX ADMIN — BUPIVACAINE HYDROCHLORIDE 5 ML: 2.5 INJECTION, SOLUTION EPIDURAL; INFILTRATION; INTRACAUDAL at 03:50:00

## 2022-11-13 RX ADMIN — LIDOCAINE HYDROCHLORIDE 5 ML: 10 INJECTION, SOLUTION INFILTRATION; PERINEURAL at 03:50:00

## 2022-11-13 RX ADMIN — LIDOCAINE HYDROCHLORIDE AND EPINEPHRINE 5 ML: 15; 5 INJECTION, SOLUTION EPIDURAL at 03:50:00

## 2022-11-13 NOTE — NST
Nonstress Test   Patient: Benjy Rutledge    Gestation: 36w1d    Diagnosis from order:         NST:         NST DOCUMENTATION 2022   Variability 6-25 BPM   Accelerations Yes   Decelerations None   Baseline 125   Uterine Irritability No   Contractions Irregular   Contraction Frequency x1   Acoustic Stimulator No   Nonstress Test Interpretation Reactive   Nonstress Test Second Interpretation Reactive   FHR Category Category I   Comments    NST Completed by Esteban MENDEZ   Disposition Home    Testing Plan Twice Weekly NST   Provider Notified Norma Deng MD         I agree with the above evaluation. NST completed.   Althea Bynum MD  2022  8:45 PM

## 2022-11-13 NOTE — ANESTHESIA PROCEDURE NOTES
Labor Analgesia    Date/Time: 11/13/2022 3:50 AM  Performed by: Cirilo Woods MD  Authorized by: Cirilo Woods MD       General Information and Staff    Start Time:  11/13/2022 3:50 AM  End Time:  11/13/2022 3:59 AM  Anesthesiologist:  Cirilo Woods MD  Performed by:   Anesthesiologist  Patient Location:  OB  Site Identification: surface landmarks    Reason for Block: labor epidural    Preanesthetic Checklist: patient identified, IV checked, site marked, risks and benefits discussed, monitors and equipment checked, pre-op evaluation, timeout performed, anesthesia consent and sterile technique used      Procedure Details    Patient Position:  Sitting  Prep: ChloraPrep    Monitoring:  Heart rate  Approach:  Midline    Epidural Needle    Injection Technique:  JOSE air  Needle Type:  Tuohy  Needle Gauge:  18 G  Needle Length:  3.5 in  Location:  L2-3    Spinal Needle      Catheter    Catheter Type:  Multi-orifice  Catheter Size:  20 G  Catheter at Skin Depth:  14  Test Dose:  Negative    Assessment    Sensory Level:  T6    Additional Comments     Motor intact  good relief

## 2022-11-13 NOTE — PROGRESS NOTES
AROM performed with clear fluid. Cervix 10/100/0. Patient is resting comfortably with epidural. IUPC placed with clear fluid flashback. Pitocin to be restarted.

## 2022-11-13 NOTE — PROGRESS NOTES
Patient up to bathroom with assist x 2. Voided 500 ml. Patient transferred to mother/baby room 352 per wheelchair in stable condition with baby and personal belongings. Accompanied by significant other and staff. Report given to mother/baby RN.

## 2022-11-13 NOTE — L&D DELIVERY NOTE
[facility]    Vaginal Delivery Note    Chelle Singh Patient Status:  Inpatient    1980 MRN [id number]   Location [address] Attending Nolan Fang MD   [facility] Day # 2 PCP Agnes Maciel MD     Delivery     Infant  Date of Delivery: 2022   Time of Delivery: 11:47 AM  Delivery Type: Normal spontaneous vaginal delivery    Infant Sex  Information for the patient's : Ramon Neff Girl [[id number]]   female    Infant Birthweight  Information for the patient's : Jaden Franklin, Girl [[id number]]   6 lb 3.1 oz (2.81 kg)     Presentation Vertex [1]  Position Right [3] Occiput [1] Anterior [1]    Apgars:  1 minute: 8               5 minutes: 9                     Neonatologist Present: no      Placenta  Date/Time of Delivery: 2022 11:51 AM   Delivery: spontaneous  Placenta to Pathology: yes      Cord Gases Submitted: no  Cord Complications: single nuchal and body    Maternal Anesthesia: epidural     Episiotomy/Laceration Repair  First degree perineal repaired with 3-0 Vicryl    Delivery Complications  none    Quantitative Blood Loss (mL) 200      Delivery Comment:     Baby EDWINA. Shoulders delivered atraumatically followed by the trunk and LE and baby was placed on mothers abdomen. Nasopharyngeally bulb suctioned and infant vigorous. Cord clamped and cut after 60 seconds delay. Baby handed to the awaiting nursing personal. Placenta delivered by controlled cord traction intact with a 3 VC and intact. Normal uterine tone with oxytocin infusion. Repair as above with good hemostasis and anatomic re approximation. Sphincter intact. Rectum and vagina clear of sponges. [place] and patient stable in the delivery room with nursing present. Sponge and needle counts verified.         Yarely Gonzalez MD   2022  12:10 PM

## 2022-11-13 NOTE — ANESTHESIA POSTPROCEDURE EVALUATION
Patient: Chelle Singh    Procedure Summary     Date: 11/13/22 Room / Location:     Anesthesia Start: 9041 Anesthesia Stop: 9991    Procedure: LABOR ANALGESIA Diagnosis:     Scheduled Providers:  Anesthesiologist: Demian Munoz MD    Anesthesia Type: epidural ASA Status: 3          Anesthesia Type: epidural    Vitals Value Taken Time   /71 11/13/22 1326   Temp 98 11/13/22 1326   Pulse 67 11/13/22 1326   Resp 14 11/13/22 1326   SpO2 99 11/13/22 1326       [facility] Post Evaluation:   Patient Evaluated in floor  Patient Participation: complete - patient participated  Level of Consciousness: awake and alert  Pain Score: 0  Pain Management: adequate  Airway Patency:patent  Dental exam unchanged from preop  Yes    Cardiovascular Status: acceptable  Respiratory Status: acceptable  Postoperative Hydration acceptable      Kathryn Shepard MD  11/13/2022 1:26 PM

## 2022-11-14 ENCOUNTER — TELEPHONE (OUTPATIENT)
Dept: OBGYN CLINIC | Facility: CLINIC | Age: 42
End: 2022-11-14

## 2022-11-14 VITALS
DIASTOLIC BLOOD PRESSURE: 57 MMHG | TEMPERATURE: 98 F | RESPIRATION RATE: 16 BRPM | OXYGEN SATURATION: 100 % | BODY MASS INDEX: 43.19 KG/M2 | WEIGHT: 253 LBS | SYSTOLIC BLOOD PRESSURE: 114 MMHG | HEIGHT: 64 IN | HEART RATE: 91 BPM

## 2022-11-14 LAB
ALBUMIN SERPL-MCNC: 2.1 G/DL (ref 3.4–5)
ALBUMIN/GLOB SERPL: 0.6 {RATIO} (ref 1–2)
ALP LIVER SERPL-CCNC: 121 U/L
ALT SERPL-CCNC: 74 U/L
ANION GAP SERPL CALC-SCNC: 8 MMOL/L (ref 0–18)
AST SERPL-CCNC: 59 U/L (ref 15–37)
BASOPHILS # BLD AUTO: 0.03 X10(3) UL (ref 0–0.2)
BASOPHILS NFR BLD AUTO: 0.2 %
BILIRUB SERPL-MCNC: 0.3 MG/DL (ref 0.1–2)
BUN BLD-MCNC: 18 MG/DL (ref 7–18)
BUN/CREAT SERPL: 20.2 (ref 10–20)
CALCIUM BLD-MCNC: 8.8 MG/DL (ref 8.5–10.1)
CHLORIDE SERPL-SCNC: 112 MMOL/L (ref 98–112)
CO2 SERPL-SCNC: 22 MMOL/L (ref 21–32)
CREAT BLD-MCNC: 0.89 MG/DL
DEPRECATED RDW RBC AUTO: 54.8 FL (ref 35.1–46.3)
EOSINOPHIL # BLD AUTO: 0.05 X10(3) UL (ref 0–0.7)
EOSINOPHIL NFR BLD AUTO: 0.4 %
ERYTHROCYTE [DISTWIDTH] IN BLOOD BY AUTOMATED COUNT: 16.8 % (ref 11–15)
GFR SERPLBLD BASED ON 1.73 SQ M-ARVRAT: 83 ML/MIN/1.73M2 (ref 60–?)
GLOBULIN PLAS-MCNC: 3.5 G/DL (ref 2.8–4.4)
GLUCOSE BLD-MCNC: 73 MG/DL (ref 70–99)
HCT VFR BLD AUTO: 27 %
HGB BLD-MCNC: 8.6 G/DL
IMM GRANULOCYTES # BLD AUTO: 0.08 X10(3) UL (ref 0–1)
IMM GRANULOCYTES NFR BLD: 0.6 %
LYMPHOCYTES # BLD AUTO: 2.58 X10(3) UL (ref 1–4)
LYMPHOCYTES NFR BLD AUTO: 18.8 %
MCH RBC QN AUTO: 28.2 PG (ref 26–34)
MCHC RBC AUTO-ENTMCNC: 31.9 G/DL (ref 31–37)
MCV RBC AUTO: 88.5 FL
MONOCYTES # BLD AUTO: 1.36 X10(3) UL (ref 0.1–1)
MONOCYTES NFR BLD AUTO: 9.9 %
NEUTROPHILS # BLD AUTO: 9.63 X10 (3) UL (ref 1.5–7.7)
NEUTROPHILS # BLD AUTO: 9.63 X10(3) UL (ref 1.5–7.7)
NEUTROPHILS NFR BLD AUTO: 70.1 %
OSMOLALITY SERPL CALC.SUM OF ELEC: 294 MOSM/KG (ref 275–295)
PLATELET # BLD AUTO: 263 10(3)UL (ref 150–450)
POTASSIUM SERPL-SCNC: 3.7 MMOL/L (ref 3.5–5.1)
PROT SERPL-MCNC: 5.6 G/DL (ref 6.4–8.2)
RBC # BLD AUTO: 3.05 X10(6)UL
SODIUM SERPL-SCNC: 142 MMOL/L (ref 136–145)
WBC # BLD AUTO: 13.7 X10(3) UL (ref 4–11)

## 2022-11-14 RX ORDER — PSEUDOEPHEDRINE HCL 30 MG
100 TABLET ORAL 2 TIMES DAILY
Qty: 60 CAPSULE | Refills: 0 | Status: SHIPPED | OUTPATIENT
Start: 2022-11-14

## 2022-11-14 RX ORDER — IBUPROFEN 600 MG/1
600 TABLET ORAL EVERY 6 HOURS PRN
Qty: 30 TABLET | Refills: 0 | Status: SHIPPED | OUTPATIENT
Start: 2022-11-14 | End: 2022-11-24

## 2022-11-14 NOTE — TELEPHONE ENCOUNTER
PP- delivered 11/13/22. Per pt, ANJELICA directed pt to come to office for BP check with nurse toward the end of this week. She wants to coordinate with Peds appt, but awaiting call back on appt. Advise pt RN schedule is probably more flexible, so she can just call once she has Peds appt to coordinate. She will call us back.

## 2022-11-14 NOTE — PLAN OF CARE
Problem: BIRTH - VAGINAL/ SECTION  Goal: Fetal and maternal status remain reassuring during the birth process  Description: INTERVENTIONS:  - Monitor vital signs  - Monitor fetal heart rate  - Monitor uterine activity  - Monitor labor progression (vaginal delivery)  - DVT prophylaxis (C/S delivery)  - Surgical antibiotic prophylaxis (C/S delivery)  2022 by Dayo Robles RN  Outcome: Completed  2022 by Dayo Robles RN  Outcome: Progressing     Problem: PAIN - ADULT  Goal: Verbalizes/displays adequate comfort level or patient's stated pain goal  Description: INTERVENTIONS:  - Encourage pt to monitor pain and request assistance  - Assess pain using appropriate pain scale  - Administer analgesics based on type and severity of pain and evaluate response  - Implement non-pharmacological measures as appropriate and evaluate response  - Consider cultural and social influences on pain and pain management  - Manage/alleviate anxiety  - Utilize distraction and/or relaxation techniques  - Monitor for opioid side effects  - Notify MD/LIP if interventions unsuccessful or patient reports new pain  - Anticipate increased pain with activity and pre-medicate as appropriate  2022 by Dayo Robles RN  Outcome: Completed  2022 by Dayo Robles RN  Outcome: Progressing     Problem: ANXIETY  Goal: Will report anxiety at manageable levels  Description: INTERVENTIONS:  - Administer medication as ordered  - Teach and rehearse alternative coping skills  - Provide emotional support with 1:1 interaction with staff  2022 by Dayo Robles RN  Outcome: Completed  2022 by Dayo Robles RN  Outcome: Progressing     Problem: Patient Centered Care  Goal: Patient preferences are identified and integrated in the patient's plan of care  Description: Interventions:  - What would you like us to know as we care for you?   - Provide timely, complete, and accurate information to patient/family  - Incorporate patient and family knowledge, values, beliefs, and cultural backgrounds into the planning and delivery of care  - Encourage patient/family to participate in care and decision-making at the level they choose  - Honor patient and family perspectives and choices  11/14/2022 1546 by Tobias Deng RN  Outcome: Completed  11/14/2022 0930 by Tobias Degn RN  Outcome: Progressing     Problem: Patient/Family Goals  Goal: Patient/Family Long Term Goal  Description: Patient's Long Term Goal:     Interventions:  -   - See additional Care Plan goals for specific interventions  11/14/2022 1546 by Tobias Deng RN  Outcome: Completed  11/14/2022 0930 by Tobias Deng RN  Outcome: Progressing  Goal: Patient/Family Short Term Goal  Description: Patient's Short Term Goal:     Interventions:   -   - See additional Care Plan goals for specific interventions  11/14/2022 1546 by Tobias Deng RN  Outcome: Completed  11/14/2022 0930 by Tobias Deng RN  Outcome: Progressing

## 2022-11-14 NOTE — TELEPHONE ENCOUNTER
Please call to advise on appointment- Wednesday or Thursday, trying to schedule peds appointment too.

## 2022-11-14 NOTE — PLAN OF CARE
Problem: PAIN - ADULT  Goal: Verbalizes/displays adequate comfort level or patient's stated pain goal  Description: INTERVENTIONS:  - Encourage pt to monitor pain and request assistance  - Assess pain using appropriate pain scale  - Administer analgesics based on type and severity of pain and evaluate response  - Implement non-pharmacological measures as appropriate and evaluate response  - Consider cultural and social influences on pain and pain management  - Manage/alleviate anxiety  - Utilize distraction and/or relaxation techniques  - Monitor for opioid side effects  - Notify MD/LIP if interventions unsuccessful or patient reports new pain  - Anticipate increased pain with activity and pre-medicate as appropriate  Outcome: Progressing     Problem: ANXIETY  Goal: Will report anxiety at manageable levels  Description: INTERVENTIONS:  - Administer medication as ordered  - Teach and rehearse alternative coping skills  - Provide emotional support with 1:1 interaction with staff  Outcome: Progressing

## 2022-11-16 ENCOUNTER — HOSPITAL ENCOUNTER (INPATIENT)
Facility: HOSPITAL | Age: 42
LOS: 2 days | Discharge: HOME | End: 2022-11-18
Attending: OBSTETRICS & GYNECOLOGY | Admitting: OBSTETRICS & GYNECOLOGY
Payer: COMMERCIAL

## 2022-11-16 ENCOUNTER — NURSE ONLY (OUTPATIENT)
Dept: OBGYN CLINIC | Facility: CLINIC | Age: 42
End: 2022-11-16
Payer: COMMERCIAL

## 2022-11-16 ENCOUNTER — ANESTHESIA (OUTPATIENT)
Dept: OBGYN UNIT | Facility: HOSPITAL | Age: 42
End: 2022-11-16
Payer: COMMERCIAL

## 2022-11-16 ENCOUNTER — LAB ENCOUNTER (OUTPATIENT)
Dept: LAB | Facility: HOSPITAL | Age: 42
End: 2022-11-16
Attending: OBSTETRICS & GYNECOLOGY
Payer: COMMERCIAL

## 2022-11-16 ENCOUNTER — TELEPHONE (OUTPATIENT)
Dept: OBGYN CLINIC | Facility: CLINIC | Age: 42
End: 2022-11-16

## 2022-11-16 ENCOUNTER — ANESTHESIA EVENT (OUTPATIENT)
Dept: OBGYN UNIT | Facility: HOSPITAL | Age: 42
End: 2022-11-16
Payer: COMMERCIAL

## 2022-11-16 VITALS — DIASTOLIC BLOOD PRESSURE: 80 MMHG | HEART RATE: 61 BPM | SYSTOLIC BLOOD PRESSURE: 142 MMHG

## 2022-11-16 DIAGNOSIS — R03.0 ELEVATED BLOOD PRESSURE READING: Primary | ICD-10-CM

## 2022-11-16 PROBLEM — O14.90 PREECLAMPSIA (HCC): Status: ACTIVE | Noted: 2022-11-16

## 2022-11-16 PROBLEM — O14.90 PREECLAMPSIA: Status: ACTIVE | Noted: 2022-11-16

## 2022-11-16 LAB
ALBUMIN SERPL-MCNC: 2.7 G/DL (ref 3.4–5)
ALBUMIN/GLOB SERPL: 0.7 {RATIO} (ref 1–2)
ALP LIVER SERPL-CCNC: 122 U/L
ALT SERPL-CCNC: 103 U/L
ANION GAP SERPL CALC-SCNC: 8 MMOL/L (ref 0–18)
AST SERPL-CCNC: 107 U/L (ref 15–37)
BASOPHILS # BLD AUTO: 0.04 X10(3) UL (ref 0–0.2)
BASOPHILS NFR BLD AUTO: 0.5 %
BILIRUB SERPL-MCNC: 0.4 MG/DL (ref 0.1–2)
BUN BLD-MCNC: 11 MG/DL (ref 7–18)
BUN/CREAT SERPL: 14.1 (ref 10–20)
CALCIUM BLD-MCNC: 9.4 MG/DL (ref 8.5–10.1)
CHLORIDE SERPL-SCNC: 108 MMOL/L (ref 98–112)
CO2 SERPL-SCNC: 26 MMOL/L (ref 21–32)
CREAT BLD-MCNC: 0.78 MG/DL
CREAT UR-SCNC: 58.6 MG/DL
DEPRECATED RDW RBC AUTO: 54.2 FL (ref 35.1–46.3)
EOSINOPHIL # BLD AUTO: 0.08 X10(3) UL (ref 0–0.7)
EOSINOPHIL NFR BLD AUTO: 1 %
ERYTHROCYTE [DISTWIDTH] IN BLOOD BY AUTOMATED COUNT: 16.8 % (ref 11–15)
FASTING STATUS PATIENT QL REPORTED: YES
GFR SERPLBLD BASED ON 1.73 SQ M-ARVRAT: 97 ML/MIN/1.73M2 (ref 60–?)
GLOBULIN PLAS-MCNC: 3.8 G/DL (ref 2.8–4.4)
GLUCOSE BLD-MCNC: 72 MG/DL (ref 70–99)
HCT VFR BLD AUTO: 29.8 %
HGB BLD-MCNC: 9.3 G/DL
IMM GRANULOCYTES # BLD AUTO: 0.05 X10(3) UL (ref 0–1)
IMM GRANULOCYTES NFR BLD: 0.6 %
LYMPHOCYTES # BLD AUTO: 2.05 X10(3) UL (ref 1–4)
LYMPHOCYTES NFR BLD AUTO: 25.9 %
MCH RBC QN AUTO: 27.4 PG (ref 26–34)
MCHC RBC AUTO-ENTMCNC: 31.2 G/DL (ref 31–37)
MCV RBC AUTO: 87.9 FL
MONOCYTES # BLD AUTO: 0.8 X10(3) UL (ref 0.1–1)
MONOCYTES NFR BLD AUTO: 10.1 %
NEUTROPHILS # BLD AUTO: 4.88 X10 (3) UL (ref 1.5–7.7)
NEUTROPHILS # BLD AUTO: 4.88 X10(3) UL (ref 1.5–7.7)
NEUTROPHILS NFR BLD AUTO: 61.9 %
OSMOLALITY SERPL CALC.SUM OF ELEC: 292 MOSM/KG (ref 275–295)
PLATELET # BLD AUTO: 296 10(3)UL (ref 150–450)
POTASSIUM SERPL-SCNC: 3.6 MMOL/L (ref 3.5–5.1)
PROT SERPL-MCNC: 6.5 G/DL (ref 6.4–8.2)
PROT UR-MCNC: 8.4 MG/DL
PROT/CREAT UR-RTO: 0.14
RBC # BLD AUTO: 3.39 X10(6)UL
SODIUM SERPL-SCNC: 142 MMOL/L (ref 136–145)
WBC # BLD AUTO: 7.9 X10(3) UL (ref 4–11)

## 2022-11-16 PROCEDURE — 3077F SYST BP >= 140 MM HG: CPT | Performed by: OBSTETRICS & GYNECOLOGY

## 2022-11-16 PROCEDURE — 3079F DIAST BP 80-89 MM HG: CPT | Performed by: OBSTETRICS & GYNECOLOGY

## 2022-11-16 PROCEDURE — 85025 COMPLETE CBC W/AUTO DIFF WBC: CPT | Performed by: OBSTETRICS & GYNECOLOGY

## 2022-11-16 PROCEDURE — 36415 COLL VENOUS BLD VENIPUNCTURE: CPT | Performed by: OBSTETRICS & GYNECOLOGY

## 2022-11-16 PROCEDURE — 80053 COMPREHEN METABOLIC PANEL: CPT | Performed by: OBSTETRICS & GYNECOLOGY

## 2022-11-16 RX ORDER — LABETALOL 200 MG/1
200 TABLET, FILM COATED ORAL EVERY 12 HOURS SCHEDULED
Status: DISCONTINUED | OUTPATIENT
Start: 2022-11-16 | End: 2022-11-17

## 2022-11-16 RX ORDER — IBUPROFEN 600 MG/1
600 TABLET ORAL EVERY 6 HOURS PRN
Status: DISCONTINUED | OUTPATIENT
Start: 2022-11-16 | End: 2022-11-18

## 2022-11-16 RX ORDER — HYDRALAZINE HYDROCHLORIDE 20 MG/ML
5 INJECTION INTRAMUSCULAR; INTRAVENOUS ONCE AS NEEDED
Status: COMPLETED | OUTPATIENT
Start: 2022-11-16 | End: 2022-11-16

## 2022-11-16 RX ORDER — CALCIUM GLUCONATE 94 MG/ML
1 INJECTION, SOLUTION INTRAVENOUS ONCE AS NEEDED
Status: DISCONTINUED | OUTPATIENT
Start: 2022-11-16 | End: 2022-11-18

## 2022-11-16 RX ORDER — SODIUM CHLORIDE, SODIUM LACTATE, POTASSIUM CHLORIDE, CALCIUM CHLORIDE 600; 310; 30; 20 MG/100ML; MG/100ML; MG/100ML; MG/100ML
INJECTION, SOLUTION INTRAVENOUS CONTINUOUS
Status: DISCONTINUED | OUTPATIENT
Start: 2022-11-16 | End: 2022-11-18

## 2022-11-16 RX ORDER — HYDRALAZINE HYDROCHLORIDE 20 MG/ML
10 INJECTION INTRAMUSCULAR; INTRAVENOUS ONCE AS NEEDED
Status: COMPLETED | OUTPATIENT
Start: 2022-11-16 | End: 2022-11-16

## 2022-11-16 NOTE — TELEPHONE ENCOUNTER
Called and spoke to [facility] EMERGENCY MEDICAL [place] on-call and discussed pt s/s and previous hx of CHTN. TORB for pt to come in today for BP check, do straight cath urine for STAT PCR ratio. Also have pt complete STAT CMP and CBC. Pt called and informed of [facility] recs. Pt states understanding, indicates she needs to pull her kids out of school and then will head this way for testing. Pt lives about 45 mins away.

## 2022-11-16 NOTE — TELEPHONE ENCOUNTER
Is she on any meds for cHTN? Has she been taking them?  Was she on mag -- need more info & run it verbally by MD on call once all info received

## 2022-11-16 NOTE — PROGRESS NOTES
Pt is a 43year old female admitted to 373/373-A. Patient presents with:  Elevated BP: Admission for pp magnesium      Pt is  38w5d intra-uterine pregnancy. History obtained, consents signed. Oriented to room, staff, and plan of care.

## 2022-11-16 NOTE — PROGRESS NOTES
See below. Pt returned for urine straight cath and successful return of urine sent to run for protein creatinine ratio. JMN on call notified of pressures and below pt hx. CBC is resulted. CMP and urine in process. V/O received for pt to continue taking labetalol and monitor pressures. Advised pt to call back if having symptoms discussed or if B/P >160/100. Informed pt we are waiting for CMP and urine result.

## 2022-11-16 NOTE — TELEPHONE ENCOUNTER
As MD on call, notified about patients elevated AST/ALT values. Called patient and discussed recommendation for admission with magnesium sulfate due to suspicion for superimposed pre-eclampsia due to increasing liver enzymes. Patient also reports increasing swelling, and blood pressures have been increasing over the past day. She was instructed to go to [facility] for direct admission, all questions answered and patient agreed with plans of care.

## 2022-11-16 NOTE — ANESTHESIA PROCEDURE NOTES
Peripheral IV  Date/Time: 11/16/2022 5:35 PM  Inserted by: Shanti Jarrett MD    Placement  Needle size: 20 G  Laterality: right  Location: hand  Local anesthetic: none  Site prep: alcohol  Technique: anatomical landmarks  Attempts: 1

## 2022-11-16 NOTE — TELEPHONE ENCOUNTER
Pt has hx of CHTN, delivered on  . Pt has appt tomorrow for BP check in office. Calling today regarding swelling in legs from knees to feet and BP of 146/81. Pt states swelling in lower legs occurred before leaving the hospital but notes they are tight and shiny. Pt states she has been up and on her feet a lot since coming home and when she elevates her legs it is only for a hour or so on one pillow. Pt denies any HA, blurry vision or RUQ pain. Pt informed that she have PP swelling, to make sure she is drinking at least 64 oz of water, makes sure elevate feet above heart level whenever sitting and compression stockings when she is up and on her feet during the day. Offered pt appt in office today for BP check, declined since appt tomorrow is around baby's MD appt. Pt rec'd to continue to monitor BP's and keep a log if 159 or >/109 or > 15 mins apart x2, has HA, blurry vision or RUQ pain to call the office back. We also discussed that pt should empty bladder before taking pressures, make sure she is sitting in a quiet room for at least 15 mins without distraction and feet flat on the floor. Pt also instructed to bring BP cuff with to tomorrows appt. Pt states understanding. Pt is on Labetalol 200 mg BID  Taking pressures more then BID    Yesterday  144/84  136/81  135/80  142/84    Today  146/81  147/84        To [facility] on-call to review and advise. Thank you.

## 2022-11-16 NOTE — PROGRESS NOTES
Pt is 3 days PP,  22, presents to clinic for blood pressure check and urine straight cath. See 22 TE. Pt with CHTN, on labetalol 200 mg BID, pt took dose at 9 am this morning. 2+ pitting edema to bilateral lower legs and feet. Denies HA, blurry vision, floaters and upper right side abdominal pain. Pt is not breast feeding. Light to moderate lochia. B/P in vitals section. Urine straight cath inserted with only one drop of urine. JLK to room to assess and states pt to hydrate and repeat straight cath when she has the urge to urinate. Provided pt with water and pt will go to the lab now for blood work ordered by [employer].

## 2022-11-17 LAB
ALBUMIN SERPL-MCNC: 2.5 G/DL (ref 3.4–5)
ALBUMIN SERPL-MCNC: 2.6 G/DL (ref 3.4–5)
ALBUMIN/GLOB SERPL: 0.6 {RATIO} (ref 1–2)
ALBUMIN/GLOB SERPL: 0.7 {RATIO} (ref 1–2)
ALP LIVER SERPL-CCNC: 125 U/L
ALP LIVER SERPL-CCNC: 139 U/L
ALT SERPL-CCNC: 91 U/L
ALT SERPL-CCNC: 94 U/L
ANION GAP SERPL CALC-SCNC: 7 MMOL/L (ref 0–18)
ANION GAP SERPL CALC-SCNC: 9 MMOL/L (ref 0–18)
AST SERPL-CCNC: 66 U/L (ref 15–37)
AST SERPL-CCNC: 83 U/L (ref 15–37)
BASOPHILS # BLD AUTO: 0.04 X10(3) UL (ref 0–0.2)
BASOPHILS NFR BLD AUTO: 0.5 %
BILIRUB SERPL-MCNC: 0.2 MG/DL (ref 0.1–2)
BILIRUB SERPL-MCNC: 0.3 MG/DL (ref 0.1–2)
BUN BLD-MCNC: 7 MG/DL (ref 7–18)
BUN BLD-MCNC: 8 MG/DL (ref 7–18)
BUN/CREAT SERPL: 10.3 (ref 10–20)
BUN/CREAT SERPL: 10.9 (ref 10–20)
CALCIUM BLD-MCNC: 8.2 MG/DL (ref 8.5–10.1)
CALCIUM BLD-MCNC: 8.3 MG/DL (ref 8.5–10.1)
CHLORIDE SERPL-SCNC: 104 MMOL/L (ref 98–112)
CHLORIDE SERPL-SCNC: 104 MMOL/L (ref 98–112)
CO2 SERPL-SCNC: 26 MMOL/L (ref 21–32)
CO2 SERPL-SCNC: 29 MMOL/L (ref 21–32)
CREAT BLD-MCNC: 0.64 MG/DL
CREAT BLD-MCNC: 0.78 MG/DL
DEPRECATED RDW RBC AUTO: 51.7 FL (ref 35.1–46.3)
DEPRECATED RDW RBC AUTO: 53.3 FL (ref 35.1–46.3)
EOSINOPHIL # BLD AUTO: 0.08 X10(3) UL (ref 0–0.7)
EOSINOPHIL NFR BLD AUTO: 1 %
ERYTHROCYTE [DISTWIDTH] IN BLOOD BY AUTOMATED COUNT: 16.7 % (ref 11–15)
ERYTHROCYTE [DISTWIDTH] IN BLOOD BY AUTOMATED COUNT: 17.1 % (ref 11–15)
GFR SERPLBLD BASED ON 1.73 SQ M-ARVRAT: 113 ML/MIN/1.73M2 (ref 60–?)
GFR SERPLBLD BASED ON 1.73 SQ M-ARVRAT: 97 ML/MIN/1.73M2 (ref 60–?)
GLOBULIN PLAS-MCNC: 3.7 G/DL (ref 2.8–4.4)
GLOBULIN PLAS-MCNC: 3.9 G/DL (ref 2.8–4.4)
GLUCOSE BLD-MCNC: 109 MG/DL (ref 70–99)
GLUCOSE BLD-MCNC: 84 MG/DL (ref 70–99)
HCT VFR BLD AUTO: 29.3 %
HCT VFR BLD AUTO: 30.2 %
HGB BLD-MCNC: 9.6 G/DL
HGB BLD-MCNC: 9.7 G/DL
IMM GRANULOCYTES # BLD AUTO: 0.08 X10(3) UL (ref 0–1)
IMM GRANULOCYTES NFR BLD: 1 %
LYMPHOCYTES # BLD AUTO: 1.41 X10(3) UL (ref 1–4)
LYMPHOCYTES NFR BLD AUTO: 17.1 %
MAGNESIUM SERPL-MCNC: 5.1 MG/DL (ref 4.8–8.4)
MAGNESIUM SERPL-MCNC: 5.9 MG/DL (ref 4.8–8.4)
MAGNESIUM SERPL-MCNC: 6.1 MG/DL (ref 4.8–8.4)
MCH RBC QN AUTO: 27.9 PG (ref 26–34)
MCH RBC QN AUTO: 28.1 PG (ref 26–34)
MCHC RBC AUTO-ENTMCNC: 32.1 G/DL (ref 31–37)
MCHC RBC AUTO-ENTMCNC: 32.8 G/DL (ref 31–37)
MCV RBC AUTO: 85.7 FL
MCV RBC AUTO: 86.8 FL
MONOCYTES # BLD AUTO: 0.74 X10(3) UL (ref 0.1–1)
MONOCYTES NFR BLD AUTO: 9 %
NEUTROPHILS # BLD AUTO: 5.89 X10 (3) UL (ref 1.5–7.7)
NEUTROPHILS # BLD AUTO: 5.89 X10(3) UL (ref 1.5–7.7)
NEUTROPHILS NFR BLD AUTO: 71.4 %
OSMOLALITY SERPL CALC.SUM OF ELEC: 285 MOSM/KG (ref 275–295)
OSMOLALITY SERPL CALC.SUM OF ELEC: 289 MOSM/KG (ref 275–295)
PLATELET # BLD AUTO: 328 10(3)UL (ref 150–450)
PLATELET # BLD AUTO: 337 10(3)UL (ref 150–450)
POTASSIUM SERPL-SCNC: 3.4 MMOL/L (ref 3.5–5.1)
POTASSIUM SERPL-SCNC: 3.9 MMOL/L (ref 3.5–5.1)
PROT SERPL-MCNC: 6.3 G/DL (ref 6.4–8.2)
PROT SERPL-MCNC: 6.4 G/DL (ref 6.4–8.2)
RBC # BLD AUTO: 3.42 X10(6)UL
RBC # BLD AUTO: 3.48 X10(6)UL
SODIUM SERPL-SCNC: 139 MMOL/L (ref 136–145)
SODIUM SERPL-SCNC: 140 MMOL/L (ref 136–145)
WBC # BLD AUTO: 8 X10(3) UL (ref 4–11)
WBC # BLD AUTO: 8.2 X10(3) UL (ref 4–11)

## 2022-11-17 RX ORDER — POTASSIUM CHLORIDE 20 MEQ/1
20 TABLET, EXTENDED RELEASE ORAL DAILY
Status: DISCONTINUED | OUTPATIENT
Start: 2022-11-17 | End: 2022-11-18

## 2022-11-17 RX ORDER — LABETALOL 100 MG/1
100 TABLET, FILM COATED ORAL ONCE
Status: COMPLETED | OUTPATIENT
Start: 2022-11-17 | End: 2022-11-17

## 2022-11-17 RX ORDER — CALCIUM CARBONATE 200(500)MG
1500 TABLET,CHEWABLE ORAL DAILY
Status: DISCONTINUED | OUTPATIENT
Start: 2022-11-17 | End: 2022-11-18

## 2022-11-17 NOTE — PROGRESS NOTES
[facility]    OB/Gyne Post  Progress Note      Lane Tlaamantes Patient Status:  Inpatient    1980 MRN [id number]   Location [address] Attending Sarbjit Jias, [facility] Day # 1 PCP Mack Haley MD       Subjective     Doing well overnight. Remains on magnesium sulfate. AST and ALT decreasing. Bps stable in 130-140s/60-70s. She denies headache, fever, chest pain, shortness of breath, dizziness upon ambulation nor leg pain. Objective   Vital signs in last 24 hours:   22  0400 22  0500 22  0600 22  0700   BP: 142/63 133/64 139/66 144/71   Pulse: 85 97 87 92   Resp:    Temp:       TempSrc:       SpO2: 97% 98% 97% 96%        Input/Output:    Intake/Output Summary (Last 24 hours) at 2022 0942  Last data filed at 2022 0700  Gross per 24 hour   Intake 1025 ml   Output 4885 ml   Net -3860 ml       AVSS  Constitutional: comfortable  Abdomen: soft nontender  Uterus: fundus below umbilicus, non tender  Extremities: No calf tenderness, SCDs on while in bed, 1+ pitting edema.       Results:   Labs / Path / Radiology:    Recent Results (from the past 24 hour(s))   COMP METABOLIC PANEL (14)    Collection Time: 22  2:17 PM   Result Value Ref Range    Glucose 72 70 - 99 mg/dL    Sodium 142 136 - 145 mmol/L    Potassium 3.6 3.5 - 5.1 mmol/L    Chloride 108 98 - 112 mmol/L    CO2 26.0 21.0 - 32.0 mmol/L    Anion Gap 8 0 - 18 mmol/L    BUN 11 7 - 18 mg/dL    Creatinine 0.78 0.55 - 1.02 mg/dL    BUN/CREA Ratio 14.1 10.0 - 20.0    Calcium, Total 9.4 8.5 - 10.1 mg/dL    Calculated Osmolality 292 275 - 295 mOsm/kg    eGFR-Cr 97 >=60 mL/min/1.73m2     (H) 13 - 56 U/L     (H) 15 - 37 U/L    Alkaline Phosphatase 122 (H) 37 - 98 U/L    Bilirubin, Total 0.4 0.1 - 2.0 mg/dL    Total Protein 6.5 6.4 - 8.2 g/dL    Albumin 2.7 (L) 3.4 - 5.0 g/dL    Globulin  3.8 2.8 - 4.4 g/dL    A/G Ratio 0.7 (L) 1.0 - 2.0    Patient Fasting for CMP?  Yes    CBC W/ DIFFERENTIAL    Collection Time: 11/16/22  2:17 PM   Result Value Ref Range    WBC 7.9 4.0 - 11.0 x10(3) uL    RBC 3.39 (L) 3.80 - 5.30 x10(6)uL    HGB 9.3 (L) 12.0 - 16.0 g/dL    HCT 29.8 (L) 35.0 - 48.0 %    MCV 87.9 80.0 - 100.0 fL    MCH 27.4 26.0 - 34.0 pg    MCHC 31.2 31.0 - 37.0 g/dL    RDW-SD 54.2 (H) 35.1 - 46.3 fL    RDW 16.8 (H) 11.0 - 15.0 %    .0 150.0 - 450.0 10(3)uL    Neutrophil Absolute Prelim 4.88 1.50 - 7.70 x10 (3) uL    Neutrophil Absolute 4.88 1.50 - 7.70 x10(3) uL    Lymphocyte Absolute 2.05 1.00 - 4.00 x10(3) uL    Monocyte Absolute 0.80 0.10 - 1.00 x10(3) uL    Eosinophil Absolute 0.08 0.00 - 0.70 x10(3) uL    Basophil Absolute 0.04 0.00 - 0.20 x10(3) uL    Immature Granulocyte Absolute 0.05 0.00 - 1.00 x10(3) uL    Neutrophil % 61.9 %    Lymphocyte % 25.9 %    Monocyte % 10.1 %    Eosinophil % 1.0 %    Basophil % 0.5 %    Immature Granulocyte % 0.6 %   URINE PROTEIN/CREATININE RATIO, RANDOM    Collection Time: 11/16/22  3:07 PM   Result Value Ref Range    Total Protein Urine Random 8.4 mg/dL    Creatinine Ur Random 58.60 mg/dL    Urine Protein/Creatinine Ratio, Random 0.14    Magnesium Sulfate Therapy    Collection Time: 11/17/22  1:24 AM   Result Value Ref Range    MG Sulfate Therapy 5.1 4.8 - 8.4 mg/dL   Comp Metabolic Panel (14)    Collection Time: 11/17/22  5:26 AM   Result Value Ref Range    Glucose 84 70 - 99 mg/dL    Sodium 139 136 - 145 mmol/L    Potassium 3.4 (L) 3.5 - 5.1 mmol/L    Chloride 104 98 - 112 mmol/L    CO2 26.0 21.0 - 32.0 mmol/L    Anion Gap 9 0 - 18 mmol/L    BUN 7 7 - 18 mg/dL    Creatinine 0.64 0.55 - 1.02 mg/dL    BUN/CREA Ratio 10.9 10.0 - 20.0    Calcium, Total 8.3 (L) 8.5 - 10.1 mg/dL    Calculated Osmolality 285 275 - 295 mOsm/kg    eGFR-Cr 113 >=60 mL/min/1.73m2    ALT 94 (H) 13 - 56 U/L    AST 83 (H) 15 - 37 U/L    Alkaline Phosphatase 125 (H) 37 - 98 U/L    Bilirubin, Total 0.3 0.1 - 2.0 mg/dL    Total Protein 6.3 (L) 6.4 - 8.2 g/dL    Albumin 2.6 (L) 3.4 - 5.0 g/dL    Globulin  3.7 2.8 - 4.4 g/dL    A/G Ratio 0.7 (L) 1.0 - 2.0   Magnesium Sulfate Therapy    Collection Time: 11/17/22  5:26 AM   Result Value Ref Range    MG Sulfate Therapy 5.9 4.8 - 8.4 mg/dL   CBC W/ DIFFERENTIAL    Collection Time: 11/17/22  5:26 AM   Result Value Ref Range    WBC 8.2 4.0 - 11.0 x10(3) uL    RBC 3.42 (L) 3.80 - 5.30 x10(6)uL    HGB 9.6 (L) 12.0 - 16.0 g/dL    HCT 29.3 (L) 35.0 - 48.0 %    MCV 85.7 80.0 - 100.0 fL    MCH 28.1 26.0 - 34.0 pg    MCHC 32.8 31.0 - 37.0 g/dL    RDW-SD 51.7 (H) 35.1 - 46.3 fL    RDW 16.7 (H) 11.0 - 15.0 %    .0 150.0 - 450.0 10(3)uL    Neutrophil Absolute Prelim 5.89 1.50 - 7.70 x10 (3) uL    Neutrophil Absolute 5.89 1.50 - 7.70 x10(3) uL    Lymphocyte Absolute 1.41 1.00 - 4.00 x10(3) uL    Monocyte Absolute 0.74 0.10 - 1.00 x10(3) uL    Eosinophil Absolute 0.08 0.00 - 0.70 x10(3) uL    Basophil Absolute 0.04 0.00 - 0.20 x10(3) uL    Immature Granulocyte Absolute 0.08 0.00 - 1.00 x10(3) uL    Neutrophil % 71.4 %    Lymphocyte % 17.1 %    Monocyte % 9.0 %    Eosinophil % 1.0 %    Basophil % 0.5 %    Immature Granulocyte % 1.0 %       Specimens (From admission, onward)    None          No results found. Assessment/Plan   43year oldyo [id number] , s/p spontaneous vaginal, PPD# 4 - readmit for chronic HTN with superimposed pre-eclampsia with severe features   Patient Active Problem List:     Essential hypertension     Depressive disorder     Pre-existing essential hypertension during pregnancy     Proteinuria complicating pregnancy, first trimester     AMA--41     BMI 41     Cervical polyp     Antepartum anemia     COVID-19 affecting pregnancy in third trimester     Elevated LFTs     Anemia affecting pregnancy in third trimester     Elevated liver enzymes     Preeclampsia  .     Continue magnesium sulfate for 24 hours  Strict I&Os  Repeat CMP at 1800  Labetalol 200 mg BID    Felisha Burger MD

## 2022-11-18 ENCOUNTER — TELEPHONE (OUTPATIENT)
Dept: OBGYN CLINIC | Facility: CLINIC | Age: 42
End: 2022-11-18

## 2022-11-18 VITALS
RESPIRATION RATE: 18 BRPM | OXYGEN SATURATION: 99 % | SYSTOLIC BLOOD PRESSURE: 137 MMHG | TEMPERATURE: 99 F | HEART RATE: 84 BPM | DIASTOLIC BLOOD PRESSURE: 72 MMHG

## 2022-11-18 PROCEDURE — 99238 HOSP IP/OBS DSCHRG MGMT 30/<: CPT | Performed by: OBSTETRICS & GYNECOLOGY

## 2022-11-18 RX ORDER — LABETALOL 100 MG/1
100 TABLET, FILM COATED ORAL 2 TIMES DAILY
Qty: 60 TABLET | Refills: 0 | Status: SHIPPED | OUTPATIENT
Start: 2022-11-18

## 2022-11-18 NOTE — TELEPHONE ENCOUNTER
Pt scheduled 11/29 at 11am. Directed to keep log and send in 1 week . Patient verbalized understanding.

## 2022-11-18 NOTE — TELEPHONE ENCOUNTER
Discharge today after PP pre-eclampsia. Needs RN BP check on Tuesday November 29th around 1015 PEDS appt with Dr Waqas Hummel. They are coming from [facility]. She will send BP diary next week.

## 2022-11-18 NOTE — DISCHARGE INSTRUCTIONS
Continue twice daily home blood pressure monitoring to be taken 2 hours after each Labetalol dose. Call if worsening headache, visual changes, leg swelling or persistent blood pressure greater than 155 / 100.

## 2022-11-18 NOTE — DISCHARGE SUMMARY
[facility]  Discharge Summary    Nivia Garcia Patient Status:  Inpatient    1980 MRN [id number]   Location [address] Attending Jazmyn Sanders MD   [facility] Day # 2 PCP Nilam Lara MD           Date of Admission: 2022 Disposition: Home or Self Care     Date of Discharge: 2022      Admitting Diagnosis: Postpartum Preeclampsia      Discharge Diagnosis: Same       Reason for Admission: Treatment of pre-eclampsia    Hospital Course: Uncomplicated. Have IV magnesium x 24 hours along with increase in Labetalol dose to 300 BID    Procedures: none    Complications: None apparent    Discharge Condition: Stable    Discharge Medications: Current Discharge Medication List    New Orders    !! labetalol 100 MG Oral Tab  Take 1 tablet (100 mg total) by mouth 2 (two) times daily. Take with current Labetalol 200 mg twice daily for total of 300 mg twice daily    ! ! - Potential duplicate medications found. Please discuss with provider. Home Meds - Unchanged    docusate sodium 100 MG Oral Cap  Take 100 mg by mouth 2 (two) times daily. Prenatal Vit-Fe Fumarate-FA (SM PRENATAL VITAMINS) 28-0.8 MG Oral Tab  Take by mouth. ibuprofen 600 MG Oral Tab  Take 1 tablet (600 mg total) by mouth every 6 (six) hours as needed for Pain. !! LABETALOL 200 MG Oral Tab  TAKE 1 TABLET BY MOUTH TWICE A DAY    IRON OR      Ergocalciferol (VITAMIN D OR)  Take by mouth. Follow up Visits: Follow-up with Dev Salinas in 6 weeks and office BP check on . Will continue to send BP diary next week. Other Discharge Instructions: Call if increased pain, bleeding or oral temperature > 100.3. Nothing in vagina. No intercourse. Ole Jones  2022  11:22 AM

## 2022-11-20 ENCOUNTER — TELEPHONE (OUTPATIENT)
Dept: OBGYN CLINIC | Facility: CLINIC | Age: 42
End: 2022-11-20

## 2022-11-20 NOTE — TELEPHONE ENCOUNTER
On call--    PP female s/p readmit this week for mag sulfate. Home on labetalol 300mg BID. Yesterday BPs 140s-160s/80-90s. No symptoms. This morning check BPs and 120s/80s and felt light headed when she got out of bed. recs to continue current dose and check routinely today. If continues 120s then may need to decrease labetalol dose.   Encouraged aggressive hydration and getting up slowly when planning to ambulate

## 2022-11-26 ENCOUNTER — PATIENT MESSAGE (OUTPATIENT)
Dept: OBGYN CLINIC | Facility: CLINIC | Age: 42
End: 2022-11-26

## 2022-11-28 ENCOUNTER — PATIENT MESSAGE (OUTPATIENT)
Dept: OBGYN CLINIC | Facility: CLINIC | Age: 42
End: 2022-11-28

## 2022-11-28 ENCOUNTER — NST DOCUMENTATION (OUTPATIENT)
Dept: OBGYN CLINIC | Facility: CLINIC | Age: 42
End: 2022-11-28

## 2022-11-28 NOTE — NST
Nonstress Test   Patient: Elisa Reyna    10/26/22    Diagnosis from order:   AMA, Obesity        NST: 125/mod/R       Castillo Reyes DO  11/28/2022  12:35 PM

## 2022-11-28 NOTE — TELEPHONE ENCOUNTER
This is from a previous NST,  Im getting caught up on my hospital notes and just completed reviewing one

## 2022-11-28 NOTE — TELEPHONE ENCOUNTER
There is a note about an NST from 11/28/22 from [employer]. Message to [employer] to please see pts my chart encounter.

## 2022-11-28 NOTE — TELEPHONE ENCOUNTER
From: Joanna Dallas  To: Hernando Jones DO  Sent: 11/26/2022 1:58 PM CST  Subject: Blood Pressure Readings     Hi. I believe the higher readings early on might be inaccurate. I researched how to properly take your blood pressure and in some cases I had the arm cuff to high up. I have been placing it right above my elbow crease at my heart level. The only thing I experience is some light headed moments when getting out of bed or getting up from sitting down.

## 2022-11-28 NOTE — TELEPHONE ENCOUNTER
Thanks for sending your log. Dr. Dennie Bracken reviewed your blood pressures. You can decrease your dosing to labetalol 200 mg twice a day. Please send another log in 1 week. We'll see you tomorrow for your BP check.     Let us know what questions you have,  Charise Cowden, RN

## 2022-11-29 ENCOUNTER — NURSE ONLY (OUTPATIENT)
Dept: OBGYN CLINIC | Facility: CLINIC | Age: 42
End: 2022-11-29
Payer: COMMERCIAL

## 2022-11-29 VITALS
SYSTOLIC BLOOD PRESSURE: 127 MMHG | BODY MASS INDEX: 39 KG/M2 | DIASTOLIC BLOOD PRESSURE: 85 MMHG | HEART RATE: 84 BPM | WEIGHT: 230 LBS

## 2022-11-29 DIAGNOSIS — Z01.30 BP CHECK: Primary | ICD-10-CM

## 2022-11-29 PROCEDURE — 99212 OFFICE O/P EST SF 10 MIN: CPT | Performed by: OBSTETRICS & GYNECOLOGY

## 2022-11-29 PROCEDURE — 3074F SYST BP LT 130 MM HG: CPT | Performed by: OBSTETRICS & GYNECOLOGY

## 2022-11-29 PROCEDURE — 3079F DIAST BP 80-89 MM HG: CPT | Performed by: OBSTETRICS & GYNECOLOGY

## 2022-11-29 NOTE — PROGRESS NOTES
SEEN PATIENT TODAY FOR PP BP CHECK. SHE DELIVERED BABY ON 11/18/ AT 38W 5D. SHE WAS NOT ON ANY BP RX IN THE HOSPITAL. TODAY'S READING 127/85 AND HAVE BEEN A LITTLE LIGHT HEADED. ADVISED ON CALL MD LAST HE STATED HE SEEN HER BP LOG SHE SENT IN [facility]. HE WANTS HER TO CONTINUE TO TAKE LABETALOL 200 MG TWICE DAILY AND TO RECORD BP 2X A DAY FOR THE NEXT WEEK AND SEND RESULTS IN [facility] THE NEXT FOLLOWING WEEK. PATIENT VERBALIZED UNDERSTANDING.

## 2022-12-07 ENCOUNTER — TELEPHONE (OUTPATIENT)
Dept: OBGYN UNIT | Facility: HOSPITAL | Age: 42
End: 2022-12-07

## 2022-12-07 NOTE — PROGRESS NOTES
Message left to call physicians office with questions. [facility] call letter sent via [employer].

## 2022-12-10 DIAGNOSIS — I10 ESSENTIAL HYPERTENSION: ICD-10-CM

## 2022-12-12 DIAGNOSIS — I10 ESSENTIAL HYPERTENSION: ICD-10-CM

## 2022-12-12 RX ORDER — LABETALOL 100 MG/1
TABLET, FILM COATED ORAL
Qty: 60 TABLET | Refills: 0 | OUTPATIENT
Start: 2022-12-12

## 2022-12-12 RX ORDER — LABETALOL 200 MG/1
200 TABLET, FILM COATED ORAL 2 TIMES DAILY
Qty: 60 TABLET | Refills: 0 | Status: SHIPPED | OUTPATIENT
Start: 2022-12-12

## 2022-12-12 RX ORDER — LABETALOL 200 MG/1
TABLET, FILM COATED ORAL
Qty: 180 TABLET | Refills: 0 | OUTPATIENT
Start: 2022-12-12

## 2022-12-23 RX ORDER — LABETALOL 100 MG/1
TABLET, FILM COATED ORAL
Qty: 60 TABLET | Refills: 0 | OUTPATIENT
Start: 2022-12-23

## 2023-01-03 ENCOUNTER — TELEPHONE (OUTPATIENT)
Dept: OBGYN CLINIC | Facility: CLINIC | Age: 43
End: 2023-01-03

## 2023-01-03 NOTE — TELEPHONE ENCOUNTER
Patient need a nurse to call to set up her post partum visit with Dr Annie Arnett, patient states she need to be in by 01/18/2024 no appt with Dr Annie Arnett available by 01/18/2024.   Please advise

## 2023-01-03 NOTE — TELEPHONE ENCOUNTER
Patient delivered 11/13/22, due for PP by 1/8/23. Offered appt today at 3:50pm, but pt cannot make it. JMN are we able to add her on for PP visit any other day this week?

## 2023-01-05 ENCOUNTER — POSTPARTUM (OUTPATIENT)
Dept: OBGYN CLINIC | Facility: CLINIC | Age: 43
End: 2023-01-05
Payer: COMMERCIAL

## 2023-01-05 VITALS
HEART RATE: 82 BPM | SYSTOLIC BLOOD PRESSURE: 126 MMHG | WEIGHT: 224.19 LBS | BODY MASS INDEX: 38 KG/M2 | DIASTOLIC BLOOD PRESSURE: 85 MMHG

## 2023-01-05 PROBLEM — O99.019 ANTEPARTUM ANEMIA: Status: RESOLVED | Noted: 2022-04-27 | Resolved: 2023-01-05

## 2023-01-05 PROBLEM — O09.521 MULTIGRAVIDA OF ADVANCED MATERNAL AGE IN FIRST TRIMESTER: Status: RESOLVED | Noted: 2022-04-27 | Resolved: 2023-01-05

## 2023-01-05 PROBLEM — O12.11: Status: RESOLVED | Noted: 2022-04-26 | Resolved: 2023-01-05

## 2023-01-05 PROBLEM — O98.513 COVID-19 AFFECTING PREGNANCY IN THIRD TRIMESTER: Status: RESOLVED | Noted: 2022-09-06 | Resolved: 2023-01-05

## 2023-01-05 PROBLEM — O14.90 PREECLAMPSIA (HCC): Status: RESOLVED | Noted: 2022-11-16 | Resolved: 2023-01-05

## 2023-01-05 PROBLEM — O98.513 COVID-19 AFFECTING PREGNANCY IN THIRD TRIMESTER (HCC): Status: RESOLVED | Noted: 2022-09-06 | Resolved: 2023-01-05

## 2023-01-05 PROBLEM — O10.019: Status: RESOLVED | Noted: 2022-04-13 | Resolved: 2023-01-05

## 2023-01-05 PROBLEM — O99.019 ANTEPARTUM ANEMIA (HCC): Status: RESOLVED | Noted: 2022-04-27 | Resolved: 2023-01-05

## 2023-01-05 PROBLEM — U07.1 COVID-19 AFFECTING PREGNANCY IN THIRD TRIMESTER: Status: RESOLVED | Noted: 2022-09-06 | Resolved: 2023-01-05

## 2023-01-05 PROBLEM — U07.1 COVID-19 AFFECTING PREGNANCY IN THIRD TRIMESTER (HCC): Status: RESOLVED | Noted: 2022-09-06 | Resolved: 2023-01-05

## 2023-01-05 PROBLEM — O09.521 MULTIGRAVIDA OF ADVANCED MATERNAL AGE IN FIRST TRIMESTER (HCC): Status: RESOLVED | Noted: 2022-04-27 | Resolved: 2023-01-05

## 2023-01-05 PROBLEM — O14.90 PREECLAMPSIA: Status: RESOLVED | Noted: 2022-11-16 | Resolved: 2023-01-05

## 2023-01-05 PROCEDURE — 3079F DIAST BP 80-89 MM HG: CPT | Performed by: OBSTETRICS & GYNECOLOGY

## 2023-01-05 PROCEDURE — 3074F SYST BP LT 130 MM HG: CPT | Performed by: OBSTETRICS & GYNECOLOGY

## 2023-01-11 DIAGNOSIS — I10 ESSENTIAL HYPERTENSION: ICD-10-CM

## 2023-01-13 RX ORDER — LABETALOL 200 MG/1
TABLET, FILM COATED ORAL
Qty: 60 TABLET | Refills: 0 | OUTPATIENT
Start: 2023-01-13

## 2023-01-13 NOTE — TELEPHONE ENCOUNTER
PP visit was on 1/5/2023. Last rx was given on 12/12/2022 For #60 with 0 refills, take one tablet daily. Pt will need to see pcp for future refills. [employer] message sent to pt. Refills denied.

## 2023-01-16 DIAGNOSIS — I10 ESSENTIAL HYPERTENSION: ICD-10-CM

## 2023-01-18 RX ORDER — LABETALOL 200 MG/1
TABLET, FILM COATED ORAL
Qty: 60 TABLET | Refills: 0 | Status: SHIPPED | OUTPATIENT
Start: 2023-01-18

## 2023-02-01 ENCOUNTER — OFFICE VISIT (OUTPATIENT)
Dept: FAMILY MEDICINE CLINIC | Facility: CLINIC | Age: 43
End: 2023-02-01
Payer: COMMERCIAL

## 2023-02-01 VITALS
RESPIRATION RATE: 18 BRPM | BODY MASS INDEX: 41 KG/M2 | WEIGHT: 236 LBS | OXYGEN SATURATION: 98 % | DIASTOLIC BLOOD PRESSURE: 82 MMHG | HEART RATE: 96 BPM | SYSTOLIC BLOOD PRESSURE: 126 MMHG

## 2023-02-01 DIAGNOSIS — R06.83 SNORING: ICD-10-CM

## 2023-02-01 DIAGNOSIS — E66.01 CLASS 3 SEVERE OBESITY DUE TO EXCESS CALORIES WITHOUT SERIOUS COMORBIDITY WITH BODY MASS INDEX (BMI) OF 40.0 TO 44.9 IN ADULT (HCC): Primary | ICD-10-CM

## 2023-02-01 PROCEDURE — 3074F SYST BP LT 130 MM HG: CPT | Performed by: PHYSICIAN ASSISTANT

## 2023-02-01 PROCEDURE — 99214 OFFICE O/P EST MOD 30 MIN: CPT | Performed by: PHYSICIAN ASSISTANT

## 2023-02-01 PROCEDURE — 3079F DIAST BP 80-89 MM HG: CPT | Performed by: PHYSICIAN ASSISTANT

## 2023-02-01 RX ORDER — PHENTERMINE HYDROCHLORIDE 37.5 MG/1
37.5 TABLET ORAL
Qty: 30 TABLET | Refills: 2 | Status: SHIPPED | OUTPATIENT
Start: 2023-02-01

## 2023-02-01 RX ORDER — RIBOFLAVIN (VITAMIN B2) 100 MG
100 TABLET ORAL DAILY
COMMUNITY

## 2023-02-01 RX ORDER — UBIDECARENONE 30 MG
CAPSULE ORAL
COMMUNITY

## 2023-02-01 RX ORDER — TOPIRAMATE 25 MG/1
25 TABLET ORAL 2 TIMES DAILY
Qty: 60 TABLET | Refills: 2 | Status: SHIPPED | OUTPATIENT
Start: 2023-02-01

## 2023-02-08 RX ORDER — PHENTERMINE HYDROCHLORIDE 37.5 MG/1
37.5 TABLET ORAL
Qty: 30 TABLET | Refills: 2 | Status: SHIPPED | OUTPATIENT
Start: 2023-02-08 | End: 2023-02-13

## 2023-02-12 LAB
% SATURATION: 18 % (CALC) (ref 16–45)
ABSOLUTE BASOPHILS: 48 CELLS/UL (ref 0–200)
ABSOLUTE EOSINOPHILS: 58 CELLS/UL (ref 15–500)
ABSOLUTE LYMPHOCYTES: 2205 CELLS/UL (ref 850–3900)
ABSOLUTE MONOCYTES: 594 CELLS/UL (ref 200–950)
ABSOLUTE NEUTROPHILS: 2396 CELLS/UL (ref 1500–7800)
ALBUMIN/GLOBULIN RATIO: 1.2 (CALC) (ref 1–2.5)
ALBUMIN: 4 G/DL (ref 3.6–5.1)
ALKALINE PHOSPHATASE: 104 U/L (ref 31–125)
ALT: 35 U/L (ref 6–29)
AST: 38 U/L (ref 10–30)
BASOPHILS: 0.9 %
BILIRUBIN, TOTAL: 0.6 MG/DL (ref 0.2–1.2)
BUN: 13 MG/DL (ref 7–25)
CALCIUM: 9.5 MG/DL (ref 8.6–10.2)
CARBON DIOXIDE: 29 MMOL/L (ref 20–32)
CHLORIDE: 102 MMOL/L (ref 98–110)
CREATININE: 0.75 MG/DL (ref 0.5–0.99)
EGFR: 102 ML/MIN/1.73M2
EOSINOPHILS: 1.1 %
FERRITIN: 66 NG/ML (ref 16–232)
GLOBULIN: 3.4 G/DL (CALC) (ref 1.9–3.7)
GLUCOSE: 78 MG/DL (ref 65–99)
HEMATOCRIT: 36.4 % (ref 35–45)
HEMOGLOBIN A1C: 5.3 % OF TOTAL HGB
HEMOGLOBIN: 11.9 G/DL (ref 11.7–15.5)
IRON BINDING CAPACITY: 349 MCG/DL (CALC) (ref 250–450)
IRON, TOTAL: 63 MCG/DL (ref 40–190)
LYMPHOCYTES: 41.6 %
MCH: 28.5 PG (ref 27–33)
MCHC: 32.7 G/DL (ref 32–36)
MCV: 87.1 FL (ref 80–100)
MONOCYTES: 11.2 %
MPV: 11.9 FL (ref 7.5–12.5)
NEUTROPHILS: 45.2 %
PLATELET COUNT: 336 THOUSAND/UL (ref 140–400)
POTASSIUM: 4 MMOL/L (ref 3.5–5.3)
PROTEIN, TOTAL: 7.4 G/DL (ref 6.1–8.1)
RDW: 15 % (ref 11–15)
RED BLOOD CELL COUNT: 4.18 MILLION/UL (ref 3.8–5.1)
SODIUM: 139 MMOL/L (ref 135–146)
WHITE BLOOD CELL COUNT: 5.3 THOUSAND/UL (ref 3.8–10.8)

## 2023-02-19 DIAGNOSIS — I10 ESSENTIAL HYPERTENSION: ICD-10-CM

## 2023-02-21 RX ORDER — LABETALOL 200 MG/1
TABLET, FILM COATED ORAL
Qty: 60 TABLET | Refills: 0 | Status: SHIPPED | OUTPATIENT
Start: 2023-02-21 | End: 2023-02-22

## 2023-02-22 DIAGNOSIS — I10 ESSENTIAL HYPERTENSION: ICD-10-CM

## 2023-02-22 RX ORDER — LABETALOL 200 MG/1
TABLET, FILM COATED ORAL
Qty: 180 TABLET | Refills: 0 | Status: SHIPPED | OUTPATIENT
Start: 2023-02-22

## 2023-03-23 NOTE — TELEPHONE ENCOUNTER
From: Carri Mayfield  To: Aj Gonzalez MD  Sent: 7/5/2022 8:40 AM CDT  Subject: Blood Pressure Readings     6/26 -7/2 Statement Selected

## 2023-03-30 ENCOUNTER — HOSPITAL ENCOUNTER (OUTPATIENT)
Dept: MAMMOGRAPHY | Age: 43
Discharge: HOME | End: 2023-03-30
Attending: PHYSICIAN ASSISTANT
Payer: COMMERCIAL

## 2023-03-30 DIAGNOSIS — Z12.31 VISIT FOR SCREENING MAMMOGRAM: ICD-10-CM

## 2023-03-30 PROCEDURE — 77063 BREAST TOMOSYNTHESIS BI: CPT | Performed by: PHYSICIAN ASSISTANT

## 2023-03-30 PROCEDURE — 77067 SCR MAMMO BI INCL CAD: CPT | Performed by: PHYSICIAN ASSISTANT

## 2023-05-19 ENCOUNTER — OFFICE VISIT (OUTPATIENT)
Dept: SLEEP CENTER | Age: 43
End: 2023-05-19
Attending: INTERNAL MEDICINE
Payer: COMMERCIAL

## 2023-05-19 DIAGNOSIS — R06.83 SNORING: ICD-10-CM

## 2023-05-19 PROCEDURE — 95806 SLEEP STUDY UNATT&RESP EFFT: CPT

## 2023-05-26 DIAGNOSIS — I10 ESSENTIAL HYPERTENSION: ICD-10-CM

## 2023-05-26 RX ORDER — LABETALOL 200 MG/1
TABLET, FILM COATED ORAL
Qty: 180 TABLET | Refills: 0 | Status: SHIPPED | OUTPATIENT
Start: 2023-05-26

## 2023-06-09 ENCOUNTER — APPOINTMENT (OUTPATIENT)
Dept: GENERAL RADIOLOGY | Facility: HOSPITAL | Age: 43
End: 2023-06-09
Attending: EMERGENCY MEDICINE
Payer: COMMERCIAL

## 2023-06-09 ENCOUNTER — HOSPITAL ENCOUNTER (EMERGENCY)
Facility: HOSPITAL | Age: 43
Discharge: HOME | End: 2023-06-09
Attending: EMERGENCY MEDICINE
Payer: COMMERCIAL

## 2023-06-09 VITALS
SYSTOLIC BLOOD PRESSURE: 145 MMHG | TEMPERATURE: 98 F | OXYGEN SATURATION: 100 % | RESPIRATION RATE: 16 BRPM | HEIGHT: 64 IN | BODY MASS INDEX: 42.68 KG/M2 | HEART RATE: 88 BPM | WEIGHT: 250 LBS | DIASTOLIC BLOOD PRESSURE: 90 MMHG

## 2023-06-09 DIAGNOSIS — R07.9 CHEST PAIN WITH LOW RISK FOR CARDIAC ETIOLOGY: Primary | ICD-10-CM

## 2023-06-09 LAB
ALBUMIN SERPL-MCNC: 3.9 G/DL (ref 3.4–5)
ALBUMIN/GLOB SERPL: 0.9 {RATIO} (ref 1–2)
ALP LIVER SERPL-CCNC: 120 U/L
ALT SERPL-CCNC: 33 U/L
ANION GAP SERPL CALC-SCNC: 3 MMOL/L (ref 0–18)
AST SERPL-CCNC: 31 U/L (ref 15–37)
BASOPHILS # BLD AUTO: 0.05 X10(3) UL (ref 0–0.2)
BASOPHILS NFR BLD AUTO: 0.7 %
BILIRUB SERPL-MCNC: 0.7 MG/DL (ref 0.1–2)
BUN BLD-MCNC: 10 MG/DL (ref 7–18)
CALCIUM BLD-MCNC: 9.2 MG/DL (ref 8.5–10.1)
CHLORIDE SERPL-SCNC: 107 MMOL/L (ref 98–112)
CO2 SERPL-SCNC: 26 MMOL/L (ref 21–32)
CREAT BLD-MCNC: 0.78 MG/DL
D DIMER PPP FEU-MCNC: <0.27 UG/ML FEU (ref ?–0.5)
EOSINOPHIL # BLD AUTO: 0.05 X10(3) UL (ref 0–0.7)
EOSINOPHIL NFR BLD AUTO: 0.7 %
ERYTHROCYTE [DISTWIDTH] IN BLOOD BY AUTOMATED COUNT: 14.2 %
GFR SERPLBLD BASED ON 1.73 SQ M-ARVRAT: 97 ML/MIN/1.73M2 (ref 60–?)
GLOBULIN PLAS-MCNC: 4.4 G/DL (ref 2.8–4.4)
GLUCOSE BLD-MCNC: 87 MG/DL (ref 70–99)
HCT VFR BLD AUTO: 37.8 %
HGB BLD-MCNC: 12.4 G/DL
IMM GRANULOCYTES # BLD AUTO: 0.03 X10(3) UL (ref 0–1)
IMM GRANULOCYTES NFR BLD: 0.4 %
LYMPHOCYTES # BLD AUTO: 1.68 X10(3) UL (ref 1–4)
LYMPHOCYTES NFR BLD AUTO: 23.9 %
MCH RBC QN AUTO: 28.7 PG (ref 26–34)
MCHC RBC AUTO-ENTMCNC: 32.8 G/DL (ref 31–37)
MCV RBC AUTO: 87.5 FL
MONOCYTES # BLD AUTO: 0.55 X10(3) UL (ref 0.1–1)
MONOCYTES NFR BLD AUTO: 7.8 %
NEUTROPHILS # BLD AUTO: 4.67 X10 (3) UL (ref 1.5–7.7)
NEUTROPHILS # BLD AUTO: 4.67 X10(3) UL (ref 1.5–7.7)
NEUTROPHILS NFR BLD AUTO: 66.5 %
OSMOLALITY SERPL CALC.SUM OF ELEC: 280 MOSM/KG (ref 275–295)
PLATELET # BLD AUTO: 340 10(3)UL (ref 150–450)
POTASSIUM SERPL-SCNC: 3.6 MMOL/L (ref 3.5–5.1)
PROT SERPL-MCNC: 8.3 G/DL (ref 6.4–8.2)
RBC # BLD AUTO: 4.32 X10(6)UL
SODIUM SERPL-SCNC: 136 MMOL/L (ref 136–145)
TROPONIN I HIGH SENSITIVITY: <3 NG/L
WBC # BLD AUTO: 7 X10(3) UL (ref 4–11)

## 2023-06-09 PROCEDURE — 80053 COMPREHEN METABOLIC PANEL: CPT | Performed by: EMERGENCY MEDICINE

## 2023-06-09 PROCEDURE — 84484 ASSAY OF TROPONIN QUANT: CPT

## 2023-06-09 PROCEDURE — 71045 X-RAY EXAM CHEST 1 VIEW: CPT | Performed by: EMERGENCY MEDICINE

## 2023-06-09 PROCEDURE — 99284 EMERGENCY DEPT VISIT MOD MDM: CPT

## 2023-06-09 PROCEDURE — 99285 EMERGENCY DEPT VISIT HI MDM: CPT

## 2023-06-09 PROCEDURE — 36415 COLL VENOUS BLD VENIPUNCTURE: CPT

## 2023-06-09 PROCEDURE — 93010 ELECTROCARDIOGRAM REPORT: CPT

## 2023-06-09 PROCEDURE — 85379 FIBRIN DEGRADATION QUANT: CPT | Performed by: EMERGENCY MEDICINE

## 2023-06-09 PROCEDURE — 85025 COMPLETE CBC W/AUTO DIFF WBC: CPT

## 2023-06-09 PROCEDURE — 93005 ELECTROCARDIOGRAM TRACING: CPT

## 2023-06-09 PROCEDURE — 85025 COMPLETE CBC W/AUTO DIFF WBC: CPT | Performed by: EMERGENCY MEDICINE

## 2023-06-09 PROCEDURE — 84484 ASSAY OF TROPONIN QUANT: CPT | Performed by: EMERGENCY MEDICINE

## 2023-06-09 PROCEDURE — 80053 COMPREHEN METABOLIC PANEL: CPT

## 2023-06-09 NOTE — ED INITIAL ASSESSMENT (HPI)
At 5 am, felt chest pain while sleeping. Last chest pain episode at 246pm. Saw PA at PCP office yesterday.  Switched from Labetolol to Nifedpine this am. Denies SOB

## 2023-06-10 LAB
ATRIAL RATE: 95 BPM
P AXIS: 62 DEGREES
P-R INTERVAL: 152 MS
Q-T INTERVAL: 344 MS
QRS DURATION: 68 MS
QTC CALCULATION (BEZET): 432 MS
R AXIS: 18 DEGREES
T AXIS: 21 DEGREES
VENTRICULAR RATE: 95 BPM

## 2023-07-12 ENCOUNTER — OFFICE VISIT (OUTPATIENT)
Dept: FAMILY MEDICINE CLINIC | Facility: CLINIC | Age: 43
End: 2023-07-12
Payer: COMMERCIAL

## 2023-07-12 VITALS
RESPIRATION RATE: 18 BRPM | HEIGHT: 64 IN | HEART RATE: 78 BPM | SYSTOLIC BLOOD PRESSURE: 134 MMHG | BODY MASS INDEX: 43 KG/M2 | DIASTOLIC BLOOD PRESSURE: 80 MMHG | TEMPERATURE: 98 F

## 2023-07-12 DIAGNOSIS — M94.0 COSTOCHONDRITIS: Primary | ICD-10-CM

## 2023-07-12 DIAGNOSIS — I10 ESSENTIAL HYPERTENSION: ICD-10-CM

## 2023-07-12 DIAGNOSIS — E66.01 CLASS 3 SEVERE OBESITY DUE TO EXCESS CALORIES WITHOUT SERIOUS COMORBIDITY WITH BODY MASS INDEX (BMI) OF 40.0 TO 44.9 IN ADULT (HCC): ICD-10-CM

## 2023-07-12 PROCEDURE — 3075F SYST BP GE 130 - 139MM HG: CPT | Performed by: PHYSICIAN ASSISTANT

## 2023-07-12 PROCEDURE — 99214 OFFICE O/P EST MOD 30 MIN: CPT | Performed by: PHYSICIAN ASSISTANT

## 2023-07-12 PROCEDURE — 3079F DIAST BP 80-89 MM HG: CPT | Performed by: PHYSICIAN ASSISTANT

## 2023-07-12 RX ORDER — METHOCARBAMOL 750 MG/1
750 TABLET, FILM COATED ORAL 4 TIMES DAILY
Qty: 30 TABLET | Refills: 0 | Status: SHIPPED | OUTPATIENT
Start: 2023-07-12

## 2023-07-12 RX ORDER — PHENTERMINE HYDROCHLORIDE 37.5 MG/1
37.5 TABLET ORAL
Qty: 30 TABLET | Refills: 2 | Status: SHIPPED | OUTPATIENT
Start: 2023-07-12

## 2023-07-12 NOTE — PROGRESS NOTES
Subjective:   Patient ID: Yael Nguyen is a 43year old female. HPI  Patient presents for follow up from last visit and for recent ER visit. Chest pain that took her to ER is not the same now  Still getting some sensation off and on  Started taking tums but not helping    No trigger  Not exertional  Happens when she is sitting for work  Lasts a couple seconds at a time  Hard to describe now, was sharp, shooting before  No palpitations  No sob or chest tightness  Was under a lot of stress after our visit about getting wegovy  No tenderness in the chest wall  Not worse with movement  Occasionally has heartburn and usually tums helps          History/Other:   Review of Systems   Constitutional:  Negative for chills, diaphoresis and fever. Eyes:  Negative for visual disturbance. Respiratory:  Negative for chest tightness and shortness of breath. Cardiovascular:  Positive for chest pain. Negative for palpitations and leg swelling. Neurological:  Negative for dizziness, light-headedness and headaches. Current Outpatient Medications   Medication Sig Dispense Refill    NIFEdipine ER 30 MG Oral Tablet 24 Hr Take 1 tablet (30 mg total) by mouth daily. 90 tablet 1    Multiple Vitamins-Minerals (MULTI FOR HER) Oral Tab Take by mouth. Ergocalciferol (VITAMIN D OR) Take by mouth. Allergies:No Known Allergies    Objective:   Physical Exam  Vitals and nursing note reviewed. Constitutional:       Appearance: She is well-developed. HENT:      Head: Normocephalic and atraumatic. Eyes:      Conjunctiva/sclera: Conjunctivae normal.      Pupils: Pupils are equal, round, and reactive to light. Cardiovascular:      Rate and Rhythm: Normal rate and regular rhythm. Pulmonary:      Effort: Pulmonary effort is normal.      Breath sounds: Normal breath sounds. No wheezing or rales. Chest:      Chest wall: Tenderness (TTP of left and right sternal borders) present.    Musculoskeletal:      Cervical back: Normal range of motion and neck supple. Spasms and tenderness present. Lumbar back: Spasms and tenderness present. Neurological:      Mental Status: She is alert. Assessment & Plan:   1. Costochondritis  May be related to extensive muscle spasm of back. Recommend otc NSAIDs and muscle relaxer as directed. Ice application. Follow up if not soon better or if worsens. - methocarbamol 750 MG Oral Tab; Take 1 tablet (750 mg total) by mouth 4 (four) times daily. Dispense: 30 tablet; Refill: 0    2. Essential hypertension  Better controlled on nifedipine. Cpm. Continue to monitor at home. 3. Class 3 severe obesity due to excess calories without serious comorbidity with body mass index (BMI) of 40.0 to 44.9 in adult (HCC)  BP better controlled on nifedipine. She would like to give phentermine another try. Monitor BP and follow up in 4 weeks to reassess.   - Phentermine HCl 37.5 MG Oral Tab; Take 1 tablet (37.5 mg total) by mouth every morning before breakfast.  Dispense: 30 tablet;  Refill: 2

## 2023-07-23 ENCOUNTER — HOSPITAL ENCOUNTER (OUTPATIENT)
Dept: CT IMAGING | Age: 43
Discharge: HOME | End: 2023-07-23

## 2023-07-23 DIAGNOSIS — Z13.9 SCREENING FOR CONDITION: ICD-10-CM

## 2023-07-28 NOTE — TELEPHONE ENCOUNTER
Reviewed pt labs after her visit today and she is anemic but no iron studies have been done- please call her and inform her that I ordered iron studies and she should have her labs done before her next visit. Quality 110: Preventive Care And Screening: Influenza Immunization: Influenza Immunization not Administered because Patient Refused. Detail Level: Detailed

## 2023-08-25 DIAGNOSIS — I10 ESSENTIAL HYPERTENSION: ICD-10-CM

## 2023-08-25 RX ORDER — NIFEDIPINE 30 MG/1
30 TABLET, FILM COATED, EXTENDED RELEASE ORAL DAILY
Qty: 90 TABLET | Refills: 1 | Status: SHIPPED | OUTPATIENT
Start: 2023-08-25

## 2023-11-15 ENCOUNTER — OFFICE VISIT (OUTPATIENT)
Dept: FAMILY MEDICINE CLINIC | Facility: CLINIC | Age: 43
End: 2023-11-15
Payer: COMMERCIAL

## 2023-11-15 VITALS
HEIGHT: 64 IN | SYSTOLIC BLOOD PRESSURE: 126 MMHG | TEMPERATURE: 98 F | DIASTOLIC BLOOD PRESSURE: 78 MMHG | RESPIRATION RATE: 18 BRPM | HEART RATE: 78 BPM | BODY MASS INDEX: 40.97 KG/M2 | WEIGHT: 240 LBS | OXYGEN SATURATION: 98 %

## 2023-11-15 DIAGNOSIS — Z00.00 ROUTINE GENERAL MEDICAL EXAMINATION AT A HEALTH CARE FACILITY: Primary | ICD-10-CM

## 2023-11-15 DIAGNOSIS — E66.01 CLASS 3 SEVERE OBESITY DUE TO EXCESS CALORIES WITHOUT SERIOUS COMORBIDITY WITH BODY MASS INDEX (BMI) OF 40.0 TO 44.9 IN ADULT (HCC): ICD-10-CM

## 2023-11-15 DIAGNOSIS — Z12.83 SKIN CANCER SCREENING: ICD-10-CM

## 2023-11-15 DIAGNOSIS — I10 ESSENTIAL HYPERTENSION: ICD-10-CM

## 2023-11-15 PROBLEM — O99.210 OBESITY COMPLICATING PREGNANCY: Status: RESOLVED | Noted: 2022-04-27 | Resolved: 2023-11-15

## 2023-11-15 PROBLEM — R74.8 ELEVATED LIVER ENZYMES: Status: RESOLVED | Noted: 2022-11-11 | Resolved: 2023-11-15

## 2023-11-15 PROBLEM — O99.210 OBESITY COMPLICATING PREGNANCY (HCC): Status: RESOLVED | Noted: 2022-04-27 | Resolved: 2023-11-15

## 2023-11-15 RX ORDER — PHENTERMINE HYDROCHLORIDE 37.5 MG/1
37.5 TABLET ORAL
Qty: 90 TABLET | Refills: 1 | Status: SHIPPED | OUTPATIENT
Start: 2023-11-15

## 2023-11-15 RX ORDER — NIFEDIPINE 30 MG/1
30 TABLET, FILM COATED, EXTENDED RELEASE ORAL DAILY
Qty: 90 TABLET | Refills: 3 | Status: SHIPPED | OUTPATIENT
Start: 2023-11-15

## 2023-11-15 NOTE — PROGRESS NOTES
Subjective:   Patient ID: Chelle Singh is a 37year old female. HPI  Patient presents today requesting physical exam.      Diet: trying to cut down her carb intake, reduced soda to 1 per day, cutting portion sizes, she is snacking less  Water intake: could be better  Exercise: no regular regimen, has a treadmill at home  Sleep is pretty good now that she is using CPAP  Tobacco use: denies  Drug use: denies  Alcohol use: occasionally  Sexually active: yes  LMP: regular periods  Has 3 children - ages 13, 8, and 1 year  Occupation: finance, customer service     Health maintenance:  Pap/Hpv: 12/29/2020 negative  Mammogram: 3/30/23, mom with h/o breast cancer  Performs SBEs: yes  Dexa scan: never  Colonoscopy: 3/14/2017 diverticulosis, hemorrhoids, recall age 39  Discussed age appropriate vaccines    H/o NICK, seeing sleep specialist  Had settings adjusted at her most recent visit  Would like to try a different style of mask for better comfort    Down 16 lbs in the last 2 months  Taking phentermine 37.5 mg 1/2 tab daily  No side effects on medication      History/Other:   Review of Systems   Constitutional:  Negative for chills, fatigue and fever. HENT:  Negative for congestion, ear pain, rhinorrhea and sore throat. Eyes:  Negative for visual disturbance. Respiratory:  Negative for cough, shortness of breath and wheezing. Cardiovascular:  Negative for chest pain, palpitations and leg swelling. Gastrointestinal:  Negative for abdominal pain, diarrhea, nausea and vomiting. Genitourinary:  Negative for dysuria, frequency and hematuria. Musculoskeletal:  Negative for arthralgias, gait problem and myalgias. Skin:  Negative for rash. Neurological:  Negative for weakness, light-headedness and headaches. Hematological:  Negative for adenopathy. Psychiatric/Behavioral:  Negative for dysphoric mood. The patient is not nervous/anxious.       Current Outpatient Medications   Medication Sig Dispense Refill    NIFEdipine ER 30 MG Oral Tablet 24 Hr Take 1 tablet (30 mg total) by mouth daily. 90 tablet 1    Phentermine HCl 37.5 MG Oral Tab Take 1 tablet (37.5 mg total) by mouth every morning before breakfast. 30 tablet 2    methocarbamol 750 MG Oral Tab Take 1 tablet (750 mg total) by mouth 4 (four) times daily. 30 tablet 0    Multiple Vitamins-Minerals (MULTI FOR HER) Oral Tab Take by mouth. Ergocalciferol (VITAMIN D OR) Take by mouth. Allergies:No Known Allergies    Objective:   Physical Exam  Vitals and nursing note reviewed. Constitutional:       General: She is not in acute distress. Appearance: Normal appearance. She is well-developed. HENT:      Head: Normocephalic and atraumatic. Right Ear: Tympanic membrane, ear canal and external ear normal.      Left Ear: Tympanic membrane, ear canal and external ear normal.      Nose: Nose normal.      Mouth/Throat:      Mouth: Mucous membranes are moist.   Eyes:      Extraocular Movements: Extraocular movements intact. Conjunctiva/sclera: Conjunctivae normal.      Pupils: Pupils are equal, round, and reactive to light. Neck:      Thyroid: No thyromegaly. Cardiovascular:      Rate and Rhythm: Normal rate and regular rhythm. Pulses: Normal pulses. Heart sounds: Normal heart sounds. Pulmonary:      Effort: Pulmonary effort is normal.      Breath sounds: Normal breath sounds. No wheezing or rales. Abdominal:      General: Bowel sounds are normal. There is no distension. Palpations: Abdomen is soft. There is no mass. Tenderness: There is no abdominal tenderness. Musculoskeletal:         General: No tenderness. Normal range of motion. Cervical back: Normal range of motion and neck supple. Lymphadenopathy:      Cervical: No cervical adenopathy. Skin:     General: Skin is warm and dry.       Comments: Multiple new hyperpigmented nevi of the face and upper chest/neck   Neurological:      General: No focal deficit present. Mental Status: She is alert and oriented to person, place, and time. Psychiatric:         Mood and Affect: Mood normal.         Behavior: Behavior normal.         Assessment & Plan:   1. Routine general medical examination at a health care facility  Patient here with active issues as below. Check fasting labs. Health maintenance issues discussed. Encouraged routine vaccines. Advised healthy diet and regular exercise. Annual physicals. - CBC With Differential With Platelet  - Comp Metabolic Panel (14)  - Lipid Panel  - TSH W Reflex To Free T4  - Vitamin B12  - Hemoglobin A1C  - VITAMIN D, 25-HYDROXY [37735][Q]  - IRON AND TIBC [7573] [Q]  - FERRITIN [457] [Q]    2. Class 3 severe obesity due to excess calories without serious comorbidity with body mass index (BMI) of 40.0 to 44.9 in adult [facility])  Down 16 lbs and doing very well. Will continue same dose of medication. Continue working on diet modifications. Start exercising, walking 5-10 minutes/day. Increase water intake. Follow up in 3-6 months. Sooner prn.   - Phentermine HCl 37.5 MG Oral Tab; Take 1 tablet (37.5 mg total) by mouth every morning before breakfast.  Dispense: 90 tablet; Refill: 1    3. Essential hypertension  Well controlled. Continue same dose of medication. Check labs. Follow up in 6 months. Sooner prn.   - NIFEdipine ER 30 MG Oral Tablet 24 Hr; Take 1 tablet (30 mg total) by mouth daily. Dispense: 90 tablet; Refill: 3    4.  Skin cancer screening  - Derm Referral - In Network

## 2023-11-17 ENCOUNTER — DOCUMENTATION ONLY (OUTPATIENT)
Dept: FAMILY MEDICINE CLINIC | Facility: CLINIC | Age: 43
End: 2023-11-17

## 2023-11-17 LAB
% SATURATION: 11 % (CALC) (ref 16–45)
ABSOLUTE BASOPHILS: 39 CELLS/UL (ref 0–200)
ABSOLUTE EOSINOPHILS: 49 CELLS/UL (ref 15–500)
ABSOLUTE LYMPHOCYTES: 2136 CELLS/UL (ref 850–3900)
ABSOLUTE MONOCYTES: 470 CELLS/UL (ref 200–950)
ABSOLUTE NEUTROPHILS: 2205 CELLS/UL (ref 1500–7800)
ALBUMIN/GLOBULIN RATIO: 1.3 (CALC) (ref 1–2.5)
ALBUMIN: 4.3 G/DL (ref 3.6–5.1)
ALKALINE PHOSPHATASE: 126 U/L (ref 31–125)
ALT: 16 U/L (ref 6–29)
AST: 20 U/L (ref 10–30)
AWN TEST REFUSED: NORMAL
BASOPHILS: 0.8 %
BILIRUBIN, TOTAL: 0.4 MG/DL (ref 0.2–1.2)
BUN: 11 MG/DL (ref 7–25)
CALCIUM: 9.5 MG/DL (ref 8.6–10.2)
CARBON DIOXIDE: 29 MMOL/L (ref 20–32)
CHLORIDE: 102 MMOL/L (ref 98–110)
CHOL/HDLC RATIO: 4.5 (CALC)
CHOLESTEROL, TOTAL: 200 MG/DL
CREATININE: 0.78 MG/DL (ref 0.5–0.99)
EGFR: 97 ML/MIN/1.73M2
EOSINOPHILS: 1 %
FERRITIN: 52 NG/ML (ref 16–232)
GLOBULIN: 3.2 G/DL (CALC) (ref 1.9–3.7)
GLUCOSE: 88 MG/DL (ref 65–99)
HDL CHOLESTEROL: 44 MG/DL
HEMATOCRIT: 35.1 % (ref 35–45)
HEMOGLOBIN A1C: 5.6 % OF TOTAL HGB
HEMOGLOBIN: 11.6 G/DL (ref 11.7–15.5)
IRON BINDING CAPACITY: 327 MCG/DL (CALC) (ref 250–450)
IRON, TOTAL: 35 MCG/DL (ref 40–190)
LDL-CHOLESTEROL: 136 MG/DL (CALC)
LYMPHOCYTES: 43.6 %
MCH: 28.1 PG (ref 27–33)
MCHC: 33 G/DL (ref 32–36)
MCV: 85 FL (ref 80–100)
MONOCYTES: 9.6 %
MPV: 11.7 FL (ref 7.5–12.5)
NEUTROPHILS: 45 %
NON-HDL CHOLESTEROL: 156 MG/DL (CALC)
PLATELET COUNT: 366 THOUSAND/UL (ref 140–400)
POTASSIUM: 3.8 MMOL/L (ref 3.5–5.3)
PROTEIN, TOTAL: 7.5 G/DL (ref 6.1–8.1)
RDW: 15 % (ref 11–15)
RED BLOOD CELL COUNT: 4.13 MILLION/UL (ref 3.8–5.1)
SODIUM: 139 MMOL/L (ref 135–146)
TRIGLYCERIDES: 102 MG/DL
TSH W/REFLEX TO FT4: 1.25 MIU/L
VITAMIN B12: 548 PG/ML (ref 200–1100)
WHITE BLOOD CELL COUNT: 4.9 THOUSAND/UL (ref 3.8–10.8)

## 2023-11-21 ENCOUNTER — TELEPHONE (OUTPATIENT)
Dept: FAMILY MEDICINE CLINIC | Facility: CLINIC | Age: 43
End: 2023-11-21

## 2023-11-21 NOTE — TELEPHONE ENCOUNTER
Pt saw LR last week. LR was completing a form for her. She wants to know the status and if a copy can be emailed to her at [email or web address]

## 2024-03-06 ENCOUNTER — TELEPHONE (OUTPATIENT)
Dept: FAMILY MEDICINE CLINIC | Facility: CLINIC | Age: 44
End: 2024-03-06

## 2024-03-06 DIAGNOSIS — Z12.31 VISIT FOR SCREENING MAMMOGRAM: Primary | ICD-10-CM

## 2024-04-06 ENCOUNTER — HOSPITAL ENCOUNTER (OUTPATIENT)
Dept: MAMMOGRAPHY | Age: 44
Discharge: HOME | End: 2024-04-06
Attending: PHYSICIAN ASSISTANT
Payer: COMMERCIAL

## 2024-04-06 DIAGNOSIS — Z12.31 VISIT FOR SCREENING MAMMOGRAM: ICD-10-CM

## 2024-04-06 PROCEDURE — 77067 SCR MAMMO BI INCL CAD: CPT | Performed by: PHYSICIAN ASSISTANT

## 2024-04-06 PROCEDURE — 77063 BREAST TOMOSYNTHESIS BI: CPT | Performed by: PHYSICIAN ASSISTANT

## 2024-09-12 DIAGNOSIS — E66.01 CLASS 3 SEVERE OBESITY DUE TO EXCESS CALORIES WITHOUT SERIOUS COMORBIDITY WITH BODY MASS INDEX (BMI) OF 40.0 TO 44.9 IN ADULT (HCC): ICD-10-CM

## 2024-09-13 RX ORDER — PHENTERMINE HYDROCHLORIDE 37.5 MG/1
37.5 TABLET ORAL
Qty: 90 TABLET | Refills: 1 | Status: SHIPPED | OUTPATIENT
Start: 2024-09-13

## 2024-09-13 NOTE — TELEPHONE ENCOUNTER
A refill request was received for:  Requested Prescriptions     Pending Prescriptions Disp Refills    Phentermine HCl 37.5 MG Oral Tab 90 tablet 1     Sig: Take 1 tablet (37.5 mg total) by mouth every morning before breakfast.       Last refill date:   11/15/2023    Last office visit: 11/15/2023    Follow up due:  Future Appointments   Date Time Provider Department Center   11/20/2024 11:00 AM Chrissie Bettencourt PA-C [facility] [address] [facility] [address]

## 2024-11-20 ENCOUNTER — OFFICE VISIT (OUTPATIENT)
Dept: FAMILY MEDICINE CLINIC | Facility: CLINIC | Age: 44
End: 2024-11-20
Payer: COMMERCIAL

## 2024-11-20 DIAGNOSIS — F43.23 ADJUSTMENT DISORDER WITH MIXED ANXIETY AND DEPRESSED MOOD: ICD-10-CM

## 2024-11-20 DIAGNOSIS — I10 ESSENTIAL HYPERTENSION: ICD-10-CM

## 2024-11-20 DIAGNOSIS — Z80.3 FAMILY HISTORY OF BREAST CANCER IN MOTHER: ICD-10-CM

## 2024-11-20 DIAGNOSIS — E66.813 CLASS 3 SEVERE OBESITY DUE TO EXCESS CALORIES WITHOUT SERIOUS COMORBIDITY WITH BODY MASS INDEX (BMI) OF 40.0 TO 44.9 IN ADULT (HCC): ICD-10-CM

## 2024-11-20 DIAGNOSIS — E66.01 CLASS 3 SEVERE OBESITY DUE TO EXCESS CALORIES WITHOUT SERIOUS COMORBIDITY WITH BODY MASS INDEX (BMI) OF 40.0 TO 44.9 IN ADULT (HCC): ICD-10-CM

## 2024-11-20 DIAGNOSIS — Z63.4 BEREAVEMENT: ICD-10-CM

## 2024-11-20 DIAGNOSIS — Z12.31 VISIT FOR SCREENING MAMMOGRAM: ICD-10-CM

## 2024-11-20 DIAGNOSIS — Z00.00 ROUTINE GENERAL MEDICAL EXAMINATION AT A HEALTH CARE FACILITY: Primary | ICD-10-CM

## 2024-11-20 DIAGNOSIS — Z12.4 CERVICAL CANCER SCREENING: ICD-10-CM

## 2024-11-20 PROCEDURE — 99396 PREV VISIT EST AGE 40-64: CPT | Performed by: PHYSICIAN ASSISTANT

## 2024-11-20 RX ORDER — PHENTERMINE HYDROCHLORIDE 37.5 MG/1
37.5 TABLET ORAL
Qty: 90 TABLET | Refills: 1 | Status: SHIPPED | OUTPATIENT
Start: 2024-11-20

## 2024-11-20 RX ORDER — NIFEDIPINE 30 MG
30 TABLET, EXTENDED RELEASE ORAL DAILY
Qty: 90 TABLET | Refills: 3 | Status: SHIPPED | OUTPATIENT
Start: 2024-11-20

## 2024-11-20 SDOH — SOCIAL STABILITY - SOCIAL INSECURITY: DISSAPEARANCE AND DEATH OF FAMILY MEMBER: Z63.4

## 2024-11-20 NOTE — PROGRESS NOTES
Subjective:   Patient ID: Alejo Henderson is a 44 year old female.    HPI  Patient presents today requesting physical exam.      Diet: trying to cut down her carb intake, reduced soda to 1 per day, cutting portion sizes, she is snacking less  Water intake: could be better  Exercise: no regular regimen, has a treadmill at home  Sleep: about 6 hours/night, she is using CPAP  Tobacco use: denies  Drug use: denies  Alcohol use: occasionally  Sexually active: yes  LMP: regular periods  Has 3 children - ages 15, 10, and 1 year  Occupation: finance, customer service     Health maintenance:  Pap/Hpv: 12/29/2020 negative  Mammogram: 3/30/23, mom with h/o breast cancer  Performs SBEs: yes  Dexa scan: never  Colonoscopy: 3/14/2017 diverticulosis, hemorrhoids, recall age 45  Discussed age appropriate vaccines     H/o NICK, seeing sleep specialist  Had settings adjusted at her most recent visit  Would like to try a different style of mask for better comfort     Down 16 lbs in the last 2 months  Taking phentermine 37.5 mg 1/2 tab daily  No side effects on medication    Her mother passed away 10/1/2024  Has been very difficult since      History/Other:   Review of Systems   Constitutional:  Negative for chills, fatigue and fever.   HENT:  Negative for congestion, ear pain, rhinorrhea and sore throat.    Eyes:  Negative for visual disturbance.   Respiratory:  Negative for cough, shortness of breath and wheezing.    Cardiovascular:  Negative for chest pain, palpitations and leg swelling.   Gastrointestinal:  Negative for abdominal pain, diarrhea, nausea and vomiting.   Genitourinary:  Negative for dysuria, frequency and hematuria.   Musculoskeletal:  Negative for arthralgias, gait problem and myalgias.   Skin:  Negative for rash.   Neurological:  Negative for weakness, light-headedness and headaches.   Hematological:  Negative for adenopathy.   Psychiatric/Behavioral:  Positive for decreased concentration, dysphoric mood and sleep  disturbance. The patient is nervous/anxious.      Current Outpatient Medications   Medication Sig Dispense Refill    Phentermine HCl 37.5 MG Oral Tab Take 1 tablet (37.5 mg total) by mouth every morning before breakfast. 90 tablet 1    NIFEdipine ER 30 MG Oral Tablet 24 Hr Take 1 tablet (30 mg total) by mouth daily. 90 tablet 3    Multiple Vitamins-Minerals (MULTI FOR HER) Oral Tab Take by mouth.      Ergocalciferol (VITAMIN D OR) Take by mouth.       Allergies:Allergies[1]    Objective:   Physical Exam  Vitals and nursing note reviewed.   Constitutional:       Appearance: Normal appearance. She is well-developed.   HENT:      Head: Normocephalic and atraumatic.      Right Ear: Tympanic membrane, ear canal and external ear normal.      Left Ear: Tympanic membrane, ear canal and external ear normal.      Nose: Nose normal.      Mouth/Throat:      Mouth: Mucous membranes are moist.   Eyes:      Extraocular Movements: Extraocular movements intact.      Conjunctiva/sclera: Conjunctivae normal.      Pupils: Pupils are equal, round, and reactive to light.   Neck:      Thyroid: No thyromegaly.   Cardiovascular:      Rate and Rhythm: Normal rate and regular rhythm.      Pulses: Normal pulses.      Heart sounds: Normal heart sounds.   Pulmonary:      Effort: Pulmonary effort is normal.      Breath sounds: Normal breath sounds. No wheezing or rales.   Chest:   Breasts:     Breasts are symmetrical.      Right: No swelling, bleeding, inverted nipple, mass, nipple discharge, skin change or tenderness.      Left: No swelling, bleeding, inverted nipple, mass, nipple discharge, skin change or tenderness.   Abdominal:      General: Bowel sounds are normal. There is no distension.      Palpations: Abdomen is soft. There is no mass.      Tenderness: There is no abdominal tenderness. There is no guarding or rebound.   Genitourinary:     Vagina: Normal.      Cervix: Normal. No cervical motion tenderness.      Uterus: Normal.        Adnexa: Right adnexa normal and left adnexa normal.        Right: No mass, tenderness or fullness.          Left: No mass, tenderness or fullness.     Musculoskeletal:         General: No tenderness. Normal range of motion.      Cervical back: Normal range of motion and neck supple.   Lymphadenopathy:      Cervical: No cervical adenopathy.   Skin:     General: Skin is warm and dry.   Neurological:      General: No focal deficit present.      Mental Status: She is alert and oriented to person, place, and time.      Cranial Nerves: No cranial nerve deficit.   Psychiatric:         Mood and Affect: Mood is depressed. Affect is tearful.         Behavior: Behavior is withdrawn.         Assessment & Plan:   1. Routine general medical examination at a health care facility  Patient is generally healthy.  Physical exam is unremarkable.  Check fasting labs.  Health maintenance issues discussed. Encouraged routine vaccines.  Advised healthy diet and regular exercise.  Annual physicals  - CBC With Differential With Platelet  - Comp Metabolic Panel (14)  - Lipid Panel  - Hemoglobin A1C  - TSH W Reflex To Free T4  - Ferritin [E]    2. Cervical cancer screening  - THINPREP TIS AND HPV MRNA E6/E7 [22923] [Q]    3. Visit for screening mammogram  - [facility] LAURA 2D+3D SCREENING BILAT (CPT=77067/94661); Future    4. Family history of breast cancer in mother  - MRI BREAST SCREENING (W+WO) W/CAD BILAT (CPT=77049); Future    5. Class 3 severe obesity due to excess calories without serious comorbidity with body mass index (BMI) of 40.0 to 44.9 in adult (HCC)  Continue medication as directed. Refill given. Continue working on diet and exercise. Follow up in 6 months. Ron green.   - Phentermine HCl 37.5 MG Oral Tab; Take 1 tablet (37.5 mg total) by mouth every morning before breakfast.  Dispense: 90 tablet; Refill: 1    6. Essential hypertension  Reasonably controlled. Cpm. Refills given. Recheck labs.   - NIFEdipine ER 30 MG Oral Tablet 24 Hr;  Take 1 tablet (30 mg total) by mouth daily.  Dispense: 90 tablet; Refill: 3    7. Adjustment disorder with mixed anxiety and depressed mood  8. Bereavement  Lengthy discussion regarding FABIEN from work. Will be off from 12/1/24 through 1/6/25 and then intermittent leave thereafter prn. Forms completed. Follow up in 4 weeks. Sooner prn.             [1] No Known Allergies

## 2024-11-22 ENCOUNTER — PATIENT MESSAGE (OUTPATIENT)
Dept: FAMILY MEDICINE CLINIC | Facility: CLINIC | Age: 44
End: 2024-11-22

## 2024-11-24 VITALS
HEART RATE: 96 BPM | OXYGEN SATURATION: 98 % | SYSTOLIC BLOOD PRESSURE: 130 MMHG | HEIGHT: 64 IN | RESPIRATION RATE: 16 BRPM | DIASTOLIC BLOOD PRESSURE: 80 MMHG | BODY MASS INDEX: 40.46 KG/M2 | WEIGHT: 237 LBS

## 2024-11-26 LAB — HPV MRNA E6/E7: NOT DETECTED

## 2024-11-27 RX ORDER — DOXYCYCLINE 100 MG/1
100 CAPSULE ORAL 2 TIMES DAILY
Qty: 20 CAPSULE | Refills: 0 | Status: SHIPPED | OUTPATIENT
Start: 2024-11-27

## 2024-11-28 NOTE — TELEPHONE ENCOUNTER
I will send doxycycline. FMLA forms completed. Given to sean on 11/25/24 to fax and scan. Extra copy on my desk we can give to patient. I think one was left at front for her as well.

## 2024-12-13 LAB
ABSOLUTE BASOPHILS: 49 CELLS/UL (ref 0–200)
ABSOLUTE EOSINOPHILS: 110 CELLS/UL (ref 15–500)
ABSOLUTE LYMPHOCYTES: 2233 CELLS/UL (ref 850–3900)
ABSOLUTE MONOCYTES: 659 CELLS/UL (ref 200–950)
ABSOLUTE NEUTROPHILS: 3050 CELLS/UL (ref 1500–7800)
ALBUMIN/GLOBULIN RATIO: 1.4 (CALC) (ref 1–2.5)
ALBUMIN: 4 G/DL (ref 3.6–5.1)
ALKALINE PHOSPHATASE: 119 U/L (ref 31–125)
ALT: 7 U/L (ref 6–29)
AST: 13 U/L (ref 10–30)
BASOPHILS: 0.8 %
BILIRUBIN, TOTAL: 0.4 MG/DL (ref 0.2–1.2)
BUN: 13 MG/DL (ref 7–25)
CALCIUM: 8.8 MG/DL (ref 8.6–10.2)
CARBON DIOXIDE: 27 MMOL/L (ref 20–32)
CHLORIDE: 103 MMOL/L (ref 98–110)
CHOL/HDLC RATIO: 4.3 (CALC)
CHOLESTEROL, TOTAL: 211 MG/DL
CREATININE: 0.75 MG/DL (ref 0.5–0.99)
EGFR: 101 ML/MIN/1.73M2
EOSINOPHILS: 1.8 %
FERRITIN: 26 NG/ML (ref 16–232)
GLOBULIN: 2.9 G/DL (CALC) (ref 1.9–3.7)
GLUCOSE: 82 MG/DL (ref 65–99)
HDL CHOLESTEROL: 49 MG/DL
HEMATOCRIT: 35.4 % (ref 35–45)
HEMOGLOBIN A1C: 5.8 % OF TOTAL HGB
HEMOGLOBIN: 11.3 G/DL (ref 11.7–15.5)
LDL-CHOLESTEROL: 142 MG/DL (CALC)
LYMPHOCYTES: 36.6 %
MCH: 27.5 PG (ref 27–33)
MCHC: 31.9 G/DL (ref 32–36)
MCV: 86.1 FL (ref 80–100)
MONOCYTES: 10.8 %
MPV: 12.1 FL (ref 7.5–12.5)
NEUTROPHILS: 50 %
NON-HDL CHOLESTEROL: 162 MG/DL (CALC)
PLATELET COUNT: 372 THOUSAND/UL (ref 140–400)
POTASSIUM: 4 MMOL/L (ref 3.5–5.3)
PROTEIN, TOTAL: 6.9 G/DL (ref 6.1–8.1)
RDW: 14.6 % (ref 11–15)
RED BLOOD CELL COUNT: 4.11 MILLION/UL (ref 3.8–5.1)
SODIUM: 139 MMOL/L (ref 135–146)
TRIGLYCERIDES: 96 MG/DL
TSH W/REFLEX TO FT4: 1.04 MIU/L
WHITE BLOOD CELL COUNT: 6.1 THOUSAND/UL (ref 3.8–10.8)

## 2025-01-07 ENCOUNTER — TELEPHONE (OUTPATIENT)
Dept: FAMILY MEDICINE CLINIC | Facility: CLINIC | Age: 45
End: 2025-01-07

## 2025-01-07 NOTE — TELEPHONE ENCOUNTER
Patient following up on the ibuprofen prescription that her and LR spoke about possibly prescribing to her. Please advise.     Also, Patient started her first mounjaro injection, just wanted to update Fabiola.     Patient says you can respond to her in a [employer] message. Please advise.

## 2025-01-16 DIAGNOSIS — E88.810 ABDOMINAL OBESITY AND METABOLIC SYNDROME: ICD-10-CM

## 2025-01-16 DIAGNOSIS — E66.01 CLASS 3 SEVERE OBESITY DUE TO EXCESS CALORIES WITHOUT SERIOUS COMORBIDITY WITH BODY MASS INDEX (BMI) OF 40.0 TO 44.9 IN ADULT (HCC): ICD-10-CM

## 2025-01-16 DIAGNOSIS — E66.813 CLASS 3 SEVERE OBESITY DUE TO EXCESS CALORIES WITHOUT SERIOUS COMORBIDITY WITH BODY MASS INDEX (BMI) OF 40.0 TO 44.9 IN ADULT (HCC): ICD-10-CM

## 2025-01-16 DIAGNOSIS — E66.9 ABDOMINAL OBESITY AND METABOLIC SYNDROME: ICD-10-CM

## 2025-01-16 DIAGNOSIS — R73.03 PREDIABETES: ICD-10-CM

## 2025-01-16 RX ORDER — TIRZEPATIDE 2.5 MG/.5ML
2 INJECTION, SOLUTION SUBCUTANEOUS WEEKLY
Qty: 2 ML | Refills: 0 | Status: SHIPPED | OUTPATIENT
Start: 2025-01-16

## 2025-01-16 NOTE — TELEPHONE ENCOUNTER
A refill request was received for:  Requested Prescriptions     Pending Prescriptions Disp Refills    Tirzepatide (MOUNJARO) 2.5 MG/0.5ML Subcutaneous Solution Auto-injector 2 mL 0     Sig: Inject 2 mg into the skin once a week.       Last refill date:12/2024     Will be due for one month follow up per LR Office note-message sent  Last office visit:12/2024     Follow up due:  Future Appointments   Date Time Provider Department Center   4/12/2025  7:40 AM [address]

## 2025-01-19 DIAGNOSIS — E66.813 CLASS 3 SEVERE OBESITY DUE TO EXCESS CALORIES WITHOUT SERIOUS COMORBIDITY WITH BODY MASS INDEX (BMI) OF 40.0 TO 44.9 IN ADULT (HCC): ICD-10-CM

## 2025-01-19 DIAGNOSIS — E66.01 CLASS 3 SEVERE OBESITY DUE TO EXCESS CALORIES WITHOUT SERIOUS COMORBIDITY WITH BODY MASS INDEX (BMI) OF 40.0 TO 44.9 IN ADULT (HCC): ICD-10-CM

## 2025-01-19 DIAGNOSIS — R73.03 PREDIABETES: ICD-10-CM

## 2025-01-19 DIAGNOSIS — E88.810 ABDOMINAL OBESITY AND METABOLIC SYNDROME: ICD-10-CM

## 2025-01-19 DIAGNOSIS — E66.9 ABDOMINAL OBESITY AND METABOLIC SYNDROME: ICD-10-CM

## 2025-01-19 RX ORDER — TIRZEPATIDE 2.5 MG/.5ML
2 INJECTION, SOLUTION SUBCUTANEOUS WEEKLY
Qty: 2 ML | Refills: 0 | Status: CANCELLED | OUTPATIENT
Start: 2025-01-19

## 2025-01-31 ENCOUNTER — TELEMEDICINE (OUTPATIENT)
Dept: FAMILY MEDICINE CLINIC | Facility: CLINIC | Age: 45
End: 2025-01-31
Payer: COMMERCIAL

## 2025-01-31 DIAGNOSIS — G47.33 OSA ON CPAP: Primary | ICD-10-CM

## 2025-01-31 DIAGNOSIS — E88.810 ABDOMINAL OBESITY AND METABOLIC SYNDROME: ICD-10-CM

## 2025-01-31 DIAGNOSIS — E66.813 CLASS 3 SEVERE OBESITY DUE TO EXCESS CALORIES WITHOUT SERIOUS COMORBIDITY WITH BODY MASS INDEX (BMI) OF 40.0 TO 44.9 IN ADULT (HCC): ICD-10-CM

## 2025-01-31 DIAGNOSIS — R73.03 PREDIABETES: ICD-10-CM

## 2025-01-31 DIAGNOSIS — E66.01 CLASS 3 SEVERE OBESITY DUE TO EXCESS CALORIES WITHOUT SERIOUS COMORBIDITY WITH BODY MASS INDEX (BMI) OF 40.0 TO 44.9 IN ADULT (HCC): ICD-10-CM

## 2025-01-31 DIAGNOSIS — E66.9 ABDOMINAL OBESITY AND METABOLIC SYNDROME: ICD-10-CM

## 2025-01-31 PROCEDURE — 98006 SYNCH AUDIO-VIDEO EST MOD 30: CPT | Performed by: PHYSICIAN ASSISTANT

## 2025-01-31 RX ORDER — TIRZEPATIDE 2.5 MG/.5ML
2.5 INJECTION, SOLUTION SUBCUTANEOUS WEEKLY
Qty: 6 ML | Refills: 0 | Status: SHIPPED | OUTPATIENT
Start: 2025-01-31

## 2025-01-31 NOTE — PROGRESS NOTES
Video Visit    Alejo Henderson is a 44 year old female.  No chief complaint on file.      HPI:   Patient presents for follow up of weight management and medication  At the last visit we started her on mounjaro 2.5 mg weekly  Tolerating very well and happy on medication  Only had very mild nausea but manageable  Started first dose on 1/3/25 and she was at 245 lb  Now down to 234-236 lb  Helping with appetite control, portion sizes are better  She is trying to stay active and drink plenty of water  Would like to continue treatment.    Current Outpatient Medications   Medication Sig Dispense Refill    Tirzepatide-Weight Management (ZEPBOUND) 2.5 MG/0.5ML Subcutaneous Solution Auto-injector Inject 2.5 mg into the skin once a week. 6 mL 0    ibuprofen 800 MG Oral Tab Take 1 tablet (800 mg total) by mouth every 8 (eight) hours as needed for Pain. 60 tablet 2    NIFEdipine ER 30 MG Oral Tablet 24 Hr Take 1 tablet (30 mg total) by mouth daily. 90 tablet 3    Multiple Vitamins-Minerals (MULTI FOR HER) Oral Tab Take by mouth.      Ergocalciferol (VITAMIN D OR) Take by mouth.        Past Medical History:    Anemia    adult onset    Anxiety    Back problem    Depression    Family therapy    Genital warts    History of chicken pox        History of pregnancy    SAB,  x2    Hypertension    Pre-eclampsia superimposed on chronic hypertension, postpartum (HCC)    Pre-existing essential hypertension during pregnancy (HCC)    22  Dr Griffith ordered baseline labs showing elevated urine protein and creatinine. Plan Nephrology consult- Dr Arellano Labetalol 200 mg BID Baseline labs: [ x ] 24 hr urine protein- 320 mg Pr [ x ] CMP  [  ] Baby aspirin [  ] BP log bid or tid- to fax in weekly  [  ] Serial growth at [facility] [  ] Weekly NST at 32 weeks If ACE-I, thiazide, then [  ]  level 2 u/s and [  ] M consult      Preeclampsia (HCC)    Trichiasis    treated      Past Surgical History:   Procedure Laterality Date    Colonoscopy  N/A 03/14/2017    Procedure: COLONOSCOPY;  Surgeon: Rex Salazar MD;  Location: [facility] ENDOSCOPY    Needle biopsy left Left 03/2021    us guided bx, benign.      Social History:  Social History     Socioeconomic History    Marital status:    Tobacco Use    Smoking status: Never    Smokeless tobacco: Never   Vaping Use    Vaping status: Never Used   Substance and Sexual Activity    Alcohol use: Not Currently     Comment: occasionally when not pregnant    Drug use: No    Sexual activity: Yes     Partners: Male     Birth control/protection: OCP     Comment: same partner   Other Topics Concern    Caffeine Concern Yes     Comment: 2 cans soda per day   Social History Narrative    Akira is  to Evangelista x 11 yrs. Mother of 2 children. 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 Patient lives with her  and kids in [place], [place].        REVIEW OF SYSTEMS:   GENERAL HEALTH: Denies fevers, chills, sweats, and fatigue.  EYES: Denies vision changes, eye redness, itching, or drainage.   HENT: Denies hearing loss, ear pain, nasal congestion, sinus pain, and sore throat.  SKIN: Denies skin lesions and rashes.  RESPIRATORY: Denies shortness of breath, wheezing, and cough.  CARDIOVASCULAR: Denies chest pain, palpitations, and edema.  GI: Denies abdominal pain, heartburn, nausea, vomiting, and diarrhea.  : Denies dysuria, hematuria, urinary frequency, change urine color, and discharge.  MUSK: Denies back pain, joint pain, neck pain, and myalgias.  NEURO: Denies numbness/tingling, weakness, and headaches  ENDO: Denies polyuria, polydipsia, and tremors.  ALLERGY/ASTHMA: Denies asthma and environmental allergies  HEME: Denies anemia, abnormal bleeding, and easy bruising.  PSYCH: Denies anxiety and depression.      EXAM:   GENERAL: well developed, well nourished, NAD.  HEENT: AT/NC. Normal lips, gums, teeth, tongue.   LUNGS: Speaking in complete sentences comfortably without increased work of breathing.  SKIN:  normal mobility and turgor. No rashes or suspicious lesions.   PSYCH: Normal mood and affect, A&Ox3.      ASSESSMENT AND PLAN:   1. Class 3 severe obesity due to excess calories without serious comorbidity with body mass index (BMI) of 40.0 to 44.9 in adult (HCC)  2. Prediabetes  3. Abdominal obesity and metabolic syndrome  4. NICK on CPAP  Doing great on medication and weight is down at least 10 lbs. Will continue same dose for now as it is well tolerated. Continue working on diet and exercise. Follow up in 3-6 months to reassess. Sooner prn.  - Tirzepatide-Weight Management (ZEPBOUND) 2.5 MG/0.5ML Subcutaneous Solution Auto-injector; Inject 2.5 mg into the skin once a week.  Dispense: 6 mL; Refill: 0        The patient indicates understanding of these issues and agrees to the plan.    No follow-ups on file.      Fabiola Bettencourt PA-C  1/31/2025  1:24 PM    Telehealth Verbal Consent   I conducted a telehealth visit with Alejo Henderson today, 01/31/25, which was completed using two-way, real-time interactive audio and video communication. This has been done in good [place] to provide continuity of care in the best interest of the provider-patient relationship, due to the COVID -19 public health crisis/national emergency where restrictions of face-to-face office visits are ongoing. Every conscious effort was taken to allow for sufficient and adequate time to complete the visit.  The patient was made aware of the limitations of the telehealth visit, including treatment limitations as no physical exam could be performed.  The patient was advised to call 911 or to go to the ER in case there was an emergency.  The patient was also advised of the potential privacy & security concerns related to the telehealth platform.   The patient was made aware of where to find [facility]'s notice of privacy practices, telehealth consent form and other related consent forms and documents.  which are located on the [facility] website. The patient  verbally agreed to telehealth consent form, related consents and the risks discussed.    Lastly, the patient confirmed that they were in [place].   Included in this visit, time may have been spent reviewing labs, medications, radiology tests and decision making. Appropriate medical decision-making and tests are ordered as detailed in the plan of care above.  Coding/billing information is submitted for this visit based on complexity of care and/or time spent for the visit.

## 2025-02-05 ENCOUNTER — TELEPHONE (OUTPATIENT)
Dept: FAMILY MEDICINE CLINIC | Facility: CLINIC | Age: 45
End: 2025-02-05

## 2025-02-05 DIAGNOSIS — E66.813 CLASS 3 SEVERE OBESITY DUE TO EXCESS CALORIES WITHOUT SERIOUS COMORBIDITY WITH BODY MASS INDEX (BMI) OF 40.0 TO 44.9 IN ADULT: ICD-10-CM

## 2025-02-05 DIAGNOSIS — E88.810 ABDOMINAL OBESITY AND METABOLIC SYNDROME: ICD-10-CM

## 2025-02-05 DIAGNOSIS — E66.9 ABDOMINAL OBESITY AND METABOLIC SYNDROME: ICD-10-CM

## 2025-02-05 DIAGNOSIS — R73.03 PREDIABETES: ICD-10-CM

## 2025-02-05 RX ORDER — TIRZEPATIDE 2.5 MG/.5ML
2 INJECTION, SOLUTION SUBCUTANEOUS WEEKLY
Qty: 6 ML | Refills: 0 | Status: SHIPPED | OUTPATIENT
Start: 2025-02-05 | End: 2025-05-07

## 2025-02-05 NOTE — TELEPHONE ENCOUNTER
See previous message spoke to pharmacist Zepbound not covered by insurance.Mounjaro is.Okay to send Mounjaro? 3 month script more cost effective  Script pended if agreeable.

## 2025-02-05 NOTE — TELEPHONE ENCOUNTER
Denise the Pharmacist from [facility] called stating that the patient stated that the medication she takes: Mounjaro 2.5 mg is more cost affective with a 90 day supply prescription instead of a 30 days through insurance.    Please send 90 days prescription to:[facility] [id number] [place] [address] [phone], [phone]     Please advise

## 2025-02-27 ENCOUNTER — TELEPHONE (OUTPATIENT)
Dept: FAMILY MEDICINE CLINIC | Facility: CLINIC | Age: 45
End: 2025-02-27

## 2025-02-27 DIAGNOSIS — R73.03 PREDIABETES: ICD-10-CM

## 2025-02-27 DIAGNOSIS — E66.01 CLASS 3 SEVERE OBESITY DUE TO EXCESS CALORIES WITHOUT SERIOUS COMORBIDITY WITH BODY MASS INDEX (BMI) OF 40.0 TO 44.9 IN ADULT (HCC): Primary | ICD-10-CM

## 2025-02-27 DIAGNOSIS — E66.813 CLASS 3 SEVERE OBESITY DUE TO EXCESS CALORIES WITHOUT SERIOUS COMORBIDITY WITH BODY MASS INDEX (BMI) OF 40.0 TO 44.9 IN ADULT (HCC): Primary | ICD-10-CM

## 2025-02-27 NOTE — TELEPHONE ENCOUNTER
PA requested, however:  Close reason: Prior Authorization not required for patient/medication   Note from payer: Drug is covered by current benefit plan. No further PA activity needed   Payer: [employer]    [phone]    Okay for wt loss clinic?

## 2025-02-27 NOTE — TELEPHONE ENCOUNTER
Pt calling regarding the lesleyaro   She is having a difficult time filling medication  Please advise

## 2025-03-06 NOTE — TELEPHONE ENCOUNTER
Yes, I recommend weight loss clinic. I am no longer prescribing compounded GLP-1 injections but she can look in to this on her own if she would like.

## 2025-04-12 ENCOUNTER — HOSPITAL ENCOUNTER (OUTPATIENT)
Dept: MAMMOGRAPHY | Age: 45
Discharge: HOME | End: 2025-04-12
Attending: PHYSICIAN ASSISTANT
Payer: COMMERCIAL

## 2025-04-12 DIAGNOSIS — Z12.31 VISIT FOR SCREENING MAMMOGRAM: ICD-10-CM

## 2025-04-12 PROCEDURE — 77063 BREAST TOMOSYNTHESIS BI: CPT | Performed by: PHYSICIAN ASSISTANT

## 2025-04-12 PROCEDURE — 77067 SCR MAMMO BI INCL CAD: CPT | Performed by: PHYSICIAN ASSISTANT

## 2025-05-05 ENCOUNTER — TELEPHONE (OUTPATIENT)
Dept: MAMMOGRAPHY | Facility: HOSPITAL | Age: 45
End: 2025-05-05

## 2025-05-05 ENCOUNTER — HOSPITAL ENCOUNTER (OUTPATIENT)
Dept: MAMMOGRAPHY | Facility: HOSPITAL | Age: 45
Discharge: HOME | End: 2025-05-05
Attending: PHYSICIAN ASSISTANT
Payer: COMMERCIAL

## 2025-05-05 DIAGNOSIS — R92.2 INCONCLUSIVE MAMMOGRAM: ICD-10-CM

## 2025-05-05 PROCEDURE — 76642 ULTRASOUND BREAST LIMITED: CPT | Performed by: PHYSICIAN ASSISTANT

## 2025-05-05 PROCEDURE — 77061 BREAST TOMOSYNTHESIS UNI: CPT | Performed by: PHYSICIAN ASSISTANT

## 2025-05-05 PROCEDURE — 77065 DX MAMMO INCL CAD UNI: CPT | Performed by: PHYSICIAN ASSISTANT

## 2025-05-05 NOTE — TELEPHONE ENCOUNTER
This [facility] RN phoned pt re left breast 1 site ultrasound guided biopsy recommendation by Dr. Santillan for mass.  Procedure reviewed and all questions answered. Confirmed pt did receive educational handouts and reviewed these with the patient  On the day of the biopsy, pt instructed to take Tylenol 1000mg PO, eat a light meal & bring or wear a sports bra. Post biopsy care also reviewed with pt to include NO lifting more than 5lbs, no exercising or housework (limit upper body movement) for 24-48 hrs post biopsy. Patient denies blood thinners, bleeding disorders, liver disease, and chemo. Pt verbalized understanding.  Our breast center schedulers will be calling to schedule an appt that is convenient for pt.

## 2025-05-06 NOTE — PROGRESS NOTES
HISTORY OF PRESENT ILLNESS  Chief Complaint   Patient presents with    Weight Problem     Pcp referral, pt wanting to discuss wt lose medications, but not interested in wt lose surgery.       Alejo Henderson is a 44 year old female new to our office today for initiation of medical weight loss program, referred by PCP.  Patient presents today with c/o excess weight starting at 24 y/o with pregnancy and then increasing ever since.     Reason/goal for weight loss: lose 30# in 6 months and 55# in 1 year.    Previous weight loss efforts in the past: see below. Currently on Phentermine 15 mg daily PRN.    Past 6 months lifestyle interventions: no    Reviewed [facility] patient contract. Readiness for Lifestyle change: 8/10, Interest in Medication: 10/10, Bariatric surgery interest: 0/10.    Barriers to weight loss: emotional eating, always hungry    Wt Readings from Last 6 Encounters:   05/07/25 236 lb (107 kg)   12/27/24 244 lb (110.7 kg)   11/20/24 237 lb (107.5 kg)   11/15/23 240 lb (108.9 kg)   06/09/23 250 lb (113.4 kg)   06/07/23 250 lb (113.4 kg)          Social hx and lifestyle reviewed:    Please respond to the questions regarding your previous weight loss    How did you hear about the [facility]? Primary Care Provider   Previous weight loss efforts in the past/medication(s): Mounjaro and Phentermine dieting   Eating behaviors/patterns that have been barriers to weight loss success in the past: I eat a loth of unhealthy  foods and im always hungry. Seems like most times when im hungry i cant get full.   Please respond to the questions regarding a 24 hour food journal.  Include the average time you ate and the quantity/food preparation method. Often eating out   List foods, qty and prep for breakfast:    List foods, qty and prep for lunch.    List foods, qty and prep for dinner.    List foods, qty and prep for snacks.    List the types and qty of fluids consumed    Please respond to the questions  regarding lifestyle.    Tobacco use: No   Alcohol use: How many servings per week? 1   Supplements taken on a regular basis include:    Please respond to the questions regarding exercise/activity    How many days per week are you active or exercise 0   What type of activities:    Perceived level of exertion on a scale of 1-5, with 5 being very intense: 5   Average stress level on a scale of 1-10, with 10 being extremely stressed: 10- work   How do you cope with stress: I just try to calm down   Please respond to the questions regarding sleep    How many hours of uninterrupted sleep do you get a night: 5   How many times do you wake up in the night: 3   Do you feel rested in the morning: Yes   Do you snore: Yes   Do you have sleep apnea: Yes   Do you use: CPAP     Work: employed from home  Marital status:  with 3 children- youngest is 3 y/o  Support: fair- spouse can bring in FF items    MEDICAL HISTORY  PMH reviewed:   Cardiac disorders: dyslipidemia, HTN- diet controlled  Depression/anxiety: negative  Glaucoma: negative  Kidney stones: negative  Eating disorder: negative  Migraines: negative  Seizures: negative  Joint-related conditions: negative  Liver disease: negative  Renal disease: negative  Diabetes: prediabetes  Thyroid disease: negative  Constipation: negative  Other pertinent hx: n/a  Sleep Apnea hx: NICK on CPAP. Reviewed SS in EMR dated 5/19/23 with AHI 11.5  Cancer hx: negative  Cholecystectomy and/or gallstones: negative  Family or personal history of Pancreatic issues / Medullary Thyroid Cancer/MENS 2: negative  History of bariatric surgery: negative    FMH reviewed    REVIEW OF SYSTEMS  GENERAL: feels well otherwise  SKIN: denies any rashes to skin folds  HEENT: snoring- yes  LUNGS: denies shortness of breath with exertion  CARDIOVASCULAR: denies chest pain on exertion, denies palpitations or pedal edema  GI: denies abdominal pain.  No N/V/D/C  MUSCULOSKELETAL: denies joint pains  NEURO: denies  headaches  PSYCH: denies change in behavior or mood, denies feeling sad or depressed. BED screen- negative.    EXAM    MBSAQIP Information Bariatric Seminar Date: 5/7/25 Patient type: Lifestyle     Initial Body Fat %: 45.4  Today's Body Fat Female: 33.13 Change in Body   Fat %: 12.27   Date of Initial Weight: 05/07/2025 Initial Weight: 236 Today's Weight: 236   Weightloss to Date: 0   Weightloss Percentage: 0 5% Goal: 11.8 10% Goal: 23.6    Initial BMI: 40.5 Today's BMI: 40.5 Change in BMI: 0    Neck Circumference: 13  Waist Circumference Female: 42     Female Fat Mass: 78.19 Female Lean Mass: 157.81      Have any comorbidities improved since the last visit?   Hypertension DM 2 Dyslipidemia Osteoarthritis Sleep Apnea                 /84   Pulse 90   Resp 16   Ht 5' 4\" (1.626 m)   Wt 236 lb (107 kg)   LMP 04/05/2025 (Approximate)   SpO2 97%   BMI 40.51 kg/m² ,   Percent body fat: F >32%: 45.4%, VF: 14. Muscle Mass: 12.4%, WC: 42 inches.  GENERAL: well developed, well nourished, in no apparent distress, morbidly obese  SKIN: warm, pink, dry without rashes to exposed area  EYES: conjunctiva pink, sclera non icteric, PERRLA  HEENT: atraumatic, normocephalic  NECK: supple, non tender, no adenopathy, no thyromegaly  LUNGS: CTA in all fields, breathing non labored  CARDIO: RRR without murmur, normal S1 and S2 without clicks or gallops, no pedal edema. Reviewed EKG in EMR dated 6/10/23.  GI: +BS, soft, no masses, HSM or tenderness  MUSCULOSKELETAL: grossly intact  NEURO: Oriented times three  PSYCH: pleasant, cooperative, normal mood and affect    Lab Results   Component Value Date    WBC 6.1 12/12/2024    RBC 4.11 12/12/2024    HGB 11.3 (L) 12/12/2024    HCT 35.4 12/12/2024    MCV 86.1 12/12/2024    MCH 27.5 12/12/2024    MCHC 31.9 (L) 12/12/2024    RDW 14.6 12/12/2024     12/12/2024    MPV 10.3 09/11/2018     Lab Results   Component Value Date    GLU 82 12/12/2024    BUN 13 12/12/2024    BUNCREA SEE  NOTE: 12/12/2024    CREATSERUM 0.75 12/12/2024    ANIONGAP 3 06/09/2023    GFRNAA 114 05/28/2022    GFRAA 133 05/28/2022    CA 8.8 12/12/2024    OSMOCALC 280 06/09/2023    ALKPHO 119 12/12/2024    AST 13 12/12/2024    ALT 7 12/12/2024    ALKPHOS 83 02/20/2016    BILT 0.4 12/12/2024    TP 6.9 12/12/2024    ALB 4.0 12/12/2024    GLOBULIN 2.9 12/12/2024    AGRATIO 1.4 12/12/2024     12/12/2024    K 4.0 12/12/2024     12/12/2024    CO2 27 12/12/2024     Lab Results   Component Value Date    A1C 5.8 (H) 12/12/2024     Lab Results   Component Value Date    CHOLEST 211 (H) 12/12/2024    TRIG 96 12/12/2024    HDL 49 (L) 12/12/2024     (H) 12/12/2024    VLDL 21 06/04/2021    TCHDLRATIO 4.3 12/12/2024    NONHDLC 162 (H) 12/12/2024    CALCNONHDL 129 02/20/2016     Lab Results   Component Value Date    TSH 1.460 06/04/2021    TSHT4 1.04 12/12/2024     Lab Results   Component Value Date    B12 391 06/04/2021    VITB12 548 11/16/2023     Lab Results   Component Value Date    VITD 15.5 (L) 06/04/2021       Medications Ordered Prior to Encounter[1]    ASSESSMENT  Initial Weight Data and Goal Weight Loss:  Weight Calculations  Initial Weight: 236 lbs  Initial Weight Date: 05/07/25  Today's Weight: 236 lbs  5% Goal: 11.8  10% Goal: 23.6  Total Weight Loss: 0 lbs    Diagnoses and all orders for this visit:    Encounter for therapeutic drug monitoring  -     Vitamin B12; Future  -     VITAMIN D, 25-HYDROXY [[id number]][Q]  -     Tirzepatide-Weight Management (ZEPBOUND) 2.5 MG/0.5ML Subcutaneous Solution Auto-injector; Inject 2.5 mg into the skin once a week.  -     Tirzepatide-Weight Management (ZEPBOUND) 5 MG/0.5ML Subcutaneous Solution Auto-injector; Inject 5 mg into the skin once a week. Start after completing full 4 weeks on Zepbound 2.5 mg weekly dose.    Class 3 severe obesity with serious comorbidity and body mass index (BMI) of 40.0 to 44.9 in adult, unspecified obesity type  - Start Zepbound as directed  -  Reviewed balanced plate nutrition with focus on whole food, regular meals daily that include protein and produce and eliminating/reducing late night eating.  - Counseled on the 4 Pillars of health (sleep, stress, nutrition and fitness).  - Reviewed weight synopsis in EMR  - Instructed to use contraception at all times while on AOMs.  Comments:  Baseline BMI: 43.41 (11/11/22)  Orders:  -     OP REFERRAL TO [facility] ([facility] USE ONLY)  -     Vitamin B12; Future  -     OP REFERRAL TO [facility]  -     VITAMIN D, 25-HYDROXY [[id number]][Q]  -     Tirzepatide-Weight Management (ZEPBOUND) 2.5 MG/0.5ML Subcutaneous Solution Auto-injector; Inject 2.5 mg into the skin once a week.  -     Tirzepatide-Weight Management (ZEPBOUND) 5 MG/0.5ML Subcutaneous Solution Auto-injector; Inject 5 mg into the skin once a week. Start after completing full 4 weeks on Zepbound 2.5 mg weekly dose.    NICK on CPAP  - Start Zepbound as directed  Comments:  SS 5/19/23 NICK with AHI 11.5  Orders:  -     OP REFERRAL TO [facility] ([facility] USE ONLY)  -     Vitamin B12; Future  -     OP REFERRAL TO [facility]  -     VITAMIN D, 25-HYDROXY [[id number]][Q]  -     Tirzepatide-Weight Management (ZEPBOUND) 2.5 MG/0.5ML Subcutaneous Solution Auto-injector; Inject 2.5 mg into the skin once a week.  -     Tirzepatide-Weight Management (ZEPBOUND) 5 MG/0.5ML Subcutaneous Solution Auto-injector; Inject 5 mg into the skin once a week. Start after completing full 4 weeks on Zepbound 2.5 mg weekly dose.    Prediabetes  -     OP REFERRAL TO [facility] ([facility] USE ONLY)  -     Vitamin B12; Future  -     OP REFERRAL TO [facility]  -     VITAMIN D, 25-HYDROXY [[id number]][Q]  -     Tirzepatide-Weight Management (ZEPBOUND) 2.5 MG/0.5ML Subcutaneous Solution Auto-injector; Inject 2.5 mg into the skin once a week.  -     Tirzepatide-Weight Management (ZEPBOUND) 5 MG/0.5ML Subcutaneous Solution Auto-injector; Inject 5 mg into the skin once a week. Start after completing full 4 weeks  on Zepbound 2.5 mg weekly dose.    Dyslipidemia  -     OP REFERRAL TO [facility] ([facility] USE ONLY)  -     Vitamin B12; Future  -     OP REFERRAL TO [facility]  -     VITAMIN D, 25-HYDROXY [[id number]][Q]  -     Tirzepatide-Weight Management (ZEPBOUND) 2.5 MG/0.5ML Subcutaneous Solution Auto-injector; Inject 2.5 mg into the skin once a week.  -     Tirzepatide-Weight Management (ZEPBOUND) 5 MG/0.5ML Subcutaneous Solution Auto-injector; Inject 5 mg into the skin once a week. Start after completing full 4 weeks on Zepbound 2.5 mg weekly dose.    Stress  -     OP REFERRAL TO [facility] ([facility] USE ONLY)  -     Vitamin B12; Future  -     OP REFERRAL TO [facility]  -     VITAMIN D, 25-HYDROXY [[id number]][Q]        PLAN  Medication use and side effects reviewed with patient.  Medication contraindications: none foreseen  Follow up with dietitian and psychologist as recommended.  Discussed the role of sleep and stress in weight management.  Labs orders as above.  Counseled on comprehensive weight loss plan including attention to nutrition, exercise and behavior/stress management for success. See patient instruction below for more details.  Reviewed previous labs in EMR/Care Everywhere  Weight Loss Contract reviewed and signed.    Patient Instructions   Welcome to the [facility]...your Lifestyle Renovation begins now!  Thank you for placing your trust in our health care team, I look forward to working with you along this journey to better health!    Next steps:     1.  Call our office at [phone] to schedule a personal nutrition consultation with one of our registered dieticians, Jatinder or Sheila. Bring along your food journal (3 days minimum). See journal options below.  2.  Complete non fasting labs at [facility] lab site prior to next office visit. Labs printed. Lab results will be communicated via LanternCRM.  3.  Body composition completed today with findings of: Total body fat: 45.4% (goal <32%),  Visceral Fat: 14 (goal <10), Muscle mass: 12.4% (goal >18%), and waist circumference 42 inches (goal <35 inches).  4.  Use contraception at all times while on anti-obesity medications.  5.  Referral for counseling services placed  6.  Fill your prescribed medication and take as discussed and prescribed: Start Zepbound at 2.5 mg weekly. After 4 weeks increase to the next dose of 5 mg weekly. If at a weight plateau for >3 weeks, then send [email or web address] message with current scale weight to determine if a dose adjustment is appropriate. Otherwise plan to maintain this dose until next visit. Any further dose titrations beyond this dose will be considered at appointments only, unless otherwise discussed. Visit the website [email or web address].MojoPages and click on Consumers for additional details, savings, and further dosing instructions. This medication may require a prior authorization (PA) by your insurance. A PA may take one week plus to complete and our office will be in touch during this process if needed. If cost/supply prohibitive plan: Wegovy or consider primary diagnosis for treatment for NICK.    Zepbound is being prescribed for the dual FDA indication for the treatment of Overweight/Obesity and Obstructive Sleep Apnea (NICK).    Tips while taking an injectable medication:    Be an intuitive eater. Listen to your hunger and fullness signals, stopping when you are full.  Consume protein and produce in your day, striving for a rainbow of color of produce.  Reduce portions to starting size of 1 cup and check in with your gut to see if you are full. Use a sand timer to slow down your eating pace to allow for 15-20 minutes to complete a meal and use the \"2 bite rule\".  Reduce refined sugars and high fat foods, as they may contribute to greater side effects of nausea and heartburn.  Stop eating 3 hours before bedtime to allow your food to digest.  Remain hydrated with water or non caloric and non caffeine beverages.  Use over the  counter kavita lozenge/supplement to help reduce nausea if needed.  If you have been off your medication for more than 2 weeks please notify our office to determine next dosing, as a return to previous dose may not be appropriate or tolerated.      Please try to work on the following dietary changes this first month:    1.  Drink water with meals and throughout the day, cut down on soda and/or juice if consumed. Consider flavored water options like Bubbly, Spindrift, Hint and Zulma. Reduce alcohol servings to 4 per week maximum.  2.  Have protein with each meal, examples include: greek yogurt, cottage cheese, hard boiled egg, tofu, chicken, fish, or tuna.   3.  Work towards reducing/eliminating refined carbohydrates and sugars which includes items such as sweets, as well as rice, pasta, and bread and make sure to choose whole grain options when having them with just 1 serving per meal about the size of your inner palm.  4.  Consume non starchy veggies daily working towards making them a good 50% of your daily food intake. Add them to lunch and dinner consistently.  5.  Start a daily probiotic: VSL#3 is recommended, (order on line at www.vsl3.com). Take 1 capsule daily with water for 30 days, then reduce to 1 every other day (this will reduce the cost). Capsules can be left out of refrigerator for 2 weeks. I recommend using a pill box weekly and keeping the bottle in the fridge.    Please download jun My Fitness Pal, LoseIt! Or My Net Diary to monitor daily dietary intake and you will be able to see if you are eating the right amount of calories or too much or too little which would hinder weight loss. Additionally this will help to see your daily carbohydrate and protein intake. When you set the jun up choose 1.5 lbs/week as a goal.  Keeping a paper food journal is an option as well to remain accountable for your choices- this is the start to mindful eating! A low calorie diet has been consistently shown to  support weight loss.    Continue or start exercising to help establish a routine. If not already exercising begin with 1 day/week and progress as able with the goal of working towards 30 minutes 5 days a week at a minimum. A variety or cardio, strength and stretching is important. Review resources below to help support you in building this healthy routine.    Meditation daily can help manage and control stress. Chronic stress can make weight loss difficult.  Exercising is one way to help with stress, but meditation using the CALM Villa or another comparable alternative can be done in your home or place of work with little time commitment. This Villa can also help work on behavior change and improve sleep. Check out the segment under Calm Masterclass and listen to The 4 Pillars of Health. A great way to begin learning about the foundation of lifestyle with practical tips to use in your every day. In addition, we offer counseling services and support for individual connection and care. A referral is necessary so please let me know if this is a service you are interested.    Check out www.yourProfitPointtters.org blog for continued support and education along your weight loss journey to optimal health!      Patient Resources:    Personal Training/Fitness Classes/Health Coaching    [facility] in [place]: Full fitness center with group fitness and personal training located in [place].  Health Coaching with Nia Santos, Addi Wallace, and Luis Eduardo Lemus at our [facility]- individual coaching to work on your health goals. Call [phone] and/or email @ [email or web address]. Free 60 minute consult when client of [facility].  DERICK Jessica @ [email or web address]. A variety of group fitness options plus various yoga classes [phone] and/or email Alejandra at [email or web address]  [facility] Fitness Centers with multiple locations: Crowdery  (www.LightUp.Celltex Therapeutics), F45 Training (www.w26hcacveup.Celltex Therapeutics), Fit Body Bootcamp (www.fitbodybootcamp.Celltex Therapeutics), BeDo (www.opentabs.Celltex Therapeutics), The Exercise  (www.exercisecoach.com), Club Pilates (www.clubCInergy International UK.Celltex Therapeutics)    Online Fitness  Fitness  on Utube  Fit in 10 DVD series   www.jzrhd13ZVFFitfully  Chair exercises via Sit and Be Fit (www.sitandbefit.org) and Latest Medical (www.ECI Telecom.com) or Hernando Chen or Darshan Hi videos on YouTube.  Hip Hop Fit with Interactivo at www.hiphopfit.Stabiliz Orthopaedics    Apps for on the Go Fitness  Quinby 7 Minute Workout (orange box with white 7) - free on the go HIIT training villa  Peloton Villa @ www.onepeloton.com    Nutrition Trackers, Meal Preparation, and Other Meal Programs  LoseIT! And My Fitness Pal apps and on line for tracking nutrition  NOOM - virtual health coaching  FitFoundation (healthy meals on the go) in [place] @ www.nrreiywtqswed4hFitfully  Bistro MD @ www.bistromd.Celltex [employer]rapeutics and Iopzge73 (calorie smart and low carb plans recommended) @ www.aecovr28.com, Metabolic Meals @ www.MyMetabolicMeals.com - individual prepared meals to go  Gobble, Blue Apron, Home , Every Plate, Sunbasket- on line meal delivery programs for preparation at home  Meal [facility] in [place] for homemade meals to go @ www.mealvillage.com  Diet Doctor @ www.dietdoctor.com - low carb swaps  ReciMe and Mealime villa (grocery and meal planning)    Stress, Anxiety, Depression, Trauma  CALM meditation villa (www.calm.com)  Headspace  Don't let anxiety run your life. Using the science of emotion regulation and mindfulness to overcome fear and worry by nAdrzej Grossman PsyD and Sherif Howe MA.  The [employer] Podcast (September 27, 2023): 6 Magic Words That Stop Anxiety  What Happened to You?- a look at the impact trauma has on behavior written by Nicholas Babin and Dr. Humberto Trejo  Whole Again by Shilo Almaguer - discovering your true self after trauma    Mindful Eating/The Hungry  Brain  Am I Hungry? Mindful eating virtual  jun (www.amihungry.com)  The Hungry Brain by Paris Mustafa, PhD  Mindless Eating by Myles Colon  Weight Loss Surgery Will Not Treat Food Addiction by Dorota Boucher Ph.D    Metabolic Dysfunction, Hormones and Cravings  Why We Get Sick? By Bill Michael (insulin resistance)  Your Body in Balance: The New Science of Food, Hormones, and Health by Dr. Jose Junior  The Complete Guide to fasting by Dr. Swan  Fast Like a Girl by Dr. Cony Garcia  The M Factor (documentary on PBS about Menopause)  Sugar, Salt & Fat by Linda Nina, Ph.D, R.D.  The Truth About Sugar - documentary on sugar (Free on Yieldbot, https://youBoommy Fashionu.be/8K1xhlmUX9t)  Presentation on SUGAR called Sugar: The Bitter Truth by Dr. Adolfo Pritchett (Yieldbot) [email or web address] Fashionu.[email or web address]-oM?[email or web address]  Reverse Visceral Fat: #1 Way to Increase Your Lifespan & End Inflammation with Dr. Giuliano Martínez on Utube @ https://youBoommy Fashionu.be/nupPRnvUpJY?si=[email or web address]    Nutrition Support  You Are What You Eat - Netfix series on twin study looking at impact of nutrition changes on health  The End of Dieting: How to Live for Life by Dr. Dayday Lamb M.D. or listen to The Knowledge Delivery Systems Podcast Episode 63: Understanding \"Nutritarian\" Eating w/Dr. Dayday Lamb  The Game Changers- Netflix Documentary on plant based nutrition  The Dr. Morris T5 Wellness Plan by Dr. Ricky Morris MD  The Complete Guide to fasting by Dr. Swan  @[facility] ([facility] Dietician with support surrounding nutrition and meal prep/planning)    Education, Motivation and Support Resources  Live to 100: Secrets of the Blue Zones - Netflix series on the secrets to communities living over 100 years old  Atomic Habits by Santana Sargent (a book about taking small steps to promote greater behavior change)   Motivation jun (black box with white \")- daily supportive messages sent to your phone  Can't Hurt Me by Andrzej Isbell (a book exploring the power of  discipline in achieving your goals)  Fed Up - documentary about obesity (Free on Utube)  Www.yourweightmatters.org - Obesity Action Coalition sponsored Blog posts  Obesity Action Coalition Resources on topics specific to weight management (www.obesityaction.org)  Fitlosophy Fitspiration - journal to better health (journal book found at [employer] in fitness aisle)  Dean Anton talk titled: The Call to Courage (Netflix)  The Exam Room by the Physician's Committee (Podcast)  Nutrition Facts by Dr. Ahn (Podcast)      Balanced Nutrition includes:     Build the mentality of Food 4 Fuel. Clean eating with whole foods and eliminating/reducing ultra processed foods.  Be an intuitive eater and using mindful eating practices.  Eat a balanced plate with protein and produce at all meals: 1/4 plate- protein, 1/2 plate non starchy veggies, and 1/4 plate fruit or complex carbohydrate.  Drink water with all meals and use a salad plate to naturally reduce portions.  Eliminate/reduce late night eating by stopping after 7pm. Allowing your body to fast for 12 hours (drink only water, tea or black coffee without any additives).              Mindful eating takes practice to build a life long habit. Often we want to eat but not for true hunger, rather it's triggered by emotional/physical or environmental reasons. Check out www.zhiwo website for tools and tips as well as an jun for daily support. Beyond these tools counseling can be an option to help support behavior change.    Get In Touch With Your Appetite- Hunger Cues  There are many signals that tell us it's time to eat (other than a rumbling stomach): television ads, social events, smells from the food court, and the candy bowl at the office. These factors in the environment trigger our senses and other mental processes that make us think we are hungry even when we’re not.  **The Hunger Rating Scale can help you decide if you are experiencing real hunger.  Remember that  physical hunger builds gradually over time (usually over several hours after a meal), whereas emotional eating and cravings usually come on very suddenly. When you are genuinely hungry, you may experience one or several of the symptoms listed below:  Stomach pangs or growling   Emptiness in the stomach   Irritability   Headache   Low energy/fatigue   Difficulty concentrating  How Does the Scale Work?  Before you eat, take a moment to rate your hunger. Think about how hungry you physically feel. Your goal is to eat between levels 4 and 6. This means you are eating when you are hungry but stopping when you are comfortably full.  Try not to put off eating for too long. Waiting until level 1 or 2 -- when you are starving and unable to concentrate -- may lead to overeating. When you first start to feel any of the symptoms listed above, you should probably start to think about eating.  We often let the sight of food tempt us when we are above a level 6 on the scale. Before you indulge, take a step back and think about how you feel. Did you just eat a few minutes ago? Are you eating in response to an emotion or because you are experiencing physical hunger?  Think of alternatives to eating for when these temptations arise. Some ideas are:  Drink a glass of cold water or another zero-calorie beverage   Take a walk to change the scenery   Do another form of exercise (sit-ups, running, swimming, tennis, etc.)   Call/text a friend or family member   Play a game with someone else  **   Hunger-Satiety Rating Scale    Full - 10        10 = Stuffed to the point of feeling sick   9 = Very uncomfortably full, need to loosen your belt    8 = Uncomfortably full, feel stuffed    7 = Very full, feel as if you have overeaten    6 = Comfortably full, satisfied    Neutral - 5    5 = Comfortable, neither hungry nor full   4 = Beginning signs and symptoms of hunger    3 = Hungry with several hunger symptoms, ready to eat    2 = Very hungry,  unable to concentrate    Hungry - 1    1 = Starving, dizzy, irritable     ___________________________________________________________________  Taken from the [employer] @ www.diabetes.org.  Last Edited: March 19, 2014, reviewed December 17, 2013    Emotional Eating and Overeating   You have to know how to stop emotional eating and stop overeating if you want to lose weight and keep it off successfully.  Emotional eating and overeating can’t really satisfy an insatiable appetite anyway.  And whether or not you’ve been trying to use emotional eating to soothe feelings of stress, depression, loneliness, frustration or boredom, in the long run, overeating to feed those feelings only makes them worse.  But learning how to stop overeating and control emotional eating can support healthy permanent weight loss and make you feel powerful.  Stop Emotional Eating - Don’t Use Foods to Soothe Moods  You probably already know that overeating high-fat, high-calorie, sweet, salty and unhealthy bad carbs won’t fill that empty void inside for long.  But what can you do to learn how to stop emotional overeating?  Most of us learn emotional eating at a very young age. We get into the habit of using food to sooth stressful feelings, alleviate boredom, reward and comfort ourselves, boost our sprits and celebrate with others.  But even though almost everyone’s overeating, you don’t have to. If you’re ready to take that old-frenzied feed-your-feelings bull by the estrellita, here’s our basic 12 step program for how to stop emotional eating.  1. Make a commitment. Like any established bad habit, nothing will change unless you make a commit to changing your behavior.  2. Practice awareness. To be more conscious of what’s happening, jot down when and what you eat and how you feel before and afterwards.  3. Manage your stress. Healthy emotional distress management is an important life skill. Positive ways to reduce stress include  regular exercise, relaxation techniques and getting support from family and friends.  4. Be physically active. Exercise reduces stress and is a great mood enhancer too. So be sure you make time for regular physical activity.  5. Create new comforts. Make a list of healthy activities you enjoy. And, whenever you feel the need, treat yourself to something on your list.  6. Start eating healthier. When you eat for health you’ll choose more high fiber foods, such as vegetables, beans, whole grains and fresh fruits, plus healthy high protein foods, like fish, lean poultry and low-fat dairy.  7. Eat mini-meals often. By eating 5 or 6 small healthy meals a day, including breakfast, you help keep your blood sugar and moods stable.  8. Get rid of temptations. Don’t keep unhealthy food in the house, don’t shop for food when hungry or stressed and plan ahead before eating out.  9. Get enough sleep. When you’re tired, it’s easier to give in to emotional eating. Consider taking a nap and be sure to get a good nights sleep.  10. Use healthy distraction. Instead of overeating, take a walk, surf the Internet, pet your cat or dog, listen to music, enjoy a warm bath, read a good book, watch a movie, work in the garden or talk to a friend.  11. Practice mindfulness. Mindful eating means paying attention to the act of eating and observing your thoughts and feelings in the process.  12. Get some support. It’s easier to control emotional eating if you have a support network of friends or family. And if no one you know is supportive, make some new health-oriented friends or join a support group.  Learning how to stop emotional eating and overeating is a life-changing experience. Just make sure to stay on track and enjoy the journey.    Posted By Blaze Coleman On December 27, 2015 @ 11:33 am In Healthy Living. Taken from www.[employer]      Obesity is one of the most common health issues these days that is found among all age  groups. It leads to various other diseases and disorders. We often wonder what obesity is and what makes us obese despite our rich lifestyles. Obesity is nothing but an excess of body weight caused due to overeating, overconsumption of calories, or a lack of physical activity. Stress, genetics, toxins and medicinal side effects can also lead to obesity. However, one of the major reasons behind it is Toxic Hunger.  What is Hunger?  Hunger is a feeling we all experience on a daily basis. It is very important to experience the feeling of hunger too. When the glycogen levels run low, our liver sends these signals to our brain so that we eat and replenish the glycogen levels.  However, hunger is of two types - toxic hunger and true hunger.  What is Toxic Hunger?  Our body experiences toxic hunger because we do not get a proper amount of micronutrients in our diet. Further, the food we eat these days consists of processed products and junk food that is excessively high in sugar and salt content. These are nothing but toxins which get accumulated in the intracellular regions of our body. As a result, these toxins start to metabolize in our body and their level in the blood increases, which ultimately reflects the symptoms of toxic hunger such as discomfort, fatigue, headache, weakness, and stress. These symptoms tell our body that it needs more food which makes you feel hungry. Because of this, you tend to mindlessly consume more unhealthy food. The scary part is that this process keeps repeating itself.  The reason for this toxic hunger is that these fat cells are always starving which gives incorrect signals to our brain and we continue eating more and more to feed more calories to our fat cells. This overconsumption of calories results in the problem of obesity.  Toxic hunger gives rise to addiction and withdrawal symptoms too. When we continuously eat an excessive amount of junk and processed food, our body gets  addicted to it. And if we discontinue eating these foods, we tend to become sick. This is known as a withdrawal symptom. A common example of this is the intake of coffee. There are people who are habitual of drinking 3-5 cups of coffee every day. When they don’t get their regular dose of coffee, they start experiencing headaches. These withdrawal symptoms occur because our body is now trying to repair the damage caused by the noxious food.  How do we get rid of Obesity?  It is perfectly okay to feel hungry but it is important to feel the right kind of hunger. True hunger is felt in the stomach and not in the head. Addiction, overeating, withdrawal symptoms, and food cravings can be avoided by consumption of the right type of food.  Today, we live in a world where we have easy access to a variety of processed and junk food. However, one should try to avoid these as much as possible so as to get rid of obesity. Follow these tips to reduce obesity and have a healthier body.  Add a lot of colors to your diet - Eat as many fruits and vegetables as you can. Raw fruits and vegetables have a healthy amount of fiber, and they are also low in sugar, thus keeping us feeling full for a longer period of time with the sustained release of sugar from our food. High intake of fiber helps to reduce the fat in the body..  Whole foods such as whole grains, seeds, nuts, and green leafy vegetables are high in protein and low in calories and fat. Increasing their consumption will eventually help you to cope with the toxic hunger. This will ultimately help to reduce the food cravings as well as treat obesity by reducing the body fat.  Well, friends, it’s high time you should understand the causes of your health issues and take charge of your body. Not only will it help you to treat the disease but it will also guide you to take precautionary measures to prevent it. You cannot entirely avoid eating out and consuming junk food as we are  surrounded by it. However, what you can do is to control how often you consume it and the portion size. You may have it once in a while. But make sure that the amount you consume should be just for pleasure and not for filling up your stomach to the full.  Blog post from www.[employer]      Return in about 4 months (around 9/7/2025) for weight management via clinic or Telemedicine Visit and in clinic in December.    Patient verbalizes understanding.    Debra Wakefield, APRN  5/6/2025    DOCUMENTATION OF TIME SPENT: Code selection for this visit was based on time spent : 55 minutes on date of service in preparing to see the patient, obtaining and/or reviewing separately obtained history, performing a medically appropriate examination, counseling and educating the patient/family/caregiver, ordering medications or testing, referring and communicating with other healthcare providers, documenting clinical information in the electronic medical record, independently interpreting results and communicating results to the patient/family/caregiver and care coordination with the patient's other providers.         [1]   Current Outpatient Medications on File Prior to Visit   Medication Sig Dispense Refill    IRON OR       Phentermine HCl 15 MG Oral Cap Take 1 capsule (15 mg total) by mouth every morning.      ibuprofen 800 MG Oral Tab Take 1 tablet (800 mg total) by mouth every 8 (eight) hours as needed for Pain. 60 tablet 2    Multiple Vitamins-Minerals (MULTI FOR HER) Oral Tab Take by mouth.      Ergocalciferol (VITAMIN D OR) Take by mouth.       No current facility-administered medications on file prior to visit.

## 2025-05-07 ENCOUNTER — OFFICE VISIT (OUTPATIENT)
Dept: INTERNAL MEDICINE CLINIC | Facility: CLINIC | Age: 45
End: 2025-05-07
Payer: COMMERCIAL

## 2025-05-07 VITALS
BODY MASS INDEX: 40.29 KG/M2 | RESPIRATION RATE: 16 BRPM | WEIGHT: 236 LBS | HEART RATE: 90 BPM | OXYGEN SATURATION: 97 % | SYSTOLIC BLOOD PRESSURE: 122 MMHG | DIASTOLIC BLOOD PRESSURE: 84 MMHG | HEIGHT: 64 IN

## 2025-05-07 DIAGNOSIS — G47.33 OSA ON CPAP: ICD-10-CM

## 2025-05-07 DIAGNOSIS — R73.03 PREDIABETES: ICD-10-CM

## 2025-05-07 DIAGNOSIS — E66.813 CLASS 3 SEVERE OBESITY WITH SERIOUS COMORBIDITY AND BODY MASS INDEX (BMI) OF 40.0 TO 44.9 IN ADULT, UNSPECIFIED OBESITY TYPE: ICD-10-CM

## 2025-05-07 DIAGNOSIS — Z51.81 ENCOUNTER FOR THERAPEUTIC DRUG MONITORING: Primary | ICD-10-CM

## 2025-05-07 DIAGNOSIS — E78.5 DYSLIPIDEMIA: ICD-10-CM

## 2025-05-07 DIAGNOSIS — F43.9 STRESS: ICD-10-CM

## 2025-05-07 PROBLEM — E66.9 OBESITY, UNSPECIFIED: Status: ACTIVE | Noted: 2021-08-09

## 2025-05-07 PROCEDURE — 99204 OFFICE O/P NEW MOD 45 MIN: CPT | Performed by: NURSE PRACTITIONER

## 2025-05-07 RX ORDER — TIRZEPATIDE 2.5 MG/.5ML
2.5 INJECTION, SOLUTION SUBCUTANEOUS WEEKLY
Qty: 2 ML | Refills: 0 | Status: SHIPPED | OUTPATIENT
Start: 2025-05-07

## 2025-05-07 RX ORDER — TIRZEPATIDE 5 MG/.5ML
5 INJECTION, SOLUTION SUBCUTANEOUS WEEKLY
Qty: 2 ML | Refills: 3 | Status: SHIPPED | OUTPATIENT
Start: 2025-05-07

## 2025-05-07 RX ORDER — PHENTERMINE HYDROCHLORIDE 15 MG/1
15 CAPSULE ORAL EVERY MORNING
COMMUNITY

## 2025-05-07 NOTE — PATIENT INSTRUCTIONS
Welcome to the [facility]...your Lifestyle Renovation begins now!  Thank you for placing your trust in our health care team, I look forward to working with you along this journey to better health!    Next steps:     1.  Call our office at [phone] to schedule a personal nutrition consultation with one of our registered dieticians, Jatinder Sosa. Bring along your food journal (3 days minimum). See journal options below.  2.  Complete non fasting labs at [facility] lab site prior to next office visit. Labs printed. Lab results will be communicated via TeleFlip.  3.  Body composition completed today with findings of: Total body fat: 45.4% (goal <32%), Visceral Fat: 14 (goal <10), Muscle mass: 12.4% (goal >18%), and waist circumference 42 inches (goal <35 inches).  4.  Use contraception at all times while on anti-obesity medications.  5.  Referral for counseling services placed  6.  Fill your prescribed medication and take as discussed and prescribed: Start Zepbound at 2.5 mg weekly. After 4 weeks increase to the next dose of 5 mg weekly. If at a weight plateau for >3 weeks, then send TeleFlip message with current scale weight to determine if a dose adjustment is appropriate. Otherwise plan to maintain this dose until next visit. Any further dose titrations beyond this dose will be considered at appointments only, unless otherwise discussed. Visit the website [email or web address] and click on Consumers for additional details, savings, and further dosing instructions. This medication may require a prior authorization (PA) by your insurance. A PA may take one week plus to complete and our office will be in touch during this process if needed. If cost/supply prohibitive plan: Wegovy or consider primary diagnosis for treatment for NICK.    Zepbound is being prescribed for the dual FDA indication for the treatment of Overweight/Obesity and Obstructive Sleep Apnea (NICK).    Tips while taking an  injectable medication:    Be an intuitive eater. Listen to your hunger and fullness signals, stopping when you are full.  Consume protein and produce in your day, striving for a rainbow of color of produce.  Reduce portions to starting size of 1 cup and check in with your gut to see if you are full. Use a sand timer to slow down your eating pace to allow for 15-20 minutes to complete a meal and use the \"2 bite rule\".  Reduce refined sugars and high fat foods, as they may contribute to greater side effects of nausea and heartburn.  Stop eating 3 hours before bedtime to allow your food to digest.  Remain hydrated with water or non caloric and non caffeine beverages.  Use over the counter kavita lozenge/supplement to help reduce nausea if needed.  If you have been off your medication for more than 2 weeks please notify our office to determine next dosing, as a return to previous dose may not be appropriate or tolerated.      Please try to work on the following dietary changes this first month:    1.  Drink water with meals and throughout the day, cut down on soda and/or juice if consumed. Consider flavored water options like Bubbly, Spindrift, Hint and Zulma. Reduce alcohol servings to 4 per week maximum.  2.  Have protein with each meal, examples include: greek yogurt, cottage cheese, hard boiled egg, tofu, chicken, fish, or tuna.   3.  Work towards reducing/eliminating refined carbohydrates and sugars which includes items such as sweets, as well as rice, pasta, and bread and make sure to choose whole grain options when having them with just 1 serving per meal about the size of your inner palm.  4.  Consume non starchy veggies daily working towards making them a good 50% of your daily food intake. Add them to lunch and dinner consistently.  5.  Start a daily probiotic: VSL#3 is recommended, (order on line at www.vsl3.com). Take 1 capsule daily with water for 30 days, then reduce to 1 every other day (this will  reduce the cost). Capsules can be left out of refrigerator for 2 weeks. I recommend using a pill box weekly and keeping the bottle in the fridge.    Please download villa My Fitness Pal, LoseIt! Or My Net Diary to monitor daily dietary intake and you will be able to see if you are eating the right amount of calories or too much or too little which would hinder weight loss. Additionally this will help to see your daily carbohydrate and protein intake. When you set the villa up choose 1.5 lbs/week as a goal.  Keeping a paper food journal is an option as well to remain accountable for your choices- this is the start to mindful eating! A low calorie diet has been consistently shown to support weight loss.    Continue or start exercising to help establish a routine. If not already exercising begin with 1 day/week and progress as able with the goal of working towards 30 minutes 5 days a week at a minimum. A variety or cardio, strength and stretching is important. Review resources below to help support you in building this healthy routine.    Meditation daily can help manage and control stress. Chronic stress can make weight loss difficult.  Exercising is one way to help with stress, but meditation using the CALM Villa or another comparable alternative can be done in your home or place of work with little time commitment. This Villa can also help work on behavior change and improve sleep. Check out the segment under Calm Masterclass and listen to The 4 Pillars of Health. A great way to begin learning about the foundation of lifestyle with practical tips to use in your every day. In addition, we offer counseling services and support for individual connection and care. A referral is necessary so please let me know if this is a service you are interested.    Check out www.yourweightmatters.org blog for continued support and education along your weight loss journey to optimal health!      Patient Resources:    Personal  Training/Fitness Classes/Health Coaching    [facility] in [place]: Full fitness center with group fitness and personal training located in [place].  Health Coaching with Nia Santos, Addi Wallace, and Luis Eduardo Lemus at our [facility]- individual coaching to work on your health goals. Call [phone] and/or email @ [email or web address]. Free 60 minute consult when client of [facility].  [facility] @ [email or web address]. A variety of group fitness options plus various yoga classes [phone] and/or email Alejandra at [email or web address]  [facility] Fitness Centers with multiple locations: GTI (www.Foxteq Holdings), F45 Training (www.Birdhouse for Autism), Seiratherm Body Bootcamp (www.Cadence Biomedicalp.EventKloud), burrp! (www.Fronto), The Exercise  (www.exercisecoach.com), Club PilThink Realtime (www.AMCS Group.EventKloud)    Online Fitness  Fitness  on PataFoods  Fit in 10 DVD series   www.bfmam50HIMBumpTop  Chair exercises via Sit and Be Fit (www.sitandThe Daily Muse.org) and [employer] (www.Xeneta.com) or Hernando Chen or Darshan Hi videos on YouTube.  Hip Hop Fit with Timothy Barkley at www.hiphopfit.Revivio    Apps for on the Go Fitness  [place] 7 Minute Workout (orange box with white 7) - free on the go HIIT training villa  Peloton Villa @ www.onepeloton.com    Nutrition Trackers, Meal Preparation, and Other Meal Programs  LoseIT! And My Fitness Pal apps and on line for tracking nutrition  NOOM - virtual health coaching  FitFoundation (healthy meals on the go) in [place] @ www.uqurjjzycqnrm2lBumpTop  Aleah DONNELLY @ Aktana.bistrGlobal Capacity ([employer])d.EventKloud and Pfllsx35 (calorie smart and low carb plans recommended) @ www.tcelgp03.com, Metabolic Meals @ www.Arterial Remodeling TechnologiesMetabolicMeals.com - individual prepared meals to go  Gobble, Blue Apron, Home , Every Plate, Sunbasket- on line meal delivery programs for preparation at home  Meal Ree in [place] for  homemade meals to go @ www.mealvillage.com  Diet Doctor @ www.dietdoctor.com - low carb swaps  ReciMe and Mealime jun (grocery and meal planning)    Stress, Anxiety, Depression, Trauma  CALM meditation jun (www.calm.com)  Headspace  Don't let anxiety run your life. Using the science of emotion regulation and mindfulness to overcome fear and worry by Andrzej Grossman PsyD and Sherif Howe MA.  The [employer] Podcast (September 27, 2023): 6 Magic Words That Stop Anxiety  What Happened to You?- a look at the impact trauma has on behavior written by Nicholas Babin and Dr. Humberto Trejo  Whole Again by Shilo Almaguer - discovering your true self after trauma    Mindful Eating/The Hungry Brain  Am I Hungry? Mindful eating virtual  jun (www.amihungry.com)  The Hungry Brain by Paris Mustafa, PhD  Mindless Eating by Myles Colon  Weight Loss Surgery Will Not Treat Food Addiction by Dorota Boucher Ph.D    Metabolic Dysfunction, Hormones and Cravings  Why We Get Sick? By Bill Michael (insulin resistance)  Your Body in Balance: The New Science of Food, Hormones, and Health by Dr. Jose Junior  The Complete Guide to fasting by Dr. Swan  Fast Like a Girl by Dr. Cony Garcia  The M Factor (documentary on PBS about Menopause)  Sugar, Salt & Fat by Linda Nina, Ph.D, R.D.  The Truth About Sugar - documentary on sugar (Free on Alex and Ani, https://youtu.be/3U7sqzaQN4i)  Presentation on SUGAR called Sugar: The Bitter Truth by Dr. Adolfo Pritchett (Alex and Ani) https://youtu.be/[email or web address]-oM?[email or web address]  Reverse Visceral Fat: #1 Way to Increase Your Lifespan & End Inflammation with Dr. Giuliano Martínez on Utube @ https://youtu.be/nupPRnvUpJY?si=[email or web address]    Nutrition Support  You Are What You Eat - Netfix series on twin study looking at impact of nutrition changes on health  The End of Dieting: How to Live for Life by Dr. Dayday Lamb M.D. or listen to The BioTrace Medical Podcast Episode 63: Understanding \"Nutritarian\"  Eating w/Dr. Dayday Lamb  The Game Changers- Netflix Documentary on plant based nutrition  The Dr. Morris T5 Wellness Plan by Dr. Ricky Morris MD  The Complete Guide to fasting by Dr. Swan  @[facility] ([facility] Dietician with support surrounding nutrition and meal prep/planning)    Education, Motivation and Support Resources  Live to 100: Secrets of the Blue Zones - Netflix series on the secrets to communities living over 100 years old  Atomic Habits by Santana Sargent (a book about taking small steps to promote greater behavior change)   Motivation jun (black box with white \")- daily supportive messages sent to your phone  Can't Hurt Me by Andrzej Isbell (a book exploring the power of discipline in achieving your goals)  Fed Up - documentary about obesity (Free on Utube)  Www.yourweightmatters.org - Obesity Action Coalition sponsored Blog posts  Obesity Action Coalition Resources on topics specific to weight management (www.obesityaction.org)  Fitlosophy Fitspiration - journal to better health (journal book found at [employer] in fitness aisle)  Dean Anton talk titled: The Call to Courage (Netflix)  The Exam Room by the Physician's Committee (Podcast)  Nutrition Facts by Dr. Ahn (Podcast)      Balanced Nutrition includes:     Build the mentality of Food 4 Fuel. Clean eating with whole foods and eliminating/reducing ultra processed foods.  Be an intuitive eater and using mindful eating practices.  Eat a balanced plate with protein and produce at all meals: 1/4 plate- protein, 1/2 plate non starchy veggies, and 1/4 plate fruit or complex carbohydrate.  Drink water with all meals and use a salad plate to naturally reduce portions.  Eliminate/reduce late night eating by stopping after 7pm. Allowing your body to fast for 12 hours (drink only water, tea or black coffee without any additives).              Mindful eating takes practice to build a life long habit. Often we want to eat but not for true hunger, rather it's  triggered by emotional/physical or environmental reasons. Check out www.Zurex Pharma.Medley Health website for tools and tips as well as an jun for daily support. Beyond these tools counseling can be an option to help support behavior change.    Get In Touch With Your Appetite- Hunger Cues  There are many signals that tell us it's time to eat (other than a rumbling stomach): television ads, social events, smells from the food court, and the candy bowl at the office. These factors in the environment trigger our senses and other mental processes that make us think we are hungry even when we’re not.  **The Hunger Rating Scale can help you decide if you are experiencing real hunger.  Remember that physical hunger builds gradually over time (usually over several hours after a meal), whereas emotional eating and cravings usually come on very suddenly. When you are genuinely hungry, you may experience one or several of the symptoms listed below:  Stomach pangs or growling   Emptiness in the stomach   Irritability   Headache   Low energy/fatigue   Difficulty concentrating  How Does the Scale Work?  Before you eat, take a moment to rate your hunger. Think about how hungry you physically feel. Your goal is to eat between levels 4 and 6. This means you are eating when you are hungry but stopping when you are comfortably full.  Try not to put off eating for too long. Waiting until level 1 or 2 -- when you are starving and unable to concentrate -- may lead to overeating. When you first start to feel any of the symptoms listed above, you should probably start to think about eating.  We often let the sight of food tempt us when we are above a level 6 on the scale. Before you indulge, take a step back and think about how you feel. Did you just eat a few minutes ago? Are you eating in response to an emotion or because you are experiencing physical hunger?  Think of alternatives to eating for when these temptations arise. Some ideas are:  Drink a  glass of cold water or another zero-calorie beverage   Take a walk to change the scenery   Do another form of exercise (sit-ups, running, swimming, tennis, etc.)   Call/text a friend or family member   Play a game with someone else  **   Hunger-Satiety Rating Scale    Full - 10        10 = Stuffed to the point of feeling sick   9 = Very uncomfortably full, need to loosen your belt    8 = Uncomfortably full, feel stuffed    7 = Very full, feel as if you have overeaten    6 = Comfortably full, satisfied    Neutral - 5    5 = Comfortable, neither hungry nor full   4 = Beginning signs and symptoms of hunger    3 = Hungry with several hunger symptoms, ready to eat    2 = Very hungry, unable to concentrate    Hungry - 1    1 = Starving, dizzy, irritable     ___________________________________________________________________  Taken from the American Diabetes Association @ www.diabetes.org.  Last Edited: March 19, 2014, reviewed December 17, 2013    Emotional Eating and Overeating   You have to know how to stop emotional eating and stop overeating if you want to lose weight and keep it off successfully.  Emotional eating and overeating can’t really satisfy an insatiable appetite anyway.  And whether or not you’ve been trying to use emotional eating to soothe feelings of stress, depression, loneliness, frustration or boredom, in the long run, overeating to feed those feelings only makes them worse.  But learning how to stop overeating and control emotional eating can support healthy permanent weight loss and make you feel powerful.  Stop Emotional Eating - Don’t Use Foods to Soothe Moods  You probably already know that overeating high-fat, high-calorie, sweet, salty and unhealthy bad carbs won’t fill that empty void inside for long.  But what can you do to learn how to stop emotional overeating?  Most of us learn emotional eating at a very young age. We get into the habit of using food to sooth stressful feelings, alleviate  boredom, reward and comfort ourselves, boost our sprits and celebrate with others.  But even though almost everyone’s overeating, you don’t have to. If you’re ready to take that old-frenzied feed-your-feelings bull by the horns, here’s our basic 12 step program for how to stop emotional eating.  1. Make a commitment. Like any established bad habit, nothing will change unless you make a commit to changing your behavior.  2. Practice awareness. To be more conscious of what’s happening, jot down when and what you eat and how you feel before and afterwards.  3. Manage your stress. Healthy emotional distress management is an important life skill. Positive ways to reduce stress include regular exercise, relaxation techniques and getting support from family and friends.  4. Be physically active. Exercise reduces stress and is a great mood enhancer too. So be sure you make time for regular physical activity.  5. Create new comforts. Make a list of healthy activities you enjoy. And, whenever you feel the need, treat yourself to something on your list.  6. Start eating healthier. When you eat for health you’ll choose more high fiber foods, such as vegetables, beans, whole grains and fresh fruits, plus healthy high protein foods, like fish, lean poultry and low-fat dairy.  7. Eat mini-meals often. By eating 5 or 6 small healthy meals a day, including breakfast, you help keep your blood sugar and moods stable.  8. Get rid of temptations. Don’t keep unhealthy food in the house, don’t shop for food when hungry or stressed and plan ahead before eating out.  9. Get enough sleep. When you’re tired, it’s easier to give in to emotional eating. Consider taking a nap and be sure to get a good nights sleep.  10. Use healthy distraction. Instead of overeating, take a walk, surf the Internet, pet your cat or dog, listen to music, enjoy a warm bath, read a good book, watch a movie, work in the garden or talk to a friend.  11. Practice  mindfulness. Mindful eating means paying attention to the act of eating and observing your thoughts and feelings in the process.  12. Get some support. It’s easier to control emotional eating if you have a support network of friends or family. And if no one you know is supportive, make some new health-oriented friends or join a support group.  Learning how to stop emotional eating and overeating is a life-changing experience. Just make sure to stay on track and enjoy the journey.    Posted By Blaze Coleman On December 27, 2015 @ 11:33 am In Healthy Living. Taken from www.Culture Kitchen      Obesity is one of the most common health issues these days that is found among all age groups. It leads to various other diseases and disorders. We often wonder what obesity is and what makes us obese despite our rich lifestyles. Obesity is nothing but an excess of body weight caused due to overeating, overconsumption of calories, or a lack of physical activity. Stress, genetics, toxins and medicinal side effects can also lead to obesity. However, one of the major reasons behind it is Toxic Hunger.  What is Hunger?  Hunger is a feeling we all experience on a daily basis. It is very important to experience the feeling of hunger too. When the glycogen levels run low, our liver sends these signals to our brain so that we eat and replenish the glycogen levels.  However, hunger is of two types - toxic hunger and true hunger.  What is Toxic Hunger?  Our body experiences toxic hunger because we do not get a proper amount of micronutrients in our diet. Further, the food we eat these days consists of processed products and junk food that is excessively high in sugar and salt content. These are nothing but toxins which get accumulated in the intracellular regions of our body. As a result, these toxins start to metabolize in our body and their level in the blood increases, which ultimately reflects the symptoms of toxic hunger such as  discomfort, fatigue, headache, weakness, and stress. These symptoms tell our body that it needs more food which makes you feel hungry. Because of this, you tend to mindlessly consume more unhealthy food. The scary part is that this process keeps repeating itself.  The reason for this toxic hunger is that these fat cells are always starving which gives incorrect signals to our brain and we continue eating more and more to feed more calories to our fat cells. This overconsumption of calories results in the problem of obesity.  Toxic hunger gives rise to addiction and withdrawal symptoms too. When we continuously eat an excessive amount of junk and processed food, our body gets addicted to it. And if we discontinue eating these foods, we tend to become sick. This is known as a withdrawal symptom. A common example of this is the intake of coffee. There are people who are habitual of drinking 3-5 cups of coffee every day. When they don’t get their regular dose of coffee, they start experiencing headaches. These withdrawal symptoms occur because our body is now trying to repair the damage caused by the noxious food.  How do we get rid of Obesity?  It is perfectly okay to feel hungry but it is important to feel the right kind of hunger. True hunger is felt in the stomach and not in the head. Addiction, overeating, withdrawal symptoms, and food cravings can be avoided by consumption of the right type of food.  Today, we live in a world where we have easy access to a variety of processed and junk food. However, one should try to avoid these as much as possible so as to get rid of obesity. Follow these tips to reduce obesity and have a healthier body.  Add a lot of colors to your diet - Eat as many fruits and vegetables as you can. Raw fruits and vegetables have a healthy amount of fiber, and they are also low in sugar, thus keeping us feeling full for a longer period of time with the sustained release of sugar from our food.  High intake of fiber helps to reduce the fat in the body..  Whole foods such as whole grains, seeds, nuts, and green leafy vegetables are high in protein and low in calories and fat. Increasing their consumption will eventually help you to cope with the toxic hunger. This will ultimately help to reduce the food cravings as well as treat obesity by reducing the body fat.  Well, friends, it’s high time you should understand the causes of your health issues and take charge of your body. Not only will it help you to treat the disease but it will also guide you to take precautionary measures to prevent it. You cannot entirely avoid eating out and consuming junk food as we are surrounded by it. However, what you can do is to control how often you consume it and the portion size. You may have it once in a while. But make sure that the amount you consume should be just for pleasure and not for filling up your stomach to the full.  Blog post from www.Knewbi.com.com

## 2025-05-14 ENCOUNTER — HOSPITAL ENCOUNTER (OUTPATIENT)
Dept: MAMMOGRAPHY | Facility: HOSPITAL | Age: 45
Discharge: HOME | End: 2025-05-14
Attending: PHYSICIAN ASSISTANT
Payer: COMMERCIAL

## 2025-05-14 DIAGNOSIS — N63.0 BREAST NODULE: ICD-10-CM

## 2025-05-14 DIAGNOSIS — R92.8 ABNORMAL FINDINGS ON DIAGNOSTIC IMAGING OF BREAST: ICD-10-CM

## 2025-05-14 PROCEDURE — 88341 IMHCHEM/IMCYTCHM EA ADD ANTB: CPT | Performed by: PHYSICIAN ASSISTANT

## 2025-05-14 PROCEDURE — 88342 IMHCHEM/IMCYTCHM 1ST ANTB: CPT | Performed by: PHYSICIAN ASSISTANT

## 2025-05-14 PROCEDURE — 77065 DX MAMMO INCL CAD UNI: CPT | Performed by: PHYSICIAN ASSISTANT

## 2025-05-14 PROCEDURE — 88305 TISSUE EXAM BY PATHOLOGIST: CPT | Performed by: PHYSICIAN ASSISTANT

## 2025-05-14 PROCEDURE — 19083 BX BREAST 1ST LESION US IMAG: CPT | Performed by: PHYSICIAN ASSISTANT

## 2025-05-14 NOTE — DISCHARGE INSTRUCTIONS
Discharge Instructions  Stereotactic / Ultrasound / MRI Core Biopsy     Place an ice pack on top of the biopsy site in your bra OR the folds of the Ace wrap for 10-15 minutes every hour until bedtime for your comfort and to decrease bleeding.     Keep your supportive bra OR the Ace wrap in place for 24 hours after your biopsy. This compression decreases bleeding and breast movement for your comfort. Wear a supportive bra for the next couple days for comfort (as well as for sleeping)     No bath or shower during the first 24 hours after biopsy. After this time, you may take a bath or shower. It’s okay if the steri-strips get wet but don’t soak them.   No saunas, hot tubs, or swimming pools until the steri-strips fall off (5-7days).  This prevents infection and allows time for the area to completely close and heal.     DO NOT remove the steri-strips. They will fall off in 5 days to two weeks. If any type of irritation (redness, itching, OR blisters) develops in the area around the steri-strips, remove them gently. Keep the area clean and dry.     It is normal to have mild discomfort and bruising at the biopsy site.      You may take Tylenol as needed for discomfort.     DO NOT participate in strenuous activity (aerobics, heavy lifting, housework, gardening, etc.) 48 hours after your biopsy to prevent bleeding.     You will receive results typically within 2-3 business days. Occasionally, the specimen is sent off-site for a 2nd opinion. You will be notified when this occurs as this will delay your results.     If you have any questions about the procedure or your results, please contact the Breast Care Coordinator Nurse at [phone]. (M-F 7:30-4)    If you are having a MRI Breast Biopsy or an Ultrasound guided biopsy, you will be billed for the biopsy and a unilateral mammogram separately.    A 6-month or one year follow-up Diagnostic Mammogram/Ultrasound will be recommended to document stability of the biopsy  site. It may be scheduled at [facility] or [facility] by calling [phone]. You will need an order for this exam from your referring physician.       5/2024

## 2025-05-16 ENCOUNTER — TELEPHONE (OUTPATIENT)
Dept: FAMILY MEDICINE CLINIC | Facility: CLINIC | Age: 45
End: 2025-05-16

## 2025-05-16 ENCOUNTER — TELEPHONE (OUTPATIENT)
Dept: MAMMOGRAPHY | Facility: HOSPITAL | Age: 45
End: 2025-05-16

## 2025-05-16 ENCOUNTER — PATIENT MESSAGE (OUTPATIENT)
Dept: FAMILY MEDICINE CLINIC | Facility: CLINIC | Age: 45
End: 2025-05-16

## 2025-05-16 NOTE — IMAGING NOTE
1305: Spoke with Alejo Henderson post ultrasound guided left breast biopsy.  Introduced myself as one of the breast nurse coordinators at [facility] and informed Ms. Henderson of the purpose of my call.  Name and date of birth verified by patient.    Alejo Henderson confirmed awareness of breast pathology results as below-MyChart notification.    Pathology results and recommendations discussed as follows:   Final Diagnosis:   A. Left breast, 4 o’clock, mass, ultrasound needle core biopsy:  -Intraductal papilloma with florid usual ductal hyperplasia and columnar cell change measuring 8 mm   See EMR for complete pathology report    Per Dr. Melara- Pathology shows the presence of a papilloma.  This is concordant with the imaging assessment.  Surgical consultation is recommended.     Per ANDREA Bettencourt referring to first available [place] breast surgeon Dr Pelayo or Dr. Darline Henderson instructed to make an appointment with Dr. Pelayo or Dr. Gregorio-office phone number provided.     Alejo Henderson verbalized understanding and agreement to the above.    Reinforced post biopsy care and instruction.  Alejo Henderson denies any issues with biopsy site- bleeding, drainage, redness, tenderness.

## 2025-05-16 NOTE — TELEPHONE ENCOUNTER
See nery as well sent to ROCIO Franklin from breast center calling re: breast pathology results    Asks who to refer to for surgical consult  I advised we recommend Dr Pelayo or Dr Gregorio, first available  She voiced understanding and will coordinate with pt

## 2025-05-16 NOTE — TELEPHONE ENCOUNTER
Gali at [facility]/ pathology calling with report:   ( [phone])      Final Diagnosis:   A. Left breast, 4 o’clock, mass, ultrasound needle core biopsy:  -Intraductal papilloma with florid usual ductal hyperplasia and columnar cell change measuring 8 mm (0.8 cm).    Requesting surgeon choice?

## 2025-05-16 NOTE — TELEPHONE ENCOUNTER
Spoke with ANDREA Ragland from Dr Kumar's office regarding referral for breast surgeon for patient. Office will call back with recommendation.

## 2025-05-16 NOTE — TELEPHONE ENCOUNTER
Plz disregard this.  I already took care of as breast dept called again (see TE notes) to follow up on this  I advised we recommend Dr Pelayo or Dr Gregorio, first available.   They will coordinate with pt.   Nothing further needed on our end

## 2025-06-05 DIAGNOSIS — E66.9 ABDOMINAL OBESITY AND METABOLIC SYNDROME: Primary | ICD-10-CM

## 2025-06-05 DIAGNOSIS — E88.810 ABDOMINAL OBESITY AND METABOLIC SYNDROME: Primary | ICD-10-CM

## 2025-06-05 DIAGNOSIS — E66.813 CLASS 3 SEVERE OBESITY DUE TO EXCESS CALORIES WITHOUT SERIOUS COMORBIDITY WITH BODY MASS INDEX (BMI) OF 40.0 TO 44.9 IN ADULT: ICD-10-CM

## 2025-06-05 RX ORDER — PHENTERMINE HYDROCHLORIDE 15 MG/1
15 CAPSULE ORAL EVERY MORNING
Qty: 30 CAPSULE | Refills: 0 | Status: SHIPPED | OUTPATIENT
Start: 2025-06-05

## 2025-06-05 NOTE — TELEPHONE ENCOUNTER
A refill request was received for:  Requested Prescriptions     Pending Prescriptions Disp Refills    Phentermine HCl 15 MG Oral Cap  0     Sig: Take 1 capsule (15 mg total) by mouth every morning.     Last 3 Weights   05/07/25 1107 236 lb   12/27/24 1034 244 lb   11/20/24 1104 237 lb     Last refill date: 12/2024      Last office visit:1/2025 telemed    Follow up due:  Future Appointments   Date Time Provider Department Center   6/25/2025  3:30 PM Aranza Maloney PsyD [facility]   7/15/2025 10:30 AM Radha Pelayo MD [facility] Surg[facility]Onc   9/23/2025  1:40 PM Debra Wakefield APRN [facility]   12/17/2025  1:00 PM Debra Wakefield APRN [facility]

## 2025-06-29 LAB
VITAMIN B12: 437 PG/ML (ref 200–1100)
VITAMIN D, 25-OH, TOTAL: 36 NG/ML (ref 30–100)

## 2025-07-15 ENCOUNTER — OFFICE VISIT (OUTPATIENT)
Dept: SURGERY | Facility: CLINIC | Age: 45
End: 2025-07-15
Payer: COMMERCIAL

## 2025-07-15 VITALS
SYSTOLIC BLOOD PRESSURE: 151 MMHG | HEIGHT: 64 IN | OXYGEN SATURATION: 97 % | DIASTOLIC BLOOD PRESSURE: 90 MMHG | WEIGHT: 247 LBS | RESPIRATION RATE: 16 BRPM | HEART RATE: 94 BPM | BODY MASS INDEX: 42.17 KG/M2

## 2025-07-15 DIAGNOSIS — D36.9 INTRADUCTAL PAPILLOMA: Primary | ICD-10-CM

## 2025-07-15 DIAGNOSIS — Z80.3 FAMILY HISTORY OF BREAST CANCER: ICD-10-CM

## 2025-07-15 PROCEDURE — 99204 OFFICE O/P NEW MOD 45 MIN: CPT | Performed by: SURGERY

## 2025-07-16 ENCOUNTER — TELEPHONE (OUTPATIENT)
Dept: SURGERY | Facility: CLINIC | Age: 45
End: 2025-07-16

## 2025-07-16 DIAGNOSIS — D36.9 INTRADUCTAL PAPILLOMA: Primary | ICD-10-CM

## 2025-07-17 ENCOUNTER — OFFICE VISIT (OUTPATIENT)
Dept: PODIATRY CLINIC | Facility: CLINIC | Age: 45
End: 2025-07-17
Payer: COMMERCIAL

## 2025-07-17 DIAGNOSIS — M76.822 POSTERIOR TIBIAL TENDINITIS, LEFT: Primary | ICD-10-CM

## 2025-07-17 DIAGNOSIS — M72.2 PLANTAR FASCIITIS: ICD-10-CM

## 2025-07-17 PROCEDURE — 99204 OFFICE O/P NEW MOD 45 MIN: CPT | Performed by: STUDENT IN AN ORGANIZED HEALTH CARE EDUCATION/TRAINING PROGRAM

## 2025-07-17 RX ORDER — METHYLPREDNISOLONE 4 MG/1
TABLET ORAL
Qty: 21 TABLET | Refills: 0 | Status: SHIPPED | OUTPATIENT
Start: 2025-07-17

## 2025-07-17 RX ORDER — TIRZEPATIDE 2.5 MG/.5ML
2 INJECTION, SOLUTION SUBCUTANEOUS WEEKLY
COMMUNITY
Start: 2025-07-07

## 2025-07-17 NOTE — PROGRESS NOTES
[facility] Podiatry  Progress Note      Alejo Henderson is a 44 year old female.   Chief Complaint   Patient presents with    Foot Pain     Left inner foot pain this has been for 3 weeks now, denies any injury- pain is 4/10- pain can be worse when walking.              HPI:     Patient is a pleasant 44-year-old female presents to clinic for evaluation of left medial arch pain that she has been experiencing for the past 3 weeks.  Denies any injuries or trauma.  She does admit to walking barefoot in her house on and off.  Today she rates her pain a 4 out of 10.  Relates that pain is worsened with walking and standing.  Patient does work from home.    Allergies: Patient has no known allergies.    Current Medications[1]   Past Medical History[2]   Past Surgical History[3]   Family History[4]   Social Hx on file[5]        REVIEW OF SYSTEMS:     Denies nause, fever, chills  No calf pain  Denies chest pain or SOB      EXAM:   LMP 04/05/2025 (Approximate)   GENERAL: well developed, well nourished, in no apparent distress  EXTREMITIES:   1. Integument: Normal skin temperature and turgor  2. Vascular: Dorsalis pedis two out of four bilateral and posterior tibial pulses two out of   four bilateral, capillary refill normal.   3. Musculoskeletal: All muscle groups are graded 5 out of 5 in the foot and ankle.  Tenderness along the left posterior tibial tendon insertion as well as left medial calcaneal tubercle   4. Neurological: Normal sharp dull sensation; reflexes normal.             ASSESSMENT AND PLAN:   Diagnoses and all orders for this visit:    Posterior tibial tendinitis, left    Plantar fasciitis    Other orders  -     methylPREDNISolone 4 MG Oral Tablet Therapy Pack; Take per package insert (instructions). Take as directed on the box        Plan:     -Patient examined, chart history reviewed.  -Discussed etiology of patient's condition and various treatment options.  -Discussed importance of proper shoe gear and  orthotics.  Patient to avoid walking barefoot at all times.  -Discussed importance of stretching exercises.  Patient to aim to stretch 3-5 times daily.  -Discussed steroid injection with patient including benefits and risks.  Discussed Medrol Dosepak versus cortisone injection.  Patient wishes to proceed with Medrol Dosepak at this time  -Discussed supportive shoe such as new balance or Hoka's.  I also recommended over-the-counter custom inserts at this time   -Use suportive shoe gear and inserts at all times with ambulation.  Avoid walking barefoot.  - Perform stretching exercises 3-5 times daily.  Instructions dispensed.  - Follow-up in 3 weeks for reevaluation.      The patient indicates understanding of these issues and agrees to the plan.        Sandra Martel DPM        [1]   Current Outpatient Medications   Medication Sig Dispense Refill    MOUNJARO 2.5 MG/0.5ML Subcutaneous Solution Auto-injector Inject 2 mg into the skin once a week.      methylPREDNISolone 4 MG Oral Tablet Therapy Pack Take per package insert (instructions). Take as directed on the box 21 tablet 0    IRON OR       Multiple Vitamins-Minerals (MULTI FOR HER) Oral Tab Take by mouth.      Ergocalciferol (VITAMIN D OR) Take by mouth.      Phentermine HCl 15 MG Oral Cap Take 1 capsule (15 mg total) by mouth every morning. 30 capsule 0    Tirzepatide-Weight Management (ZEPBOUND) 5 MG/0.5ML Subcutaneous Solution Auto-injector Inject 5 mg into the skin once a week. Start after completing full 4 weeks on Zepbound 2.5 mg weekly dose. 2 mL 3    ibuprofen 800 MG Oral Tab Take 1 tablet (800 mg total) by mouth every 8 (eight) hours as needed for Pain. 60 tablet 2   [2]   Past Medical History:   Anemia    adult onset    Anxiety    Back problem    Depression    Family therapy    Genital warts    History of chicken pox        History of pregnancy    SAB,  x2    Hypertension    Pre-eclampsia superimposed on chronic hypertension, postpartum  (HCC)    Pre-existing essential hypertension during pregnancy (HCC)    04-26-22  Dr Griffith ordered baseline labs showing elevated urine protein and creatinine. Plan Nephrology consult- Dr Arellano Labetalol 200 mg BID Baseline labs: [ x ] 24 hr urine protein- 320 mg Pr [ x ] CMP  [  ] Baby aspirin [  ] BP log bid or tid- to fax in weekly  [  ] Serial growth at [facility] [  ] Weekly NST at 32 weeks If ACE-I, thiazide, then [  ]  level 2 u/s and [  ] [facility] consult      Preeclampsia (HCC)    Trichiasis    treated   [3]   Past Surgical History:  Procedure Laterality Date    Colonoscopy N/A 03/14/2017    Procedure: COLONOSCOPY;  Surgeon: Rex Salazar MD;  Location: [facility] ENDOSCOPY    Needle biopsy left Left 03/2021    us guided bx, benign.   [4]   Family History  Problem Relation Age of Onset    Obesity Mother     Psychiatric Mother         Bipolar    Hypertension Mother     Breast Cancer Mother 60    Hypertension Father     Diabetes Father         cause of death    Cancer Maternal Grandfather     Breast Cancer Paternal Grandmother 50        50's    Cancer Paternal Grandmother     Diabetes Paternal Grandfather     Breast Cancer Maternal Aunt 52    Breast Cancer Maternal Aunt 54    Bleeding Disorders Neg     Sickle Cell Neg     DVT/VTE Neg    [5]   Social History  Socioeconomic History    Marital status:    Tobacco Use    Smoking status: Never    Smokeless tobacco: Never   Vaping Use    Vaping status: Never Used   Substance and Sexual Activity    Alcohol use: Not Currently     Comment: occasionally when not pregnant    Drug use: No    Sexual activity: Yes     Partners: Male     Birth control/protection: OCP     Comment: same partner   Other Topics Concern    Caffeine Concern Yes     Comment: 2 cans soda per day    Stress Concern No    Weight Concern Yes    Special Diet No    Exercise No    Seat Belt Yes

## 2025-07-28 ENCOUNTER — TELEPHONE (OUTPATIENT)
Dept: GENERAL RADIOLOGY | Facility: HOSPITAL | Age: 45
End: 2025-07-28

## 2025-07-28 NOTE — TELEPHONE ENCOUNTER
Attempted to contact Alejo Henderson regarding procedure education. General voice message left requesting return call. Contact information provided.

## 2025-07-28 NOTE — TELEPHONE ENCOUNTER
1010: Alejo Henderson returned this nurse's call.  Introduced myself as one of the breast nurse coordinators at [facility] and informed Ms. Henderson of the purpose of my earlier call-procedure education.  Discussed wire localization procedure to be done in the women's imaging center prior to surgery with Dr. Pelayo on Friday, August 29.  Procedure and flow of the day explained. All questions answered to Ms. Henderson satisfaction.  Alejo Henderson verbalized understanding.  Encouraged Ms. Henderson to contact the breast center or Dr. Pelayo's office if she has questions or concerns prior to her surgery date.  Alejo Henderson verbalized agreement and appreciation for the call.

## 2025-07-31 PROBLEM — D36.9 INTRADUCTAL PAPILLOMA: Status: ACTIVE | Noted: 2025-07-31

## 2025-08-03 ENCOUNTER — TELEPHONE (OUTPATIENT)
Dept: FAMILY MEDICINE CLINIC | Facility: CLINIC | Age: 45
End: 2025-08-03

## 2025-08-03 RX ORDER — TIRZEPATIDE 2.5 MG/.5ML
2 INJECTION, SOLUTION SUBCUTANEOUS WEEKLY
Refills: 0 | Status: CANCELLED | OUTPATIENT
Start: 2025-08-03

## 2025-08-06 RX ORDER — TIRZEPATIDE 5 MG/.5ML
5 INJECTION, SOLUTION SUBCUTANEOUS WEEKLY
Qty: 2 ML | Refills: 0 | Status: SHIPPED | OUTPATIENT
Start: 2025-08-06 | End: 2025-08-28

## 2025-08-08 ENCOUNTER — TELEPHONE (OUTPATIENT)
Dept: FAMILY MEDICINE CLINIC | Facility: CLINIC | Age: 45
End: 2025-08-08

## 2025-08-19 ENCOUNTER — APPOINTMENT (OUTPATIENT)
Dept: ADMINISTRATIVE | Facility: HOSPITAL | Age: 45
End: 2025-08-19

## 2025-08-20 ENCOUNTER — TELEPHONE (OUTPATIENT)
Dept: FAMILY MEDICINE CLINIC | Facility: CLINIC | Age: 45
End: 2025-08-20

## 2025-08-20 DIAGNOSIS — Z01.818 PRE-OP TESTING: Primary | ICD-10-CM

## 2025-08-23 LAB
CARBON DIOXIDE: 27 MMOL/L (ref 20–32)
CHLORIDE: 104 MMOL/L (ref 98–110)
POTASSIUM: 3.9 MMOL/L (ref 3.5–5.3)
SODIUM: 138 MMOL/L (ref 135–146)

## 2025-08-27 ENCOUNTER — NURSE ONLY (OUTPATIENT)
Facility: LOCATION | Age: 45
End: 2025-08-27
Attending: SURGERY

## 2025-08-27 ENCOUNTER — OFFICE VISIT (OUTPATIENT)
Facility: LOCATION | Age: 45
End: 2025-08-27
Attending: SURGERY

## 2025-08-27 DIAGNOSIS — Z80.3 FAMILY HISTORY OF BREAST CANCER IN MOTHER: Primary | ICD-10-CM

## 2025-08-28 ENCOUNTER — TELEMEDICINE (OUTPATIENT)
Dept: FAMILY MEDICINE CLINIC | Facility: CLINIC | Age: 45
End: 2025-08-28

## 2025-08-28 DIAGNOSIS — R73.03 PREDIABETES: ICD-10-CM

## 2025-08-28 DIAGNOSIS — E66.9 ABDOMINAL OBESITY AND METABOLIC SYNDROME: ICD-10-CM

## 2025-08-28 DIAGNOSIS — E88.810 ABDOMINAL OBESITY AND METABOLIC SYNDROME: ICD-10-CM

## 2025-08-28 DIAGNOSIS — E66.813 CLASS 3 SEVERE OBESITY DUE TO EXCESS CALORIES WITHOUT SERIOUS COMORBIDITY WITH BODY MASS INDEX (BMI) OF 40.0 TO 44.9 IN ADULT: Primary | ICD-10-CM

## 2025-08-28 DIAGNOSIS — G47.33 OSA ON CPAP: ICD-10-CM

## 2025-08-28 RX ORDER — TIRZEPATIDE 5 MG/.5ML
5 INJECTION, SOLUTION SUBCUTANEOUS WEEKLY
Qty: 2 ML | Refills: 0 | Status: SHIPPED | OUTPATIENT
Start: 2025-08-28

## 2025-08-29 ENCOUNTER — HOSPITAL ENCOUNTER (OUTPATIENT)
Dept: MAMMOGRAPHY | Facility: HOSPITAL | Age: 45
Setting detail: HOSPITAL OUTPATIENT SURGERY
Discharge: HOME | End: 2025-08-29
Attending: SURGERY | Admitting: SURGERY

## 2025-08-29 ENCOUNTER — HOSPITAL ENCOUNTER (OUTPATIENT)
Facility: HOSPITAL | Age: 45
Setting detail: HOSPITAL OUTPATIENT SURGERY
Discharge: HOME | End: 2025-08-29
Attending: SURGERY | Admitting: SURGERY

## 2025-08-29 ENCOUNTER — ANESTHESIA (OUTPATIENT)
Dept: SURGERY | Facility: HOSPITAL | Age: 45
End: 2025-08-29

## 2025-08-29 ENCOUNTER — ANESTHESIA EVENT (OUTPATIENT)
Dept: SURGERY | Facility: HOSPITAL | Age: 45
End: 2025-08-29

## 2025-08-29 VITALS
SYSTOLIC BLOOD PRESSURE: 139 MMHG | BODY MASS INDEX: 41.79 KG/M2 | TEMPERATURE: 98 F | HEART RATE: 90 BPM | DIASTOLIC BLOOD PRESSURE: 91 MMHG | OXYGEN SATURATION: 99 % | RESPIRATION RATE: 16 BRPM | HEIGHT: 64 IN | WEIGHT: 244.81 LBS

## 2025-08-29 DIAGNOSIS — D36.9 INTRADUCTAL PAPILLOMA: ICD-10-CM

## 2025-08-29 LAB — B-HCG UR QL: NEGATIVE

## 2025-08-29 PROCEDURE — 81025 URINE PREGNANCY TEST: CPT

## 2025-08-29 PROCEDURE — 76098 X-RAY EXAM SURGICAL SPECIMEN: CPT | Performed by: SURGERY

## 2025-08-29 PROCEDURE — 19281 PERQ DEVICE BREAST 1ST IMAG: CPT | Performed by: SURGERY

## 2025-08-29 RX ORDER — ACETAMINOPHEN 500 MG
1000 TABLET ORAL ONCE AS NEEDED
Status: COMPLETED | OUTPATIENT
Start: 2025-08-29 | End: 2025-08-29

## 2025-08-29 RX ORDER — HEPARIN SODIUM 5000 [USP'U]/ML
5000 INJECTION, SOLUTION INTRAVENOUS; SUBCUTANEOUS ONCE
Status: COMPLETED | OUTPATIENT
Start: 2025-08-29 | End: 2025-08-29

## 2025-08-29 RX ORDER — HYDROCODONE BITARTRATE AND ACETAMINOPHEN 5; 325 MG/1; MG/1
1-2 TABLET ORAL EVERY 6 HOURS PRN
Qty: 20 TABLET | Refills: 0 | Status: SHIPPED | OUTPATIENT
Start: 2025-08-29

## 2025-08-29 RX ORDER — SODIUM CHLORIDE, SODIUM LACTATE, POTASSIUM CHLORIDE, CALCIUM CHLORIDE 600; 310; 30; 20 MG/100ML; MG/100ML; MG/100ML; MG/100ML
INJECTION, SOLUTION INTRAVENOUS CONTINUOUS
Status: DISCONTINUED | OUTPATIENT
Start: 2025-08-29 | End: 2025-08-29

## 2025-08-29 RX ORDER — MIDAZOLAM HYDROCHLORIDE 1 MG/ML
1 INJECTION INTRAMUSCULAR; INTRAVENOUS EVERY 5 MIN PRN
Status: DISCONTINUED | OUTPATIENT
Start: 2025-08-29 | End: 2025-08-29

## 2025-08-29 RX ORDER — HYDROCODONE BITARTRATE AND ACETAMINOPHEN 5; 325 MG/1; MG/1
1 TABLET ORAL ONCE AS NEEDED
Status: COMPLETED | OUTPATIENT
Start: 2025-08-29 | End: 2025-08-29

## 2025-08-29 RX ORDER — HYDROCODONE BITARTRATE AND ACETAMINOPHEN 5; 325 MG/1; MG/1
2 TABLET ORAL ONCE AS NEEDED
Status: COMPLETED | OUTPATIENT
Start: 2025-08-29 | End: 2025-08-29

## 2025-08-29 RX ORDER — NALOXONE HYDROCHLORIDE 0.4 MG/ML
0.08 INJECTION, SOLUTION INTRAMUSCULAR; INTRAVENOUS; SUBCUTANEOUS AS NEEDED
Status: DISCONTINUED | OUTPATIENT
Start: 2025-08-29 | End: 2025-08-29

## 2025-08-29 RX ORDER — MEPERIDINE HYDROCHLORIDE 25 MG/ML
12.5 INJECTION INTRAMUSCULAR; INTRAVENOUS; SUBCUTANEOUS
Status: DISCONTINUED | OUTPATIENT
Start: 2025-08-29 | End: 2025-08-29

## 2025-08-29 RX ORDER — HYDROMORPHONE HYDROCHLORIDE 1 MG/ML
0.4 INJECTION, SOLUTION INTRAMUSCULAR; INTRAVENOUS; SUBCUTANEOUS EVERY 5 MIN PRN
Status: DISCONTINUED | OUTPATIENT
Start: 2025-08-29 | End: 2025-08-29

## 2025-08-29 RX ORDER — HYDROMORPHONE HYDROCHLORIDE 1 MG/ML
0.2 INJECTION, SOLUTION INTRAMUSCULAR; INTRAVENOUS; SUBCUTANEOUS EVERY 5 MIN PRN
Status: DISCONTINUED | OUTPATIENT
Start: 2025-08-29 | End: 2025-08-29

## 2025-08-29 RX ORDER — SCOPOLAMINE 1 MG/3D
1 PATCH, EXTENDED RELEASE TRANSDERMAL ONCE
Status: DISCONTINUED | OUTPATIENT
Start: 2025-08-29 | End: 2025-08-29

## 2025-08-29 RX ORDER — HYDROMORPHONE HYDROCHLORIDE 1 MG/ML
0.6 INJECTION, SOLUTION INTRAMUSCULAR; INTRAVENOUS; SUBCUTANEOUS EVERY 5 MIN PRN
Status: DISCONTINUED | OUTPATIENT
Start: 2025-08-29 | End: 2025-08-29

## 2025-08-29 RX ORDER — ONDANSETRON 2 MG/ML
4 INJECTION INTRAMUSCULAR; INTRAVENOUS EVERY 6 HOURS PRN
Status: DISCONTINUED | OUTPATIENT
Start: 2025-08-29 | End: 2025-08-29

## 2025-08-29 RX ORDER — DIAZEPAM 5 MG/1
5 TABLET ORAL EVERY 30 MIN PRN
Status: DISCONTINUED | OUTPATIENT
Start: 2025-08-29 | End: 2025-08-29

## 2025-08-29 RX ORDER — ACETAMINOPHEN 500 MG
1000 TABLET ORAL ONCE
Status: DISCONTINUED | OUTPATIENT
Start: 2025-08-29 | End: 2025-08-29

## 2025-08-29 RX ORDER — LIDOCAINE HYDROCHLORIDE 10 MG/ML
INJECTION, SOLUTION EPIDURAL; INFILTRATION; INTRACAUDAL; PERINEURAL AS NEEDED
Status: DISCONTINUED | OUTPATIENT
Start: 2025-08-29 | End: 2025-08-29 | Stop reason: SURG

## 2025-08-29 RX ORDER — LIDOCAINE HYDROCHLORIDE AND EPINEPHRINE 10; 10 MG/ML; UG/ML
INJECTION, SOLUTION INFILTRATION; PERINEURAL AS NEEDED
Status: DISCONTINUED | OUTPATIENT
Start: 2025-08-29 | End: 2025-08-29

## 2025-08-29 RX ORDER — PROCHLORPERAZINE EDISYLATE 5 MG/ML
5 INJECTION INTRAMUSCULAR; INTRAVENOUS EVERY 8 HOURS PRN
Status: DISCONTINUED | OUTPATIENT
Start: 2025-08-29 | End: 2025-08-29

## 2025-08-29 RX ORDER — ONDANSETRON 2 MG/ML
INJECTION INTRAMUSCULAR; INTRAVENOUS AS NEEDED
Status: DISCONTINUED | OUTPATIENT
Start: 2025-08-29 | End: 2025-08-29 | Stop reason: SURG

## 2025-08-29 RX ORDER — BUPIVACAINE HYDROCHLORIDE 5 MG/ML
INJECTION, SOLUTION EPIDURAL; INTRACAUDAL; PERINEURAL AS NEEDED
Status: DISCONTINUED | OUTPATIENT
Start: 2025-08-29 | End: 2025-08-29

## 2025-08-29 RX ADMIN — SODIUM CHLORIDE, SODIUM LACTATE, POTASSIUM CHLORIDE, CALCIUM CHLORIDE: 600; 310; 30; 20 INJECTION, SOLUTION INTRAVENOUS at 10:14:00

## 2025-08-29 RX ADMIN — SODIUM CHLORIDE, SODIUM LACTATE, POTASSIUM CHLORIDE, CALCIUM CHLORIDE: 600; 310; 30; 20 INJECTION, SOLUTION INTRAVENOUS at 10:59:00

## 2025-08-29 RX ADMIN — LIDOCAINE HYDROCHLORIDE 50 MG: 10 INJECTION, SOLUTION EPIDURAL; INFILTRATION; INTRACAUDAL; PERINEURAL at 10:18:00

## 2025-08-29 RX ADMIN — SODIUM CHLORIDE, SODIUM LACTATE, POTASSIUM CHLORIDE, CALCIUM CHLORIDE: 600; 310; 30; 20 INJECTION, SOLUTION INTRAVENOUS at 10:39:00

## 2025-08-29 RX ADMIN — ONDANSETRON 4 MG: 2 INJECTION INTRAMUSCULAR; INTRAVENOUS at 10:38:00

## (undated) DIAGNOSIS — O10.019 PRE-EXISTING ESSENTIAL HYPERTENSION AFFECTING PREGNANCY, ANTEPARTUM: ICD-10-CM

## (undated) DIAGNOSIS — R30.0 BURNING WITH URINATION: Primary | ICD-10-CM

## (undated) DIAGNOSIS — O12.10 PROTEINURIA AFFECTING PREGNANCY, ANTEPARTUM: ICD-10-CM

## (undated) DIAGNOSIS — O09.521 AMA (ADVANCED MATERNAL AGE) MULTIGRAVIDA 35+, FIRST TRIMESTER: Primary | ICD-10-CM

## (undated) DIAGNOSIS — E66.01 CLASS 3 SEVERE OBESITY DUE TO EXCESS CALORIES WITHOUT SERIOUS COMORBIDITY WITH BODY MASS INDEX (BMI) OF 40.0 TO 44.9 IN ADULT (HCC): ICD-10-CM

## (undated) DIAGNOSIS — Z12.31 SCREENING MAMMOGRAM, ENCOUNTER FOR: Primary | ICD-10-CM

## (undated) DEVICE — Device: Brand: DEFENDO AIR/WATER/SUCTION AND BIOPSY VALVE

## (undated) DEVICE — ENDOSCOPY PACK - LOWER: Brand: MEDLINE INDUSTRIES, INC.

## (undated) NOTE — MR AVS SNAPSHOT
[facility]  [address] [place] [address]  [phone]               Thank you for choosing us for your health care visit with Julia Alex MD.  We are glad to serve you and happy to provide you with this summary of your Others include Florastor, Gaby-Q, VSL #3 and Kefir (most expensive)           Allergies as of Jan 16, 2017     No Known Allergies                Today's Vital Signs     BP Pulse Height Weight BMI    116/78 mmHg 93 5' 4.5\" (1.638 m) 214 lb 6.4 oz (97.251

## (undated) NOTE — IP AVS SNAPSHOT
[facility]    [address], [place], [place] ~ [phone]                Discharge Summary   3/14/2017    Araceli Wheeler           Admission Information        Provider Department    3/14/2017 Sarah Pennington, **If unable to reach your doctor, please go to the [facility] AND [facility] Emergency Room**    - Your referring physician will receive a full report of your examination.  - If you do not hear from your doctor's office within two weeks of your biopsy, please call You can access your MyChart to more actively manage your health care and view more details from this visit by going to [email or web address].   If you've recently had a stay at the Hospital you can access your discharge instructions in [address] by jethro

## (undated) NOTE — MR AVS SNAPSHOT
[address] [address]  [phone]               Thank you for choosing us for your health care visit with Sherita Camilo MD.  We are glad to serve you and happy to provide you with this summar location, option 2 for an [facility] AT [facility] location    Assoc Dx:  Non morbid obesity, unspecified obesity type [E66.9], BMI 35.0-35.9,adult [Z68.35]          Reason for Today's Visit     Physical           Medical Issues Discussed Today     Routine physic Make half your plate fruits and vegetables Highly refined, white starches including white bread, rice and pasta   Eat plenty of protein, keep the fat content low Sugars:  sodas and sports drinks, candies and desserts   Eat plenty of low-fat dairy products

## (undated) NOTE — LETTER
[address] [place] [address]  Authorization for Surgical Operation or Procedure  Date: ______________       Time: _______________  1.  I hereby authorize Dr. Tal Olsen , my physician and the assistant, to perform the following operation an operations or procedures to be performed for the purposes of advancing medicine, science, and/or education, provided my identity is not revealed. If the procedure has been videotaped, the physician/surgeon will obtain the original videotape.  The [facility] reasonable alternative to the proposed treatment. I have also explained the risks and benefits involved in the refusal of the proposed treatment and have answered the patient's questions.  If I have a significant financial interest in a co-management agreem

## (undated) NOTE — LETTER
11/15/2017              Jackline Heimlich D [facility]        [address]        [address] [facility] [address]         To Whom It May Concern,    Deena Veliz was seen in our office on 11/14/17.   Jackline Heimlich has been diagnosed with plantar fasciitis and will

## (undated) NOTE — Clinical Note
Thank you for referring Alejo to the [facility]. Consult was completed today via clinic.  I have ordered labs, referred for a nutrition consultation with our dietician, and counseling.  I counseled on the importance of lifestyle intervention in adjunct with medication and started Zepbound for treatment with follow-up advised in about 4 months.